# Patient Record
Sex: FEMALE | Race: WHITE | NOT HISPANIC OR LATINO | Employment: OTHER | URBAN - METROPOLITAN AREA
[De-identification: names, ages, dates, MRNs, and addresses within clinical notes are randomized per-mention and may not be internally consistent; named-entity substitution may affect disease eponyms.]

---

## 2017-05-09 ENCOUNTER — ALLIED HEALTH/NURSE VISIT (OUTPATIENT)
Dept: NURSING | Facility: CLINIC | Age: 72
End: 2017-05-09
Payer: MEDICARE

## 2017-05-09 DIAGNOSIS — H92.09 EAR PAIN: Primary | ICD-10-CM

## 2017-05-09 PROCEDURE — 99207 ZZC NO CHARGE NURSE ONLY: CPT

## 2017-07-26 DIAGNOSIS — Z79.890 NEED FOR PROPHYLACTIC HORMONE REPLACEMENT THERAPY (POSTMENOPAUSAL): ICD-10-CM

## 2017-07-26 RX ORDER — ESTRADIOL 0.5 MG/1
0.5 TABLET ORAL DAILY
Qty: 90 TABLET | Refills: 0 | Status: SHIPPED | OUTPATIENT
Start: 2017-07-26 | End: 2017-09-26

## 2017-07-26 NOTE — TELEPHONE ENCOUNTER
estradiol (ESTRACE) 0.5 MG tablet      Last Written Prescription Date: 10/10/2016  Last Fill Quantity: 90,  # refills: 2   Last Office Visit with [facility], [facility] or [facility] prescribing provider: 0727/2016                                         Next 5 appointments (look out 90 days)     Aug 29, 2017  9:00 AM CDT   Office Visit with Monika Andrews DO   [facility] ([facility])    [address]  [facility] [id number] [phone]

## 2017-09-26 ENCOUNTER — OFFICE VISIT (OUTPATIENT)
Dept: OBGYN | Facility: CLINIC | Age: 72
End: 2017-09-26
Payer: MEDICARE

## 2017-09-26 VITALS
TEMPERATURE: 98 F | DIASTOLIC BLOOD PRESSURE: 78 MMHG | WEIGHT: 149.3 LBS | HEIGHT: 66 IN | BODY MASS INDEX: 23.99 KG/M2 | SYSTOLIC BLOOD PRESSURE: 118 MMHG

## 2017-09-26 DIAGNOSIS — Z79.890 NEED FOR PROPHYLACTIC HORMONE REPLACEMENT THERAPY (POSTMENOPAUSAL): ICD-10-CM

## 2017-09-26 DIAGNOSIS — Z00.00 ROUTINE GENERAL MEDICAL EXAMINATION AT A HEALTH CARE FACILITY: Primary | ICD-10-CM

## 2017-09-26 DIAGNOSIS — Z12.31 ENCOUNTER FOR SCREENING MAMMOGRAM FOR MALIGNANT NEOPLASM OF BREAST: ICD-10-CM

## 2017-09-26 PROCEDURE — 99397 PER PM REEVAL EST PAT 65+ YR: CPT | Performed by: FAMILY MEDICINE

## 2017-09-26 RX ORDER — ESTRADIOL 0.5 MG/1
0.5 TABLET ORAL DAILY
Qty: 90 TABLET | Refills: 3 | Status: SHIPPED | OUTPATIENT
Start: 2017-09-26 | End: 2018-11-04

## 2017-09-26 RX ORDER — ESTRADIOL 0.1 MG/G
2 CREAM VAGINAL PRN
Qty: 70 G | Refills: 11 | Status: SHIPPED | OUTPATIENT
Start: 2017-09-26 | End: 2019-05-28

## 2017-09-26 NOTE — NURSING NOTE
"Chief Complaint   Patient presents with     Gyn Exam       Initial /78  Temp 98  F (36.7  C) (Oral)  Ht 5' 5.5\" (1.664 m)  Wt 149 lb 4.8 oz (67.7 kg)  BMI 24.47 kg/m2 Estimated body mass index is 24.47 kg/(m^2) as calculated from the following:    Height as of this encounter: 5' 5.5\" (1.664 m).    Weight as of this encounter: 149 lb 4.8 oz (67.7 kg).  Medication Reconciliation: complete   Arelis Darling CMA      "

## 2017-09-26 NOTE — PROGRESS NOTES
SUBJECTIVE:  Alix Escobar is an 72 year old  postmenopausal woman   who presents for annual gyn exam. No bleeding, spotting, or discharge noted. Concerns:      Patient reports she is doing well. Denies any vaginal bleeding. Patient would like a refill of estrace; she notes she doesn't use it often, but sometimes has discomfort. Alix also states that a massage therapist told her to ask her doctor about a lump that the therapist felt somewhere on her abdomen. Denies any abdominal pain.     Estrogen replacement therapy: continuous daily oral regimen  MARSHA exposure: no  History of abnormal Pap smear: No  Family history of uterine or ovarian cancer: No  Regular self breast exam: Yes  History of abnormal mammogram: Yes, benign breast biopsy   Family history of breast cancer: No  History of abnormal lipids: Yes: hyperlipidemia     Past Medical History:   Diagnosis Date     Advanced directives, counseling/discussion 2011     Cervicalgia 2011     Dysphagia      Hyperlipidemia LDL goal <130 4/15/2011     Hyperlipidemia LDL goal <160 4/15/2011    High HDL     Osteoarthritides      Osteopenia 2011     Routine general medical examination at a health care facility 2011     Routine gynecological examination 2011     Umbilical hernia           Family History   Problem Relation Age of Onset     CEREBROVASCULAR DISEASE Father      parkinsons     C.A.D. Mother      Unknown/Adopted Maternal Grandmother      Hypertension Maternal Grandfather      Hypertension Paternal Grandfather      Blood Disease Sister      Neurologic Disorder Sister      epilepsy     Arthritis Other      neck and thumb joints       Past Surgical History:   Procedure Laterality Date     BIOPSY      benign breast     COLONOSCOPY  10/1/2013    Procedure: COLONOSCOPY;  Colonoscopy ;  Surgeon: Adair Stoddard MD;  Location: [facility] GI     EXCISE MASS UPPER EXTREMITY  2013    Procedure: EXCISE MASS UPPER EXTREMITY;  Remove Mass  "Right Upper Arm;  Surgeon: Elvira Wilson MD;  Location: [facility] OR     EYE SURGERY      lasik       Current Outpatient Prescriptions   Medication     estradiol (ESTRACE) 0.1 MG/GM cream     estradiol (ESTRACE) 0.5 MG tablet     calcium citrate-vitamin D (CITRACAL) 315-200 MG-UNIT TABS     multivitamin, therapeutic with minerals (MULTI-VITAMIN) TABS     PROGESTERONE     [DISCONTINUED] estradiol (ESTRACE) 0.5 MG tablet     doxycycline (VIBRAMYCIN) 100 MG capsule     No current facility-administered medications for this visit.      Allergies   Allergen Reactions     Seasonal Allergies        Social History   Substance Use Topics     Smoking status: Never Smoker     Smokeless tobacco: Never Used     Alcohol use No       Review Of Systems  Ears/Nose/Throat: negative  Respiratory: No shortness of breath, dyspnea on exertion, cough, or hemoptysis  Cardiovascular: negative  Gastrointestinal: negative  Genitourinary: negative    This document serves as a record of the services and decisions personally performed and made by Monika Andrews DO. It was created on his/her behalf by Ashleigh Alston, a trained medical scribe. The creation of this document is based the provider's statements to the medical scribe.  Scribling Alston 1:48 PM, September 26, 2017    OBJECTIVE:  /78  Temp 98  F (36.7  C) (Oral)  Ht 1.664 m (5' 5.5\")  Wt 67.7 kg (149 lb 4.8 oz)  BMI 24.47 kg/m2  General appearance: healthy, alert and no distress  Skin: Skin color, texture, turgor normal. No rashes or lesions.  Ears: negative  Nose/Sinuses: Nares normal. Septum midline. Mucosa normal. No drainage or sinus tenderness.  Oropharynx: Lips, mucosa, and tongue normal. Teeth and gums normal.  Neck: Neck supple. No adenopathy. Thyroid symmetric, normal size,, Carotids without bruits.  Lungs: negative, Percussion normal. Good diaphragmatic excursion. Lungs clear  Heart: negative, PMI normal. No lifts, heaves, or thrills. RRR. No murmurs, clicks " gallops or rub  Breasts: Inspection negative. No nipple discharge or bleeding. No masses.  Abdomen: Abdomen soft, non-tender. BS normal. No masses, organomegaly  Pelvic: Pelvic:  Pelvic examination without pap/ Gonorrhea and Chlamydia   including  External genitalia normal   and vagina normal rugatted not atrophic  Examination of urethra  normal no masses, tenderness, scarring  bladder, no masses or tenderness   Rectovaginal - deferred    ASSESSMENT:  Alix Escobar is an 72 year old  postmenopausal woman   who presents for annual gyn exam.   Concerns:    PLAN:  Dx:  1)  Patient not due for pap smear, mammogram due  2)  Lipids at appropriate intervals; Colonoscopy ordered  3)  Rx refills of estradiol 0.5 mg tab and 0.1 mg/gm cream   4)  Return to clinic yearly or prn     Rx:  Estradiol 0.5 mg tab po  Estradiol 0.1 mg/gm cream 2 g pv as needed    PE:  Reviewed health maintenance including diet, regular exercise,   estrogen replacement and periodic exams.    The information in this document, created by the medical scribe for me, accurately reflects the services I personally performed and the decisions made by me. I have reviewed and approved this document for accuracy prior to leaving the patient care area.  Monika Andrews DO  2:04 PM, 17    Dr. Monika Andrews, DO    Obstetrics and Gynecology  [facility] [place]

## 2017-09-26 NOTE — MR AVS SNAPSHOT
After Visit Summary   9/26/2017    Alix Escobar    MRN: [id number]           Patient Information     Date Of Birth          1945        Visit Information        Provider Department      9/26/2017 1:30 PM Monika Andrews, DO [facility]        Today's Diagnoses     Routine general medical examination at a health care facility    -  1    Encounter for screening mammogram for malignant neoplasm of breast         Need for prophylactic hormone replacement therapy (postmenopausal)          Care Instructions    Return yearly     Call Lab [phone] to schedule future labs. You [facility]   [facility]  If you are getting cholesterol checked please fast 8 hours     Mammogram schedule     Dr. Monika Andrews, DO    Obstetrics and Gynecology  [facility] [place]               Dr. Monika Andrews, DO    Obstetrics and Gynecology  [facility] [place]                 Follow-ups after your visit        Future tests that were ordered for you today     Open Future Orders        Priority Expected Expires Ordered    *MA Screening Digital Bilateral Routine  9/26/2018 9/26/2017    **CBC with platelets FUTURE anytime Routine 9/26/2017 9/26/2018 9/26/2017    **Comprehensive metabolic panel FUTURE anytime Routine 9/26/2017 9/26/2018 9/26/2017    **Lipid panel reflex to direct LDL FUTURE anytime Routine 9/26/2017 9/26/2018 9/26/2017    **TSH with free T4 reflex FUTURE anytime Routine 9/26/2017 9/26/2018 9/26/2017            Who to contact     If you have questions or need follow up information about today's clinic visit or your schedule please contact [facility] directly at [phone].  Normal or non-critical lab and imaging results will be communicated to you by MyChart, letter or phone within 4 business days after the clinic has received the results. If you do not hear from us within 7 days, please  "contact the clinic through [employer] or phone. If you have a critical or abnormal lab result, we will notify you by phone as soon as possible.  Submit refill requests through [employer] or call your pharmacy and they will forward the refill request to us. Please allow 3 business days for your refill to be completed.          Additional Information About Your Visit        [employer] Information     [employer] gives you secure access to your electronic health record. If you see a primary care provider, you can also send messages to your care team and make appointments. If you have questions, please call your primary care clinic.  If you do not have a primary care provider, please call [phone] and they will assist you.        Care EveryWhere ID     This is your Care EveryWhere ID. This could be used by other organizations to access your [place] medical records  [id number]        Your Vitals Were     Temperature Height BMI (Body Mass Index)             98  F (36.7  C) (Oral) 5' 5.5\" (1.664 m) 24.47 kg/m2          Blood Pressure from Last 3 Encounters:   09/26/17 118/78   07/27/16 128/74   07/20/16 110/70    Weight from Last 3 Encounters:   09/26/17 149 lb 4.8 oz (67.7 kg)   07/27/16 146 lb (66.2 kg)   07/20/16 144 lb 1.6 oz (65.4 kg)                 Where to get your medicines      These medications were sent to [id number] - [place], [place] - [address] [place] [address] [address] [facility] [address], [facility] [address]     Phone:  [phone]     estradiol 0.1 MG/GM cream    estradiol 0.5 MG tablet          Primary Care Provider Office Phone # Fax #    Brent Casas [phone] [address] [facility] [address]        Equal Access to Services     RAY CABAN : Mandy Bautista, snow farfan, qaybta kaaljessica echevarria. So [facility] [phone].    ATENCIÓN: Si habla español, tiene a jordan disposición servicios gratuitos de asistencia " lingüística. Rona al [phone].    We comply with applicable federal civil rights laws and [place] laws. We do not discriminate on the basis of race, color, national origin, age, disability sex, sexual orientation or gender identity.            Thank you!     Thank you for choosing [facility]  for your care. Our goal is always to provide you with excellent care. Hearing back from our patients is one way we can continue to improve our services. Please take a few minutes to complete the written survey that you may receive in the mail after your visit with us. Thank you!             Your Updated Medication List - Protect others around you: Learn how to safely use, store and throw away your medicines at www.disposemymeds.org.          This list is accurate as of: 9/26/17  1:58 PM.  Always use your most recent med list.                   Brand Name Dispense Instructions for use Diagnosis    calcium citrate-vitamin D 315-200 MG-UNIT Tabs per tablet    CITRACAL     Take 1 tablet by mouth daily        doxycycline 100 MG capsule    VIBRAMYCIN    20 capsule    Take 1 capsule (100 mg) by mouth 2 times daily    Tick bite of left popliteal region, initial encounter       estradiol 0.1 MG/GM cream    ESTRACE    70 g    Place 2 g vaginally as needed    Need for prophylactic hormone replacement therapy (postmenopausal)       estradiol 0.5 MG tablet    ESTRACE    90 tablet    Take 1 tablet (0.5 mg) by mouth daily    Need for prophylactic hormone replacement therapy (postmenopausal)       Multi-vitamin Tabs tablet      Take 1 tablet by mouth daily        PROGESTERONE      cream    Routine general medical examination at a health care facility

## 2017-09-26 NOTE — PATIENT INSTRUCTIONS
Return yearly     Call Lab [phone] to schedule future labs. You may schedule   The labs at any [place] facitily.  If you are getting cholesterol checked please fast 8 hours     Mammogram schedule     Dr. Monika Andrews, DO    Obstetrics and Gynecology  [facility]               Dr. Monika Andrews, DO    Obstetrics and Gynecology  [facility]

## 2017-10-09 ENCOUNTER — ALLIED HEALTH/NURSE VISIT (OUTPATIENT)
Dept: NURSING | Facility: CLINIC | Age: 72
End: 2017-10-09
Payer: MEDICARE

## 2017-10-09 DIAGNOSIS — Z23 ENCOUNTER FOR IMMUNIZATION: Primary | ICD-10-CM

## 2017-10-09 DIAGNOSIS — Z23 NEED FOR PROPHYLACTIC VACCINATION AND INOCULATION AGAINST INFLUENZA: ICD-10-CM

## 2017-10-09 DIAGNOSIS — Z00.00 ROUTINE GENERAL MEDICAL EXAMINATION AT A HEALTH CARE FACILITY: ICD-10-CM

## 2017-10-09 LAB
ALBUMIN SERPL-MCNC: 3.7 G/DL (ref 3.4–5)
ALP SERPL-CCNC: 72 U/L (ref 40–150)
ALT SERPL W P-5'-P-CCNC: 28 U/L (ref 0–50)
ANION GAP SERPL CALCULATED.3IONS-SCNC: 5 MMOL/L (ref 3–14)
AST SERPL W P-5'-P-CCNC: 23 U/L (ref 0–45)
BILIRUB SERPL-MCNC: 0.7 MG/DL (ref 0.2–1.3)
BUN SERPL-MCNC: 18 MG/DL (ref 7–30)
CALCIUM SERPL-MCNC: 9.6 MG/DL (ref 8.5–10.1)
CHLORIDE SERPL-SCNC: 105 MMOL/L (ref 94–109)
CHOLEST SERPL-MCNC: 211 MG/DL
CO2 SERPL-SCNC: 30 MMOL/L (ref 20–32)
CREAT SERPL-MCNC: 0.82 MG/DL (ref 0.52–1.04)
ERYTHROCYTE [DISTWIDTH] IN BLOOD BY AUTOMATED COUNT: 13.7 % (ref 10–15)
GFR SERPL CREATININE-BSD FRML MDRD: 68 ML/MIN/1.7M2
GLUCOSE SERPL-MCNC: 89 MG/DL (ref 70–99)
HCT VFR BLD AUTO: 42.3 % (ref 35–47)
HDLC SERPL-MCNC: 90 MG/DL
HGB BLD-MCNC: 14.3 G/DL (ref 11.7–15.7)
LDLC SERPL CALC-MCNC: 108 MG/DL
MCH RBC QN AUTO: 29.8 PG (ref 26.5–33)
MCHC RBC AUTO-ENTMCNC: 33.8 G/DL (ref 31.5–36.5)
MCV RBC AUTO: 88 FL (ref 78–100)
NONHDLC SERPL-MCNC: 121 MG/DL
PLATELET # BLD AUTO: 280 10E9/L (ref 150–450)
POTASSIUM SERPL-SCNC: 4 MMOL/L (ref 3.4–5.3)
PROT SERPL-MCNC: 7.3 G/DL (ref 6.8–8.8)
RBC # BLD AUTO: 4.8 10E12/L (ref 3.8–5.2)
SODIUM SERPL-SCNC: 140 MMOL/L (ref 133–144)
TRIGL SERPL-MCNC: 67 MG/DL
TSH SERPL DL<=0.005 MIU/L-ACNC: 2.89 MU/L (ref 0.4–4)
WBC # BLD AUTO: 6.3 10E9/L (ref 4–11)

## 2017-10-09 PROCEDURE — 85027 COMPLETE CBC AUTOMATED: CPT | Performed by: FAMILY MEDICINE

## 2017-10-09 PROCEDURE — 84443 ASSAY THYROID STIM HORMONE: CPT

## 2017-10-09 PROCEDURE — 90670 PCV13 VACCINE IM: CPT

## 2017-10-09 PROCEDURE — 80061 LIPID PANEL: CPT

## 2017-10-09 PROCEDURE — 80053 COMPREHEN METABOLIC PANEL: CPT

## 2017-10-09 PROCEDURE — G0009 ADMIN PNEUMOCOCCAL VACCINE: HCPCS

## 2017-10-09 PROCEDURE — G0008 ADMIN INFLUENZA VIRUS VAC: HCPCS

## 2017-10-09 PROCEDURE — 90662 IIV NO PRSV INCREASED AG IM: CPT

## 2017-10-09 PROCEDURE — 36415 COLL VENOUS BLD VENIPUNCTURE: CPT

## 2017-10-09 NOTE — PROGRESS NOTES
Injectable Influenza Immunization Documentation    1.  Is the person to be vaccinated sick today?   No    2. Does the person to be vaccinated have an allergy to a component   of the vaccine?   No    3. Has the person to be vaccinated ever had a serious reaction   to influenza vaccine in the past?   No    4. Has the person to be vaccinated ever had Guillain-Barré syndrome?   No    Form completed by LENA Redd

## 2017-10-09 NOTE — MR AVS SNAPSHOT
"              After Visit Summary   10/9/2017    Alix Escobar    MRN: [id number]           Patient Information     Date Of Birth          1945        Visit Information        Provider Department      10/9/2017 8:30 AM MORGAN BARBOUR/LPN [facility]        Today's Diagnoses     Encounter for immunization    -  1    Need for prophylactic vaccination and inoculation against influenza           Follow-ups after your visit        Your next 10 appointments already scheduled     Oct 12, 2017  1:00 PM CDT   MA SCREENING DIGITAL BILATERAL with CRMA1   [facility]    [address] [place] [id number] [phone]           Do not use any powder, lotion or deodorant under your arms or on your breast. If you do, we will ask you to remove it before your exam.  Wear comfortable, two-piece clothing.  If you have any allergies, tell your care team.  Bring any previous mammograms from other facilities or have them mailed to the breast center. Three-dimensional (3D) mammograms are available at [place] locations in [facility], [facility], [facility], and [place]. [facility] locations include [place] [facility] in [place]. Benefits of 3D mammograms include: - Improved rate of cancer detection - Decreases your chance of having to go back for more tests, which means fewer: - \"False-positive\" results (This means that there is an abnormal area but it isn't cancer.) - Invasive testing procedures, such as a biopsy or surgery - Can provide clearer images of the breast if you have dense breast tissue. 3D mammography is an optional exam that anyone can have with a 2D mammogram. It doesn't replace or take the place of a 2D mammogram. 2D mammograms remain an effective screening test for all women.  Not all insurance companies cover the cost of a 3D mammogram. Check with your insurance.            "   Who to contact     If you have questions or need follow up information about today's clinic visit or your schedule please contact [facility] directly at [phone].  Normal or non-critical lab and imaging results will be communicated to you by MyChart, letter or phone within 4 business days after the clinic has received the results. If you do not hear from us within 7 days, please contact the clinic through MyChart or phone. If you have a critical or abnormal lab result, we will notify you by phone as soon as possible.  Submit refill requests through [employer] or call your pharmacy and they will forward the refill request to us. Please allow 3 business days for your refill to be completed.          Additional Information About Your Visit        [employer] Information     [employer] gives you secure access to your electronic health record. If you see a primary care provider, you can also send messages to your care team and make appointments. If you have questions, please call your primary care clinic.  If you do not have a primary care provider, please call [phone] and they will assist you.        Care EveryWhere ID     This is your Care EveryWhere ID. This could be used by other organizations to access your [place] medical records  [id number]         Blood Pressure from Last 3 Encounters:   09/26/17 118/78   07/27/16 128/74   07/20/16 110/70    Weight from Last 3 Encounters:   09/26/17 149 lb 4.8 oz (67.7 kg)   07/27/16 146 lb (66.2 kg)   07/20/16 144 lb 1.6 oz (65.4 kg)              We Performed the Following     ADMIN INFLUENZA (For MEDICARE Patients ONLY) []     ADMIN MEDICARE: Pneumococcal Vaccine ()     FLU VACCINE, INCREASED ANTIGEN, PRESV FREE, AGE 65+ [43485]     PNEUMOCOCCAL CONJ VACCINE 13 VALENT IM        Primary Care Provider Office Phone # Fax #    Brent Casas [phone] [address] [facility] [address]        Equal Access to Services      RAY CABAN : Hadii aad ku luis miguel Bautista, wafroilanda luqadaha, qaybta kaalmada keriaurabrooklynn, jessica siddiquikarensarika olguin. So [facility] [phone].    ATENCIÓN: Si habla español, tiene a jordan disposición servicios gratuitos de asistencia lingüística. Llame al [phone].    We comply with applicable federal civil rights laws and [place] laws. We do not discriminate on the basis of race, color, national origin, age, disability, sex, sexual orientation, or gender identity.            Thank you!     Thank you for choosing [facility]  for your care. Our goal is always to provide you with excellent care. Hearing back from our patients is one way we can continue to improve our services. Please take a few minutes to complete the written survey that you may receive in the mail after your visit with us. Thank you!             Your Updated Medication List - Protect others around you: Learn how to safely use, store and throw away your medicines at www.disposemymeds.org.          This list is accurate as of: 10/9/17  8:51 AM.  Always use your most recent med list.                   Brand Name Dispense Instructions for use Diagnosis    calcium citrate-vitamin D 315-200 MG-UNIT Tabs per tablet    CITRACAL     Take 1 tablet by mouth daily        estradiol 0.1 MG/GM cream    ESTRACE    70 g    Place 2 g vaginally as needed    Need for prophylactic hormone replacement therapy (postmenopausal)       estradiol 0.5 MG tablet    ESTRACE    90 tablet    Take 1 tablet (0.5 mg) by mouth daily    Need for prophylactic hormone replacement therapy (postmenopausal)       Multi-vitamin Tabs tablet      Take 1 tablet by mouth daily        PROGESTERONE      cream    Routine general medical examination at a health care facility

## 2017-10-25 ENCOUNTER — RADIANT APPOINTMENT (OUTPATIENT)
Dept: MAMMOGRAPHY | Facility: CLINIC | Age: 72
End: 2017-10-25
Payer: MEDICARE

## 2017-10-25 DIAGNOSIS — Z12.31 ENCOUNTER FOR SCREENING MAMMOGRAM FOR MALIGNANT NEOPLASM OF BREAST: ICD-10-CM

## 2017-10-25 PROCEDURE — G0202 SCR MAMMO BI INCL CAD: HCPCS | Mod: TC

## 2017-11-06 ENCOUNTER — HOSPITAL ENCOUNTER (OUTPATIENT)
Dept: ULTRASOUND IMAGING | Facility: CLINIC | Age: 72
End: 2017-11-06
Attending: FAMILY MEDICINE
Payer: MEDICARE

## 2017-11-06 ENCOUNTER — HOSPITAL ENCOUNTER (OUTPATIENT)
Dept: MAMMOGRAPHY | Facility: CLINIC | Age: 72
Discharge: HOME | End: 2017-11-06
Attending: FAMILY MEDICINE | Admitting: FAMILY MEDICINE
Payer: MEDICARE

## 2017-11-06 DIAGNOSIS — R92.8 ABNORMAL MAMMOGRAM: ICD-10-CM

## 2017-11-06 PROCEDURE — G0279 TOMOSYNTHESIS, MAMMO: HCPCS

## 2017-11-06 PROCEDURE — 76642 ULTRASOUND BREAST LIMITED: CPT | Mod: RT

## 2017-12-13 ENCOUNTER — OFFICE VISIT (OUTPATIENT)
Dept: FAMILY MEDICINE | Facility: CLINIC | Age: 72
End: 2017-12-13
Payer: MEDICARE

## 2017-12-13 VITALS
SYSTOLIC BLOOD PRESSURE: 128 MMHG | WEIGHT: 147 LBS | DIASTOLIC BLOOD PRESSURE: 82 MMHG | RESPIRATION RATE: 16 BRPM | OXYGEN SATURATION: 100 % | BODY MASS INDEX: 24.09 KG/M2 | HEART RATE: 90 BPM | TEMPERATURE: 97.9 F

## 2017-12-13 DIAGNOSIS — J20.9 ACUTE BRONCHITIS WITH SYMPTOMS > 10 DAYS: Primary | ICD-10-CM

## 2017-12-13 PROCEDURE — 99213 OFFICE O/P EST LOW 20 MIN: CPT | Performed by: FAMILY MEDICINE

## 2017-12-13 RX ORDER — AZITHROMYCIN 250 MG/1
TABLET, FILM COATED ORAL
Qty: 6 TABLET | Refills: 0 | Status: SHIPPED | OUTPATIENT
Start: 2017-12-13 | End: 2018-02-07

## 2017-12-13 NOTE — PROGRESS NOTES
"  SUBJECTIVE:   Alix Escobar is a 72 year old female who presents to clinic today for the following health issues:    She started with cold but it just won't go away.    She has tried OTC cough drops and benadryl and tylenol and ibuprofen.   The worst sx is the cough and congestion.   She is having mucous in the chest.   She is having some shortness of breath.   Her  had same thing a month ago and it finally went away on its own.        Acute Illness   Acute illness concerns: Cough  Onset: X 3 weeks     Fever: no     Chills/Sweats: no     Headache (location?): YES    Sinus Pressure:YES    Conjunctivitis:  no    Ear Pain: no    Rhinorrhea: no     Congestion: YES    Sore Throat: YES-but intermittent     Cough: YES-productive of unknown color of sputum    Wheeze: YES    Decreased Appetite: no     Nausea: no     Vomiting: no     Diarrhea:  \"Little bit\"    Dysuria/Freq.: no     Fatigue/Achiness: YES    Sick/Strep Exposure: YES-  had same symptoms recently     Therapies Tried and outcome: OTC cough drops, Aleve, Tylenol, benadryl, temporarily relieved symptoms         Past Medical History:   Diagnosis Date     Advanced directives, counseling/discussion 6/29/2011     Cervicalgia 8/8/2011     Dysphagia      Hyperlipidemia LDL goal <130 4/15/2011     Hyperlipidemia LDL goal <160 4/15/2011    High HDL     Osteoarthritides      Osteopenia 9/20/2011     Routine general medical examination at a health care facility 9/20/2011     Routine gynecological examination 9/20/2011     Umbilical hernia        Past Surgical History:   Procedure Laterality Date     BIOPSY      benign breast     COLONOSCOPY  10/1/2013    Procedure: COLONOSCOPY;  Colonoscopy ;  Surgeon: Adair Stoddard MD;  Location:  GI     EXCISE MASS UPPER EXTREMITY  5/20/2013    Procedure: EXCISE MASS UPPER EXTREMITY;  Remove Mass Right Upper Arm;  Surgeon: Elvira Wilson MD;  Location:  OR     EYE SURGERY      lasik "       MEDICATIONS:  Current Outpatient Prescriptions   Medication     estradiol (ESTRACE) 0.1 MG/GM cream     estradiol (ESTRACE) 0.5 MG tablet     calcium citrate-vitamin D (CITRACAL) 315-200 MG-UNIT TABS     multivitamin, therapeutic with minerals (MULTI-VITAMIN) TABS     PROGESTERONE     No current facility-administered medications for this visit.        SOCIAL HISTORY:  Social History   Substance Use Topics     Smoking status: Never Smoker     Smokeless tobacco: Never Used     Alcohol use No       Family History   Problem Relation Age of Onset     CEREBROVASCULAR DISEASE Father      parkinsons     C.A.D. Mother      Unknown/Adopted Maternal Grandmother      Hypertension Maternal Grandfather      Hypertension Paternal Grandfather      Blood Disease Sister      Neurologic Disorder Sister      epilepsy     Arthritis Other      neck and thumb joints       Objective:  Blood pressure 128/82, pulse 90, temperature 97.9  F (36.6  C), temperature source Oral, resp. rate 16, weight 147 lb (66.7 kg), SpO2 100 %.  HEENT:  TM's are clear bilaterally.  Oropharynx is clear without tonsillar hypertrophy or exudate.  Conjuctiva are clear.  Neck:  There is no lymphadenopathy or thyroid tenderness or enlargement  Chest: Clear to auscultation bilaterally.  No wheezes, rales or retractions.  CV: Regular rate and rhythm without murmurs, rubs or gallops.  ABDOMEN:  Positive bowel sounds, soft, nondistended and nontender.  No masses are palpated and there is no organomegaly or inguinal lymphadenopathy.    Assessment:  1. Bronchitis with sx for 3 weeks      Plan:  1. azithromcyin  2. The potential side effects of the prescription medication were discussed with the patient.  3. The patient is to follow up as needed for nonresolution of symptoms  4. Due for tdap in spring

## 2017-12-13 NOTE — NURSING NOTE
"Chief Complaint   Patient presents with     Cough     Cough X 3 weeks, cough worsened 2 days ago, productive       Initial /82 (BP Location: Right arm, Patient Position: Chair, Cuff Size: Adult Regular)  Pulse 90  Temp 97.9  F (36.6  C) (Oral)  Resp 16  Wt 147 lb (66.7 kg)  SpO2 100%  BMI 24.09 kg/m2 Estimated body mass index is 24.09 kg/(m^2) as calculated from the following:    Height as of 9/26/17: 5' 5.5\" (1.664 m).    Weight as of this encounter: 147 lb (66.7 kg).  Medication Reconciliation: complete   Alejandro Oseguera MA      "

## 2017-12-13 NOTE — MR AVS SNAPSHOT
After Visit Summary   12/13/2017    Alix Escobar    MRN: [id number]           Patient Information     Date Of Birth          1945        Visit Information        Provider Department      12/13/2017 2:45 PM Natasha Mcintosh MD [facility]        Today's Diagnoses     Acute bronchitis with symptoms > 10 days    -  1       Follow-ups after your visit        Who to contact     If you have questions or need follow up information about today's clinic visit or your schedule please contact [facility] directly at [phone].  Normal or non-critical lab and imaging results will be communicated to you by Bloxyhart, letter or phone within 4 business days after the clinic has received the results. If you do not hear from us within 7 days, please contact the clinic through GeoPayt or phone. If you have a critical or abnormal lab result, we will notify you by phone as soon as possible.  Submit refill requests through [employer] or call your pharmacy and they will forward the refill request to us. Please allow 3 business days for your refill to be completed.          Additional Information About Your Visit        MyChart Information     [employer] gives you secure access to your electronic health record. If you see a primary care provider, you can also send messages to your care team and make appointments. If you have questions, please call your primary care clinic.  If you do not have a primary care provider, please call [phone] and they will assist you.        Care EveryWhere ID     This is your Care EveryWhere ID. This could be used by other organizations to access your [facility] medical records  [id number]        Your Vitals Were     Pulse Temperature Respirations Pulse Oximetry BMI (Body Mass Index)       90 97.9  F (36.6  C) (Oral) 16 100% 24.09 kg/m2        Blood Pressure from Last 3 Encounters:   12/13/17 128/82   09/26/17 118/78   07/27/16 128/74    Weight from  Last 3 Encounters:   12/13/17 147 lb (66.7 kg)   09/26/17 149 lb 4.8 oz (67.7 kg)   07/27/16 146 lb (66.2 kg)              Today, you had the following     No orders found for display         Today's Medication Changes          These changes are accurate as of: 12/13/17  3:25 PM.  If you have any questions, ask your nurse or doctor.               Start taking these medicines.        Dose/Directions    azithromycin 250 MG tablet   Commonly known as:  ZITHROMAX   Used for:  Acute bronchitis with symptoms > 10 days   Started by:  Natasha Mcintosh MD        Two tablets first day, then one tablet daily for four days.   Quantity:  6 tablet   Refills:  0            Where to get your medicines      These medications were sent to [facility] [place] [id number]  [id number] [place] [address]     Phone:  [phone]     azithromycin 250 MG tablet                Primary Care Provider Office Phone # Fax #    Brent Casas -[phone] [address] [facility] [address]        Equal Access to Services     [facility]: Hadii irlanda ku hadasho Soomaali, waaxda luqadaha, qaybta kaalmada adeegyabrooklynn, jessica sauer . So [facility] [phone].    ATENCIÓN: Si habla español, tiene a jordan disposición servicios gratuitos de asistencia lingüística. [facility] [phone].    We comply with applicable federal civil rights laws and [place] laws. We do not discriminate on the basis of race, color, national origin, age, disability, sex, sexual orientation, or gender identity.            Thank you!     Thank you for choosing [facility]  for your care. Our goal is always to provide you with excellent care. Hearing back from our patients is one way we can continue to improve our services. Please take a few minutes to complete the written survey that you may receive in the mail after your visit with us. Thank you!             Your Updated  Medication List - Protect others around you: Learn how to safely use, store and throw away your medicines at www.disposemymeds.org.          This list is accurate as of: 12/13/17  3:25 PM.  Always use your most recent med list.                   Brand Name Dispense Instructions for use Diagnosis    azithromycin 250 MG tablet    ZITHROMAX    6 tablet    Two tablets first day, then one tablet daily for four days.    Acute bronchitis with symptoms > 10 days       calcium citrate-vitamin D 315-200 MG-UNIT Tabs per tablet    CITRACAL     Take 1 tablet by mouth daily        estradiol 0.1 MG/GM cream    ESTRACE    70 g    Place 2 g vaginally as needed    Need for prophylactic hormone replacement therapy (postmenopausal)       estradiol 0.5 MG tablet    ESTRACE    90 tablet    Take 1 tablet (0.5 mg) by mouth daily    Need for prophylactic hormone replacement therapy (postmenopausal)       Multi-vitamin Tabs tablet      Take 1 tablet by mouth daily        PROGESTERONE      cream    Routine general medical examination at a health care facility

## 2017-12-18 ENCOUNTER — TELEPHONE (OUTPATIENT)
Dept: FAMILY MEDICINE | Facility: CLINIC | Age: 72
End: 2017-12-18

## 2018-02-07 ENCOUNTER — OFFICE VISIT (OUTPATIENT)
Dept: FAMILY MEDICINE | Facility: CLINIC | Age: 73
End: 2018-02-07
Payer: MEDICARE

## 2018-02-07 VITALS
OXYGEN SATURATION: 98 % | BODY MASS INDEX: 24.94 KG/M2 | RESPIRATION RATE: 12 BRPM | WEIGHT: 152.2 LBS | SYSTOLIC BLOOD PRESSURE: 122 MMHG | HEART RATE: 69 BPM | DIASTOLIC BLOOD PRESSURE: 88 MMHG | TEMPERATURE: 98 F

## 2018-02-07 DIAGNOSIS — H61.20 IMPACTED CERUMEN, UNSPECIFIED LATERALITY: Primary | ICD-10-CM

## 2018-02-07 PROCEDURE — 99213 OFFICE O/P EST LOW 20 MIN: CPT | Performed by: FAMILY MEDICINE

## 2018-02-07 NOTE — MR AVS SNAPSHOT
After Visit Summary   2/7/2018    Alix Escobar    MRN: [id number]           Patient Information     Date Of Birth          1945        Visit Information        Provider Department      2/7/2018 3:00 PM Beltran Chen MD [facility]        Today's Diagnoses     Impacted cerumen, unspecified laterality    -  1       Follow-ups after your visit        Who to contact     If you have questions or need follow up information about today's clinic visit or your schedule please contact [facility] directly at [phone].  Normal or non-critical lab and imaging results will be communicated to you by Oligomerixhart, letter or phone within 4 business days after the clinic has received the results. If you do not hear from us within 7 days, please contact the clinic through [employer] or phone. If you have a critical or abnormal lab result, we will notify you by phone as soon as possible.  Submit refill requests through [employer] or call your pharmacy and they will forward the refill request to us. Please allow 3 business days for your refill to be completed.          Additional Information About Your Visit        MyChart Information     [employer] gives you secure access to your electronic health record. If you see a primary care provider, you can also send messages to your care team and make appointments. If you have questions, please call your primary care clinic.  If you do not have a primary care provider, please call [phone] and they will assist you.        Care EveryWhere ID     This is your Care EveryWhere ID. This could be used by other organizations to access your [facility] medical records  [id number]        Your Vitals Were     Pulse Temperature Respirations Pulse Oximetry BMI (Body Mass Index)       69 98  F (36.7  C) (Oral) 12 98% 24.94 kg/m2        Blood Pressure from Last 3 Encounters:   02/07/18 122/88   12/13/17 128/82   09/26/17 118/78    Weight from  Last 3 Encounters:   02/07/18 152 lb 3.2 oz (69 kg)   12/13/17 147 lb (66.7 kg)   09/26/17 149 lb 4.8 oz (67.7 kg)              Today, you had the following     No orders found for display       Primary Care Provider Office Phone # Fax #    Brent Casas [phone] [address] [facility] [address]        Equal Access to Services     [facility] : Hadii aad ku hadasho Soomaali, waaxda luqadaha, qaybta kaalmada adeegyada, waxay idiin hayaan adeeg arturkarensarika laviolet . So [facility] [phone].    ATENCIÓN: Si habla español, tiene a jordan disposición servicios gratuitos de asistencia lingüística. [facility] [phone].    We comply with applicable federal civil rights laws and [place] laws. We do not discriminate on the basis of race, color, national origin, age, disability, sex, sexual orientation, or gender identity.            Thank you!     Thank you for choosing [facility]  for your care. Our goal is always to provide you with excellent care. Hearing back from our patients is one way we can continue to improve our services. Please take a few minutes to complete the written survey that you may receive in the mail after your visit with us. Thank you!             Your Updated Medication List - Protect others around you: Learn how to safely use, store and throw away your medicines at www.disposemymeds.org.          This list is accurate as of 2/7/18 11:59 PM.  Always use your most recent med list.                   Brand Name Dispense Instructions for use Diagnosis    calcium citrate-vitamin D 315-200 MG-UNIT Tabs per tablet    CITRACAL     Take 1 tablet by mouth daily        estradiol 0.1 MG/GM cream    ESTRACE    70 g    Place 2 g vaginally as needed    Need for prophylactic hormone replacement therapy (postmenopausal)       estradiol 0.5 MG tablet    ESTRACE    90 tablet    Take 1 tablet (0.5 mg) by mouth daily    Need for prophylactic hormone replacement therapy  (postmenopausal)       Multi-vitamin Tabs tablet      Take 1 tablet by mouth daily        PROGESTERONE      cream    Routine general medical examination at a health care facility

## 2018-02-07 NOTE — PROGRESS NOTES
SUBJECTIVE:   Alix Escobar is a 72 year old female who presents to clinic today for the following health issues:      Patient is here for an ear wash only.  Pain is mild and hearing is  muffled.  Review of systems shows no problems with vision,hearing or learning  No heart murmer, asthma, bowel or bladder problems, rashes, back or muscle problems,dental problems, headaches,balance or frequent infections  URI sympoms are none  Ear exam shows impacted wax with bilateral  redness of canal and normal  drum.      PROBLEM LIST:                   Patient Active Problem List   Diagnosis     Hyperlipidemia LDL goal <160     Advance Care Planning     Cervicalgia     Routine general medical examination at a health care facility     Encounter for routine gynecological examination     Osteopenia     Need for prophylactic hormone replacement therapy (postmenopausal)       PAST MEDICAL HISTORY:                  Past Medical History:   Diagnosis Date     Advanced directives, counseling/discussion 6/29/2011     Cervicalgia 8/8/2011     Dysphagia      Hyperlipidemia LDL goal <130 4/15/2011     Hyperlipidemia LDL goal <160 4/15/2011    High HDL     Osteoarthritides      Osteopenia 9/20/2011     Routine general medical examination at a health care facility 9/20/2011     Routine gynecological examination 9/20/2011     Umbilical hernia        PAST SURGICAL HISTORY:                  Past Surgical History:   Procedure Laterality Date     BIOPSY      benign breast     COLONOSCOPY  10/1/2013    Procedure: COLONOSCOPY;  Colonoscopy ;  Surgeon: Adair Stoddard MD;  Location: [facility]     EXCISE MASS UPPER EXTREMITY  5/20/2013    Procedure: EXCISE MASS UPPER EXTREMITY;  Remove Mass Right Upper Arm;  Surgeon: Elvira Wilson MD;  Location: [facility] OR     EYE SURGERY      lasik       CURRENT MEDICATIONS:                  Current Outpatient Prescriptions   Medication Sig Dispense Refill     estradiol (ESTRACE) 0.1 MG/GM cream Place 2 g  vaginally as needed 70 g 11     estradiol (ESTRACE) 0.5 MG tablet Take 1 tablet (0.5 mg) by mouth daily 90 tablet 3     calcium citrate-vitamin D (CITRACAL) 315-200 MG-UNIT TABS Take 1 tablet by mouth daily       multivitamin, therapeutic with minerals (MULTI-VITAMIN) TABS Take 1 tablet by mouth daily       PROGESTERONE cream               FAMILY HISTORY:                   Wax irrigated clear, prn follow up     (H61.20) Impacted cerumen, unspecified laterality  (primary encounter diagnosis)  Comment:   Plan:

## 2018-04-25 ENCOUNTER — OFFICE VISIT (OUTPATIENT)
Dept: FAMILY MEDICINE | Facility: CLINIC | Age: 73
End: 2018-04-25
Payer: MEDICARE

## 2018-04-25 VITALS
HEIGHT: 66 IN | HEART RATE: 72 BPM | BODY MASS INDEX: 24.11 KG/M2 | TEMPERATURE: 97.8 F | WEIGHT: 150 LBS | SYSTOLIC BLOOD PRESSURE: 120 MMHG | DIASTOLIC BLOOD PRESSURE: 72 MMHG | RESPIRATION RATE: 14 BRPM

## 2018-04-25 DIAGNOSIS — B35.1 ONYCHOMYCOSIS: ICD-10-CM

## 2018-04-25 DIAGNOSIS — K21.9 GASTROESOPHAGEAL REFLUX DISEASE, ESOPHAGITIS PRESENCE NOT SPECIFIED: Primary | ICD-10-CM

## 2018-04-25 PROCEDURE — 99214 OFFICE O/P EST MOD 30 MIN: CPT | Performed by: FAMILY MEDICINE

## 2018-04-25 NOTE — PROGRESS NOTES
SUBJECTIVE:   Alix Escobar is a 72 year old female who presents to clinic today for the following health issues:      Gastrointestinal symptoms      Duration: few weeks    Description:           REFLUX SYMPTOMS - cough and feels as if something is in throat, when clearing throat feels better      Intensity:  mild    Accompanying signs and symptoms:  none    History  Previous {similar problem: YES- 2011  Previous evaluation: Upper GI Endoscopy    Aggravating factors: caffeine and spicy foods    Alleviating factors: avoidance of caffeine    Other Therapies tried: None    Ingrown Toenail      Duration: several weeks    Description (location/character/radiation): Bilateral great toes toe nails are not growing anymore    Intensity:  mild    Accompanying signs and symptoms: swelling,  Unsure what is going on, unsure why the big toe nails are not growing    History (similar episodes/previous evaluation): None    Precipitating or alleviating factors: None    Therapies tried and outcome: None         Problem list and histories reviewed & adjusted, as indicated.  Additional history: as documented    Patient Active Problem List   Diagnosis     Hyperlipidemia LDL goal <160     Advance Care Planning     Cervicalgia     Routine general medical examination at a health care facility     Encounter for routine gynecological examination     Osteopenia     Need for prophylactic hormone replacement therapy (postmenopausal)     Gastroesophageal reflux disease, esophagitis presence not specified     Onychomycosis     Past Surgical History:   Procedure Laterality Date     BIOPSY      benign breast     COLONOSCOPY  10/1/2013    Procedure: COLONOSCOPY;  Colonoscopy ;  Surgeon: Adair Stoddard MD;  Location: [facility]     EXCISE MASS UPPER EXTREMITY  5/20/2013    Procedure: EXCISE MASS UPPER EXTREMITY;  Remove Mass Right Upper Arm;  Surgeon: Elvira Wilson MD;  Location: [facility]     EYE SURGERY      lasik       Social History  "  Substance Use Topics     Smoking status: Never Smoker     Smokeless tobacco: Never Used     Alcohol use No     Family History   Problem Relation Age of Onset     CEREBROVASCULAR DISEASE Father      parkinsons     C.A.D. Mother      Unknown/Adopted Maternal Grandmother      Hypertension Maternal Grandfather      Hypertension Paternal Grandfather      Blood Disease Sister      Neurologic Disorder Sister      epilepsy     Arthritis Other      neck and thumb joints           Reviewed and updated as needed this visit by clinical staff  Tobacco  Allergies  Meds  Med Hx  Surg Hx  Fam Hx  Soc Hx      Reviewed and updated as needed this visit by Provider         ROS:  Constitutional, HEENT, cardiovascular, pulmonary, gi and gu systems are negative, except as otherwise noted.    OBJECTIVE:     /72 (BP Location: Right arm, Patient Position: Chair, Cuff Size: Adult Regular)  Pulse 72  Temp 97.8  F (36.6  C) (Oral)  Resp 14  Ht 5' 5.5\" (1.664 m)  Wt 150 lb (68 kg)  BMI 24.58 kg/m2  Body mass index is 24.58 kg/(m^2).  GENERAL: healthy, alert and no distress  ABDOMEN: soft, nontender, no hepatosplenomegaly, no masses and bowel sounds normal  SKIN: Great toenails are not ingrown.  No erythema or swelling.  Big toes do look a bit yellow and thickened    ASSESSMENT/PLAN:     1. Gastroesophageal reflux disease, esophagitis presence not specified  - Re-start PPI  - omeprazole (PRILOSEC) 20 MG CR capsule; Take 1 capsule (20 mg) by mouth daily  Dispense: 90 capsule; Refill: 1    2. Onychomycosis  - Advised OTC topical antifungal treatment     Follow up if symptoms are worsening or not improving    Viky Gray, DO  [facility]"

## 2018-04-25 NOTE — MR AVS SNAPSHOT
After Visit Summary   4/25/2018    Alix Escobar    MRN: [id number]           Patient Information     Date Of Birth          1945        Visit Information        Provider Department      4/25/2018 10:00 AM Viky Gray DO [facility]        Today's Diagnoses     Gastroesophageal reflux disease, esophagitis presence not specified    -  1    Onychomycosis          Care Instructions      Tips to Control Acid Reflux    To control acid reflux, you ll need to make some basic diet and lifestyle changes. The simple steps outlined below may be all you ll need to ease discomfort.  Watch what you eat    Avoid fatty foods and spicy foods.    Eat fewer acidic foods, such as citrus and tomato-based foods. These can increase symptoms.    Limit drinking alcohol, caffeine, and fizzy beverages. All increase acid reflux.    Try limiting chocolate, peppermint, and spearmint. These can worsen acid reflux in some people.  Watch when you eat    Avoid lying down for 3 hours after eating.    Do not snack before going to bed.  Raise your head  Raising your head and upper body by 4 to 6 inches helps limit reflux when you re lying down. Put blocks under the head of your bed frame to raise it.  Other changes    Lose weight, if you need to    Don t exercise near bedtime    Avoid tight-fitting clothes    Limit aspirin and ibuprofen    Stop smoking   Date Last Reviewed: 7/1/2016 2000-2017 [employer]. [address] [place] [address], [place] [address]. All rights reserved. This information is not intended as a substitute for professional medical care. Always follow your healthcare professional's instructions.        Nail Fungal Infection  A nail fungal infection changes the way fingernails and toenails look. They may thicken, discolor, change shape, or split. This condition is hard to treat because nails grow slowly and have limited blood supply. The infection often comes back after  treatment.  There are 2 types of medicines used to treat this condition:    Topical anti-fungal medicines (Lamisil). These are applied to the surface of the skin and nail area. These medicines are not very effective because they can t get deep into the nail.    Oral antifungal medicines. These medicines work better because they go into the nail from the inside out. But the infection may still come back. It may take 9 to 12 months for your nail to look normal again. This means you are cured. You can repeat treatment if needed. Most people take these medicines without any problems. It is rare to stop therapy because of side effects. But your healthcare provider may give you some monitoring tests. Talk about possible side effects with your provider before starting treatment.  If medicines fail, the nail can be removed surgically or chemically. These methods physically remove the fungus from the body. This helps medical treatment be more effective.  Home care    Use medicines exactly as directed for as long as directed. Treating a fungal infection can take longer than other kinds of infections.    Smoking is a risk factor for fungal infection. This is one more reason to quit.    Wear absorbent socks, and shoes that let your feet breathe. Sweaty feet increase your risk of fungal infection. They also make an existing infection harder to treat.    Use footwear when in damp public places like swimming pools, gyms, and shower rooms. This will help you avoid the fungus that grows there.    Don't share nail clippers or scissors with others.  Follow-up care  Follow up with your healthcare provider, or as advised.  When to seek medical advice  Call your healthcare provider right away if any of these occur:    Skin by the nail becomes red, swollen, painful, or drains pus (a creamy yellow or white liquid)    Side effects from oral anti-fungal medicines  Date Last Reviewed: 8/1/2016 2000-2017 [employer] [address]  "[place] [address]. All rights reserved. This information is not intended as a substitute for professional medical care. Always follow your healthcare professional's instructions.                Follow-ups after your visit        Who to contact     If you have questions or need follow up information about today's clinic visit or your schedule please contact [facility] directly at [phone].  Normal or non-critical lab and imaging results will be communicated to you by [employer], letter or phone within 4 business days after the clinic has received the results. If you do not hear from us within 7 days, please contact the clinic through [employer] or phone. If you have a critical or abnormal lab result, we will notify you by phone as soon as possible.  Submit refill requests through [employer] or call your pharmacy and they will forward the refill request to us. Please allow 3 business days for your refill to be completed.          Additional Information About Your Visit        [employer] Information     [employer] gives you secure access to your electronic health record. If you see a primary care provider, you can also send messages to your care team and make appointments. If you have questions, please call your primary care clinic.  If you do not have a primary care provider, please call [phone] and they will assist you.        Care EveryWhere ID     This is your Care EveryWhere ID. This could be used by other organizations to access your [facility] medical records  [id number]        Your Vitals Were     Pulse Temperature Respirations Height BMI (Body Mass Index)       72 97.8  F (36.6  C) (Oral) 14 5' 5.5\" (1.664 m) 24.58 kg/m2        Blood Pressure from Last 3 Encounters:   04/25/18 120/72   02/07/18 122/88   12/13/17 128/82    Weight from Last 3 Encounters:   04/25/18 150 lb (68 kg)   02/07/18 152 lb 3.2 oz (69 kg)   12/13/17 147 lb (66.7 kg)              Today, you had the " following     No orders found for display         Today's Medication Changes          These changes are accurate as of 4/25/18 10:22 AM.  If you have any questions, ask your nurse or doctor.               Start taking these medicines.        Dose/Directions    omeprazole 20 MG CR capsule   Commonly known as:  priLOSEC   Used for:  Gastroesophageal reflux disease, esophagitis presence not specified   Started by:  Viky Gray,         Dose:  20 mg   Take 1 capsule (20 mg) by mouth daily   Quantity:  90 capsule   Refills:  1            Where to get your medicines      These medications were sent to [facility] [address] [place] [address] - [address] AT [place] [address] [address]  [address], [facility] [place] [address]     Phone:  [phone]     omeprazole 20 MG CR capsule                Primary Care Provider Office Phone # Fax #    Brent Casas [phone] [address] [facility] [address]        Equal Access to Services     ARABELLA CABAN AH: Hadii irlanda ku hadasho Soomaali, waaxda luqadaha, qaybta kaalmada adeegyada, waxay idiin hayaan citlaly sauer . So [facility] [phone].    ATENCIÓN: Si habla español, tiene a jordan disposición servicios gratuitos de asistencia lingüística. Rona al [phone].    We comply with applicable federal civil rights laws and [place] laws. We do not discriminate on the basis of race, color, national origin, age, disability, sex, sexual orientation, or gender identity.            Thank you!     Thank you for choosing [facility]  for your care. Our goal is always to provide you with excellent care. Hearing back from our patients is one way we can continue to improve our services. Please take a few minutes to complete the written survey that you may receive in the mail after your visit with us. Thank you!             Your Updated Medication List - Protect others around you: Learn how to safely use, store  and throw away your medicines at www.disposemymeds.org.          This list is accurate as of 4/25/18 10:22 AM.  Always use your most recent med list.                   Brand Name Dispense Instructions for use Diagnosis    calcium citrate-vitamin D 315-200 MG-UNIT Tabs per tablet    CITRACAL     Take 1 tablet by mouth daily        estradiol 0.1 MG/GM cream    ESTRACE    70 g    Place 2 g vaginally as needed    Need for prophylactic hormone replacement therapy (postmenopausal)       estradiol 0.5 MG tablet    ESTRACE    90 tablet    Take 1 tablet (0.5 mg) by mouth daily    Need for prophylactic hormone replacement therapy (postmenopausal)       Multi-vitamin Tabs tablet      Take 1 tablet by mouth daily        omeprazole 20 MG CR capsule    priLOSEC    90 capsule    Take 1 capsule (20 mg) by mouth daily    Gastroesophageal reflux disease, esophagitis presence not specified       PROGESTERONE      cream    Routine general medical examination at a health care facility

## 2018-04-25 NOTE — PATIENT INSTRUCTIONS
Tips to Control Acid Reflux    To control acid reflux, you ll need to make some basic diet and lifestyle changes. The simple steps outlined below may be all you ll need to ease discomfort.  Watch what you eat    Avoid fatty foods and spicy foods.    Eat fewer acidic foods, such as citrus and tomato-based foods. These can increase symptoms.    Limit drinking alcohol, caffeine, and fizzy beverages. All increase acid reflux.    Try limiting chocolate, peppermint, and spearmint. These can worsen acid reflux in some people.  Watch when you eat    Avoid lying down for 3 hours after eating.    Do not snack before going to bed.  Raise your head  Raising your head and upper body by 4 to 6 inches helps limit reflux when you re lying down. Put blocks under the head of your bed frame to raise it.  Other changes    Lose weight, if you need to    Don t exercise near bedtime    Avoid tight-fitting clothes    Limit aspirin and ibuprofen    Stop smoking   Date Last Reviewed: 7/1/2016 2000-2017 [employer]. [address] [place] [address], [place] [address]. All rights reserved. This information is not intended as a substitute for professional medical care. Always follow your healthcare professional's instructions.        Nail Fungal Infection  A nail fungal infection changes the way fingernails and toenails look. They may thicken, discolor, change shape, or split. This condition is hard to treat because nails grow slowly and have limited blood supply. The infection often comes back after treatment.  There are 2 types of medicines used to treat this condition:    Topical anti-fungal medicines (Lamisil). These are applied to the surface of the skin and nail area. These medicines are not very effective because they can t get deep into the nail.    Oral antifungal medicines. These medicines work better because they go into the nail from the inside out. But the infection may still come back. It may take 9 to 12 months for your  nail to look normal again. This means you are cured. You can repeat treatment if needed. Most people take these medicines without any problems. It is rare to stop therapy because of side effects. But your healthcare provider may give you some monitoring tests. Talk about possible side effects with your provider before starting treatment.  If medicines fail, the nail can be removed surgically or chemically. These methods physically remove the fungus from the body. This helps medical treatment be more effective.  Home care    Use medicines exactly as directed for as long as directed. Treating a fungal infection can take longer than other kinds of infections.    Smoking is a risk factor for fungal infection. This is one more reason to quit.    Wear absorbent socks, and shoes that let your feet breathe. Sweaty feet increase your risk of fungal infection. They also make an existing infection harder to treat.    Use footwear when in damp public places like swimming pools, gyms, and shower rooms. This will help you avoid the fungus that grows there.    Don't share nail clippers or scissors with others.  Follow-up care  Follow up with your healthcare provider, or as advised.  When to seek medical advice  Call your healthcare provider right away if any of these occur:    Skin by the nail becomes red, swollen, painful, or drains pus (a creamy yellow or white liquid)    Side effects from oral anti-fungal medicines  Date Last Reviewed: 8/1/2016 2000-2017 [employer]. [address] [place] [address], [place] [address]. All rights reserved. This information is not intended as a substitute for professional medical care. Always follow your healthcare professional's instructions.

## 2018-07-03 ENCOUNTER — OFFICE VISIT (OUTPATIENT)
Dept: FAMILY MEDICINE | Facility: CLINIC | Age: 73
End: 2018-07-03
Payer: MEDICARE

## 2018-07-03 VITALS
DIASTOLIC BLOOD PRESSURE: 74 MMHG | TEMPERATURE: 97.9 F | WEIGHT: 149.9 LBS | BODY MASS INDEX: 24.57 KG/M2 | OXYGEN SATURATION: 97 % | HEART RATE: 69 BPM | SYSTOLIC BLOOD PRESSURE: 116 MMHG

## 2018-07-03 DIAGNOSIS — Z23 NEED FOR VACCINATION: ICD-10-CM

## 2018-07-03 DIAGNOSIS — R25.1 TREMOR: Primary | ICD-10-CM

## 2018-07-03 PROCEDURE — 90715 TDAP VACCINE 7 YRS/> IM: CPT | Performed by: FAMILY MEDICINE

## 2018-07-03 PROCEDURE — 99213 OFFICE O/P EST LOW 20 MIN: CPT | Mod: 25 | Performed by: FAMILY MEDICINE

## 2018-07-03 PROCEDURE — 90471 IMMUNIZATION ADMIN: CPT | Performed by: FAMILY MEDICINE

## 2018-07-03 NOTE — PROGRESS NOTES
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  For the past 1-2 years she has noticed that her head shakes when she's singing.  It tends to shake right to left as if she is saying \"no\".  She notes that she only gets tremors when she is relaxed.  She hasn't noticed any tremors anywhere else, although her friend has noted some shaking in her hands.  She has noticed the shaking more often recently.  Gets it about twice a week.  She can stop the shaking if she stiffens her muscles and concentrates.  Her father had Parkinson's and her sister has essential tremor.  She denies issues with vision, speech, hearing.  No numbness or tingling.  She has noticed some balance issues lately and is working on that at her gym.      Problem list and histories reviewed & adjusted, as indicated.  Additional history: as documented    Patient Active Problem List   Diagnosis     Hyperlipidemia LDL goal <160     Advance Care Planning     Cervicalgia     Routine general medical examination at a health care facility     Encounter for routine gynecological examination     Osteopenia     Need for prophylactic hormone replacement therapy (postmenopausal)     Gastroesophageal reflux disease, esophagitis presence not specified     Onychomycosis     Past Surgical History:   Procedure Laterality Date     BIOPSY      benign breast     COLONOSCOPY  10/1/2013    Procedure: COLONOSCOPY;  Colonoscopy ;  Surgeon: Adair Stoddard MD;  Location:  GI     EXCISE MASS UPPER EXTREMITY  5/20/2013    Procedure: EXCISE MASS UPPER EXTREMITY;  Remove Mass Right Upper Arm;  Surgeon: Elvira Wilson MD;  Location: [facility] OR     EYE SURGERY      lasik       Social History   Substance Use Topics     Smoking status: Never Smoker     Smokeless tobacco: Never Used     Alcohol use No     Family History   Problem Relation Age " of Onset     Cerebrovascular Disease Father      parkinsons     C.A.D. Mother      Unknown/Adopted Maternal Grandmother      Hypertension Maternal Grandfather      Hypertension Paternal Grandfather      Blood Disease Sister      Neurologic Disorder Sister      epilepsy     Arthritis Other      neck and thumb joints           Reviewed and updated as needed this visit by clinical staff  Tobacco  Allergies  Meds  Med Hx  Surg Hx  Fam Hx  Soc Hx      Reviewed and updated as needed this visit by Provider         ROS:  Constitutional, HEENT, cardiovascular, pulmonary, gi and gu systems are negative, except as otherwise noted.    OBJECTIVE:     /74 (BP Location: Left arm, Patient Position: Chair, Cuff Size: Adult Regular)  Pulse 69  Temp 97.9  F (36.6  C) (Oral)  Wt 149 lb 14.4 oz (68 kg)  SpO2 97%  Breastfeeding? No  BMI 24.57 kg/m2  Body mass index is 24.57 kg/(m^2).  GENERAL: healthy, alert and no distress  EYES: Eyes grossly normal to inspection, PERRL and EOMI, no nystagmus  HENT: ear canals and TM's normal, nose and mouth without ulcers or lesions  NECK: no adenopathy, no asymmetry, masses, or scars and thyroid normal to palpation  RESP: lungs clear to auscultation - no rales, rhonchi or wheezes  CV: regular rates and rhythm, normal S1 S2, no S3 or S4 and no murmur, click or rub  NEURO: Normal strength and tone.  Tremors noted in bilateral biceps when going through elbow ROM, but no rigidity.  I did witness a short episode of head tremor in the office that pt was able to stop after 2-3 seconds.  Cranial nerves 2-12 intact.  DTRs 2/4 bilat UE and LE.      ASSESSMENT/PLAN:     1. Tremor  - Concern for possible Parkinsons given + family history   - Offered to start workup, but pt prefers to see neuro first   - Will refer to [facility]  - NEUROLOGY ADULT REFERRAL    2. Need for vaccination  - TDAP VACCINE (ADACEL)    Follow up after neuro eval     Viky Gray DO  [facility]  [place]

## 2018-07-03 NOTE — MR AVS SNAPSHOT
After Visit Summary   7/3/2018    Alix Escobar    MRN: [id number]           Patient Information     Date Of Birth          1945        Visit Information        Provider Department      7/3/2018 11:20 AM Viky Gray,  [facility]        Today's Diagnoses     Tremor    -  1    Need for vaccination           Follow-ups after your visit        Additional Services     NEUROLOGY ADULT REFERRAL       Your provider has referred you for the following:   Consult at [facility]: [facility] [phone]   [email or web address]    Please be aware that coverage of these services is subject to the terms and limitations of your health insurance plan.  Call member services at your health plan with any benefit or coverage questions.      Please bring the following with you to your appointment:    (1) Any X-Rays, CTs or MRIs which have been performed.  Contact the facility where they were done to arrange for  prior to your scheduled appointment.    (2) List of current medications  (3) This referral request   (4) Any documents/labs given to you for this referral                  Who to contact     If you have questions or need follow up information about today's clinic visit or your schedule please contact [facility] directly at [phone].  Normal or non-critical lab and imaging results will be communicated to you by MyChart, letter or phone within 4 business days after the clinic has received the results. If you do not hear from us within 7 days, please contact the clinic through MyChart or phone. If you have a critical or abnormal lab result, we will notify you by phone as soon as possible.  Submit refill requests through [employer] or call your pharmacy and they will forward the refill request to us. Please allow 3 business days for your refill to be completed.          Additional Information About  Your Visit        [employer] Information     [employer] gives you secure access to your electronic health record. If you see a primary care provider, you can also send messages to your care team and make appointments. If you have questions, please call your primary care clinic.  If you do not have a primary care provider, please call [phone] and they will assist you.        Care EveryWhere ID     This is your Care EveryWhere ID. This could be used by other organizations to access your [place] medical records  [id number]        Your Vitals Were     Pulse Temperature Pulse Oximetry Breastfeeding? BMI (Body Mass Index)       69 97.9  F (36.6  C) (Oral) 97% No 24.57 kg/m2        Blood Pressure from Last 3 Encounters:   07/03/18 116/74   04/25/18 120/72   02/07/18 122/88    Weight from Last 3 Encounters:   07/03/18 149 lb 14.4 oz (68 kg)   04/25/18 150 lb (68 kg)   02/07/18 152 lb 3.2 oz (69 kg)              We Performed the Following     NEUROLOGY ADULT REFERRAL     TDAP VACCINE (ADACEL)        Primary Care Provider Office Phone # Fax #    Brent Casas [phone] [address] [facility] [address]        Equal Access to Services     RAY CABAN : Hadii aad ku hadasho Soomaali, waaxda luqadaha, qaybta kaalmada adeegyada, waxay aldain haytann citlaly anaya laviolet . So [facility] [phone].    ATENCIÓN: Si habla español, tiene a jordan disposición servicios gratuitos de asistencia lingüística. Llame al [phone].    We comply with applicable federal civil rights laws and [place] laws. We do not discriminate on the basis of race, color, national origin, age, disability, sex, sexual orientation, or gender identity.            Thank you!     Thank you for choosing [facility]  for your care. Our goal is always to provide you with excellent care. Hearing back from our patients is one way we can continue to improve our services. Please take a few minutes to complete the written  survey that you may receive in the mail after your visit with us. Thank you!             Your Updated Medication List - Protect others around you: Learn how to safely use, store and throw away your medicines at www.disposemymeds.org.          This list is accurate as of 7/3/18 11:48 AM.  Always use your most recent med list.                   Brand Name Dispense Instructions for use Diagnosis    calcium citrate-vitamin D 315-200 MG-UNIT Tabs per tablet    CITRACAL     Take 1 tablet by mouth daily        estradiol 0.1 MG/GM cream    ESTRACE    70 g    Place 2 g vaginally as needed    Need for prophylactic hormone replacement therapy (postmenopausal)       estradiol 0.5 MG tablet    ESTRACE    90 tablet    Take 1 tablet (0.5 mg) by mouth daily    Need for prophylactic hormone replacement therapy (postmenopausal)       Multi-vitamin Tabs tablet      Take 1 tablet by mouth daily        omeprazole 20 MG CR capsule    priLOSEC    90 capsule    Take 1 capsule (20 mg) by mouth daily    Gastroesophageal reflux disease, esophagitis presence not specified       PROGESTERONE      cream    Routine general medical examination at a health care facility

## 2018-08-02 ENCOUNTER — TRANSFERRED RECORDS (OUTPATIENT)
Dept: HEALTH INFORMATION MANAGEMENT | Facility: CLINIC | Age: 73
End: 2018-08-02

## 2018-10-12 ENCOUNTER — OFFICE VISIT (OUTPATIENT)
Dept: FAMILY MEDICINE | Facility: CLINIC | Age: 73
End: 2018-10-12
Payer: MEDICARE

## 2018-10-12 VITALS
HEART RATE: 80 BPM | TEMPERATURE: 97.9 F | DIASTOLIC BLOOD PRESSURE: 82 MMHG | WEIGHT: 150.7 LBS | HEIGHT: 65 IN | RESPIRATION RATE: 12 BRPM | BODY MASS INDEX: 25.11 KG/M2 | SYSTOLIC BLOOD PRESSURE: 120 MMHG

## 2018-10-12 DIAGNOSIS — H61.23 BILATERAL IMPACTED CERUMEN: Primary | ICD-10-CM

## 2018-10-12 PROCEDURE — 99213 OFFICE O/P EST LOW 20 MIN: CPT | Performed by: NURSE PRACTITIONER

## 2018-10-12 NOTE — PROGRESS NOTES
"HPI   SUBJECTIVE:   Alix Escobar is a 73 year old female who presents to clinic today for the following health issues:    Concern - PLUGGED EARS   Onset: 1 month or so     Description:   Plugged ears     Intensity: mild    Progression of Symptoms:  same    Accompanying Signs & Symptoms:  None.     Previous history of similar problem:   YES     Precipitating factors:   Worsened by: NOTHING     Alleviating factors:  Improved by: OTC drops-which seem to help for awhile.     Therapies Tried and outcome: OTC drops-seems to help for awhile     Ears flushed out about 6 months ago.  No pain in the ears.     Problem list and histories reviewed & adjusted, as indicated.  Additional history: as documented    Current Outpatient Prescriptions   Medication Sig Dispense Refill     calcium citrate-vitamin D (CITRACAL) 315-200 MG-UNIT TABS Take 1 tablet by mouth daily       estradiol (ESTRACE) 0.1 MG/GM cream Place 2 g vaginally as needed 70 g 11     estradiol (ESTRACE) 0.5 MG tablet Take 1 tablet (0.5 mg) by mouth daily 90 tablet 3     multivitamin, therapeutic with minerals (MULTI-VITAMIN) TABS Take 1 tablet by mouth daily       PROGESTERONE cream       Allergies   Allergen Reactions     Seasonal Allergies        Reviewed and updated as needed this visit by clinical staff  Tobacco  Allergies  Meds  Problems  Med Hx  Surg Hx  Fam Hx  Soc Hx        Reviewed and updated as needed this visit by Provider         ROS:  Constitutional, HEENT, cardiovascular, pulmonary, gi and gu systems are negative, except as otherwise noted.    OBJECTIVE:     /82 (BP Location: Right arm, Patient Position: Chair, Cuff Size: Adult Regular)  Pulse 80  Temp 97.9  F (36.6  C) (Oral)  Resp 12  Ht 5' 5.25\" (1.657 m)  Wt 150 lb 11.2 oz (68.4 kg)  BMI 24.89 kg/m2  Body mass index is 24.89 kg/(m^2).  GENERAL: healthy, alert and no distress  HENT: ear canals occluded with cerumen, unable to visualize TM's bilat, nose and mouth without " ulcers or lesions  NECK: no adenopathy, no asymmetry, masses, or scars and thyroid normal to palpation    Diagnostic Test Results:  none     ASSESSMENT/PLAN:   1. Bilateral impacted cerumen  Ears flushed in clinic today.  Upon reassessment canals clear and TM's intact bilat.  She reports symptoms are much improved.        MAMADOU Newby [facility]      Physical Exam

## 2018-10-12 NOTE — MR AVS SNAPSHOT
"              After Visit Summary   10/12/2018    Alix Escobar    MRN: [id number]           Patient Information     Date Of Birth          1945        Visit Information        Provider Department      10/12/2018 2:40 PM Yoselin Ritter APRN CNP [facility]        Today's Diagnoses     Bilateral impacted cerumen    -  1       Follow-ups after your visit        Who to contact     If you have questions or need follow up information about today's clinic visit or your schedule please contact [facility] directly at [phone].  Normal or non-critical lab and imaging results will be communicated to you by [employer], letter or phone within 4 business days after the clinic has received the results. If you do not hear from us within 7 days, please contact the clinic through [employer] or phone. If you have a critical or abnormal lab result, we will notify you by phone as soon as possible.  Submit refill requests through [employer] or call your pharmacy and they will forward the refill request to us. Please allow 3 business days for your refill to be completed.          Additional Information About Your Visit        MyChart Information     [employer] gives you secure access to your electronic health record. If you see a primary care provider, you can also send messages to your care team and make appointments. If you have questions, please call your primary care clinic.  If you do not have a primary care provider, please call [phone] and they will assist you.        Care EveryWhere ID     This is your Care EveryWhere ID. This could be used by other organizations to access your [place] medical records  [id number]        Your Vitals Were     Pulse Temperature Respirations Height BMI (Body Mass Index)       80 97.9  F (36.6  C) (Oral) 12 5' 5.25\" (1.657 m) 24.89 kg/m2        Blood Pressure from Last 3 Encounters:   10/12/18 120/82   07/03/18 116/74   04/25/18 120/72    Weight from " Last 3 Encounters:   10/12/18 150 lb 11.2 oz (68.4 kg)   07/03/18 149 lb 14.4 oz (68 kg)   04/25/18 150 lb (68 kg)              Today, you had the following     No orders found for display       Primary Care Provider Office Phone # Fax #    Brent Casas [phone] [address] [facility] [address]        Equal Access to Services     ARABELLA CABAN : Hadii aad ku hadasho Soomaali, waaxda luqadaha, qaybta kaalmada adeegyada, waxay idiin hayaan adeeg arturkarensarika laviolet . So [facility] [place] [facility] [phone].    ATENCIÓN: Si jenifer acuña, tiene a jordan disposición servicios gratuitos de asistencia lingüística. [facility] [phone].    We comply with applicable federal civil rights laws and [place] laws. We do not discriminate on the basis of race, color, national origin, age, disability, sex, sexual orientation, or gender identity.            Thank you!     Thank you for choosing [facility]  for your care. Our goal is always to provide you with excellent care. Hearing back from our patients is one way we can continue to improve our services. Please take a few minutes to complete the written survey that you may receive in the mail after your visit with us. Thank you!             Your Updated Medication List - Protect others around you: Learn how to safely use, store and throw away your medicines at www.disposemymeds.org.          This list is accurate as of 10/12/18  3:13 PM.  Always use your most recent med list.                   Brand Name Dispense Instructions for use Diagnosis    calcium citrate-vitamin D 315-200 MG-UNIT Tabs per tablet    CITRACAL     Take 1 tablet by mouth daily        estradiol 0.1 MG/GM cream    ESTRACE    70 g    Place 2 g vaginally as needed    Need for prophylactic hormone replacement therapy (postmenopausal)       estradiol 0.5 MG tablet    ESTRACE    90 tablet    Take 1 tablet (0.5 mg) by mouth daily    Need for prophylactic hormone replacement therapy  (postmenopausal)       Multi-vitamin Tabs tablet      Take 1 tablet by mouth daily        PROGESTERONE      cream    Routine general medical examination at a health care facility

## 2018-10-12 NOTE — NURSING NOTE
"Chief Complaint   Patient presents with     Ear Problem     plugged ears      Initial /82 (BP Location: Right arm, Patient Position: Chair, Cuff Size: Adult Regular)  Pulse 80  Temp 97.9  F (36.6  C) (Oral)  Resp 12  Ht 5' 5.25\" (1.657 m)  Wt 150 lb 11.2 oz (68.4 kg)  BMI 24.89 kg/m2 Estimated body mass index is 24.89 kg/(m^2) as calculated from the following:    Height as of this encounter: 5' 5.25\" (1.657 m).    Weight as of this encounter: 150 lb 11.2 oz (68.4 kg).  BP completed using cuff size regular right arm    Lisa Magill, CMA    "

## 2018-11-04 DIAGNOSIS — Z79.890 NEED FOR PROPHYLACTIC HORMONE REPLACEMENT THERAPY (POSTMENOPAUSAL): ICD-10-CM

## 2018-11-05 RX ORDER — ESTRADIOL 0.5 MG/1
TABLET ORAL
Qty: 90 TABLET | Refills: 0 | Status: SHIPPED | OUTPATIENT
Start: 2018-11-05 | End: 2019-01-31

## 2018-11-05 NOTE — TELEPHONE ENCOUNTER
Medication is being filled for 1 time refill only due to:  Patient needs to be seen because it has been more than one year since last visit.   Kathia MONTGOMERY RN

## 2019-01-08 ENCOUNTER — OFFICE VISIT (OUTPATIENT)
Dept: URGENT CARE | Facility: URGENT CARE | Age: 74
End: 2019-01-08
Payer: MEDICARE

## 2019-01-08 VITALS
HEART RATE: 88 BPM | DIASTOLIC BLOOD PRESSURE: 92 MMHG | TEMPERATURE: 97.7 F | RESPIRATION RATE: 16 BRPM | SYSTOLIC BLOOD PRESSURE: 134 MMHG

## 2019-01-08 DIAGNOSIS — M54.50 ACUTE LEFT-SIDED LOW BACK PAIN WITHOUT SCIATICA: Primary | ICD-10-CM

## 2019-01-08 DIAGNOSIS — R03.0 ELEVATED BP WITHOUT DIAGNOSIS OF HYPERTENSION: ICD-10-CM

## 2019-01-08 LAB

## 2019-01-08 PROCEDURE — 81001 URINALYSIS AUTO W/SCOPE: CPT | Performed by: FAMILY MEDICINE

## 2019-01-08 PROCEDURE — 99214 OFFICE O/P EST MOD 30 MIN: CPT | Performed by: FAMILY MEDICINE

## 2019-01-08 NOTE — PROGRESS NOTES
SUBJECTIVE  HPI: Alix Escobar is a 73 year old female who presents for evaluation of back pain  Symptoms began a few days ago, have been onset acute and frequency intermittant and are worse at night.  Pain is located in the left low back region, without radiation, and are moderately severe.  Recent injury:none recalled by the patient  Personal hx of back pain is recurrent self limited episodes of low back pain in the past.  There isn't a certain position that seems to make the pain better or worse.  Pain is relieved by: OTC NSAIDs to some extent  [unfilled] sx include: none.  Red flag symptoms: negative.  No changes in bowel or bladder habits.      Hasn't had any dysuria or hematuria.    Past Medical History:   Diagnosis Date     Advanced directives, counseling/discussion 6/29/2011     Cervicalgia 8/8/2011     Dysphagia      Hyperlipidemia LDL goal <130 4/15/2011     Hyperlipidemia LDL goal <160 4/15/2011    High HDL     Osteoarthritides      Osteopenia 9/20/2011     Routine general medical examination at a health care facility 9/20/2011     Routine gynecological examination 9/20/2011     Umbilical hernia      Current Outpatient Medications   Medication Sig Dispense Refill     calcium citrate-vitamin D (CITRACAL) 315-200 MG-UNIT TABS Take 1 tablet by mouth daily       estradiol (ESTRACE) 0.1 MG/GM cream Place 2 g vaginally as needed 70 g 11     multivitamin, therapeutic with minerals (MULTI-VITAMIN) TABS Take 1 tablet by mouth daily       PROGESTERONE cream       estradiol (ESTRACE) 0.5 MG tablet TAKE ONE TABLET BY MOUTH ONCE DAILY (Patient not taking: Reported on 1/8/2019) 90 tablet 0     Social History     Tobacco Use     Smoking status: Never Smoker     Smokeless tobacco: Never Used   Substance Use Topics     Alcohol use: No       ROS:  No fevers.  No rashes.    OBJECTIVE:  BP (!) 134/92 (BP Location: Right arm, Patient Position: Sitting, Cuff Size: Adult Regular)   Pulse 88   Temp 97.7  F (36.5  C)  (Oral)   Resp 16   Back examination: Back symmetric, no grossly abnormal curvature. ROM normal. No CVA tenderness.  No bony midline tenderness.  [unfilled] leg raise test: negative  GENERAL APPEARANCE: healthy, alert and no distress  RESP: lungs clear to auscultation - no rales, rhonchi or wheezes  CV: regular rate and rhythm, normal S1 S2, no murmur noted  NEURO: Normal strength and tone with no weakness or sensory deficit noted, reflexes normal   EXT: no ankle edema  SKIN: no suspicious lesions or rashes    Results for orders placed or performed in visit on 01/08/19   *UA reflex to Microscopic and Culture ([facility] (except [place] and [place])   Result Value Ref Range    Color Urine Yellow     Appearance Urine Clear     Glucose Urine Negative NEG^Negative mg/dL    Bilirubin Urine Negative NEG^Negative    Ketones Urine Negative NEG^Negative mg/dL    Specific Gravity Urine 1.010 1.003 - 1.035    Blood Urine Trace (A) NEG^Negative    pH Urine 6.0 5.0 - 7.0 pH    Protein Albumin Urine Negative NEG^Negative mg/dL    Urobilinogen Urine 0.2 0.2 - 1.0 EU/dL    Nitrite Urine Negative NEG^Negative    Leukocyte Esterase Urine Negative NEG^Negative    Source Midstream Urine    Urine Microscopic   Result Value Ref Range    WBC Urine 0 - 5 OTO5^0 - 5 /HPF    RBC Urine O - 2 OTO2^O - 2 /HPF    Squamous Epithelial /LPF Urine Few FEW^Few /LPF    Bacteria Urine Few (A) NEG^Negative /HPF         ASSESSMENT/IMPRESSION:  Low back pain, L side, without sciatica  Elevated BP    PLAN:  .  Patient Instructions   No evidence of kidney stone on urine test or exam today.    Continue Aleve (naproxen) two pills twice daily for 3 days.  Take this with food.    If still having discomfort over the weekend, consider follow up with chiropractor or primary care MD early next week.  Follow up sooner if worsening.    Blood pressure recheck 134/92.  Please follow up within 2 weeks for another BP check.  You can do this with a  nurse only visit at your primary care office or through a [facility] pharmacy.    Patient to follow up with Primary Care provider regarding elevated blood pressure.

## 2019-01-08 NOTE — PATIENT INSTRUCTIONS
No evidence of kidney stone on urine test or exam today.    Continue Aleve (naproxen) two pills twice daily for 3 days.  Take this with food.    If still having discomfort over the weekend, consider follow up with chiropractor or primary care MD early next week.  Follow up sooner if worsening.    Blood pressure recheck 134/92.  Please follow up within 2 weeks for another BP check.  You can do this with a nurse only visit at your primary care office or through a [facility] pharmacy.    Patient to follow up with Primary Care provider regarding elevated blood pressure.

## 2019-01-15 ENCOUNTER — OFFICE VISIT (OUTPATIENT)
Dept: FAMILY MEDICINE | Facility: CLINIC | Age: 74
End: 2019-01-15
Payer: MEDICARE

## 2019-01-15 VITALS
WEIGHT: 152 LBS | RESPIRATION RATE: 14 BRPM | DIASTOLIC BLOOD PRESSURE: 83 MMHG | HEART RATE: 77 BPM | TEMPERATURE: 97.5 F | SYSTOLIC BLOOD PRESSURE: 127 MMHG | HEIGHT: 65 IN | BODY MASS INDEX: 25.33 KG/M2

## 2019-01-15 DIAGNOSIS — M54.50 ACUTE LEFT-SIDED LOW BACK PAIN WITHOUT SCIATICA: Primary | ICD-10-CM

## 2019-01-15 PROCEDURE — 99213 OFFICE O/P EST LOW 20 MIN: CPT | Performed by: PHYSICIAN ASSISTANT

## 2019-01-15 ASSESSMENT — MIFFLIN-ST. JEOR: SCORE: 1199.31

## 2019-01-15 NOTE — PROGRESS NOTES
"  SUBJECTIVE:   Alix Escobar is a 73 year old female who presents to clinic today for the following health issues:      ED/UC Followup: Left sided lower back pain without sciatica     Facility:  [facility]   Date of visit: 1/8/19  Reason for visit: extreme lower back pain  Current Status:  \"I feel back to normal.\"             Problem list and histories reviewed & adjusted, as indicated.  Additional history: as documented    Patient Active Problem List   Diagnosis     Hyperlipidemia LDL goal <160     Advance Care Planning     Cervicalgia     Routine general medical examination at a health care facility     Encounter for routine gynecological examination     Osteopenia     Need for prophylactic hormone replacement therapy (postmenopausal)     Gastroesophageal reflux disease, esophagitis presence not specified     Onychomycosis     Past Surgical History:   Procedure Laterality Date     BIOPSY      benign breast     COLONOSCOPY  10/1/2013    Procedure: COLONOSCOPY;  Colonoscopy ;  Surgeon: Adair Stoddard MD;  Location: [facility]     EXCISE MASS UPPER EXTREMITY  5/20/2013    Procedure: EXCISE MASS UPPER EXTREMITY;  Remove Mass Right Upper Arm;  Surgeon: Elvira Wilson MD;  Location: [facility] OR     EYE SURGERY      lasik       Social History     Tobacco Use     Smoking status: Never Smoker     Smokeless tobacco: Never Used   Substance Use Topics     Alcohol use: No     Family History   Problem Relation Age of Onset     Cerebrovascular Disease Father         parkinsons     C.A.D. Mother      Unknown/Adopted Maternal Grandmother      Hypertension Maternal Grandfather      Hypertension Paternal Grandfather      Blood Disease Sister      Neurologic Disorder Sister         epilepsy     Arthritis Other         neck and thumb joints           Reviewed and updated as needed this visit by clinical staff  Tobacco  Allergies  Meds  Med Hx  Surg Hx  Fam Hx  Soc Hx      Reviewed and updated as needed this visit by " "Provider         ROS:  Constitutional, HEENT, cardiovascular, pulmonary, gi and gu systems are negative, except as otherwise noted.    OBJECTIVE:     /83 (BP Location: Right arm, Patient Position: Chair, Cuff Size: Adult Large)   Pulse 77   Temp 97.5  F (36.4  C) (Oral)   Resp 14   Ht 1.657 m (5' 5.25\")   Wt 68.9 kg (152 lb)   BMI 25.10 kg/m    Body mass index is 25.1 kg/m .  GENERAL APPEARANCE: healthy, alert and no distress  ORTHO: Lumber/Thoracic Spine Exam: Tender:  left para lumbar muscles  Non-tender:  lumbar spinous processes, lumbar facet joints, left SI joint, left sciatic notch  Range of Motion:  FROM          ASSESSMENT/PLAN:             1. Acute left-sided low back pain without sciatica  Resolving.  Continue heat  Rest and may use NSAIDS as needed           Danielle Borrero PA-C  [facility]    "

## 2019-01-30 DIAGNOSIS — Z79.890 NEED FOR PROPHYLACTIC HORMONE REPLACEMENT THERAPY (POSTMENOPAUSAL): ICD-10-CM

## 2019-01-31 RX ORDER — ESTRADIOL 0.5 MG/1
0.5 TABLET ORAL DAILY
Qty: 30 TABLET | Refills: 0 | Status: SHIPPED | OUTPATIENT
Start: 2019-01-31 | End: 2019-02-21

## 2019-01-31 NOTE — TELEPHONE ENCOUNTER
[employer] sent requesting appt with Dr Andrews as pt has not seen her since 9/2017  30 days sent    Manjit Alicea RN, BSN

## 2019-01-31 NOTE — TELEPHONE ENCOUNTER
"Requested Prescriptions   Pending Prescriptions Disp Refills     estradiol (ESTRACE) 0.5 MG tablet  PATIENT NEEDS AN OFFICE VISIT FOR FURTHER REFILLS.  Last Written Prescription Date:  11/5/18  Last Fill Quantity: 90 TABLET,  # refills: 0   Last office visit: 9/26/17 with prescribing provider:  [facility]  Future Office Visit:     90 tablet 0     Sig: Take 1 tablet (0.5 mg) by mouth daily    Hormone Replacement Therapy Failed - 1/30/2019  7:33 PM       Failed - Recent (12 mo) or future (30 days) visit within the authorizing provider's specialty    Patient had office visit in the last 12 months or has a visit in the next 30 days with authorizing provider or within the authorizing provider's specialty.  See \"Patient Info\" tab in inbasket, or \"Choose Columns\" in Meds & Orders section of the refill encounter.             Passed - Blood pressure under 140/90 in past 12 months    BP Readings from Last 3 Encounters:   01/15/19 127/83   01/08/19 (!) 134/92   10/12/18 120/82                Passed - Patient has mammogram in past 2 years on file if age 50-75  LAST MAMMOGRAM: 11/06/2017.       Passed - Medication is active on med list       Passed - Patient is 18 years of age or older       Passed - No active pregnancy on record       Passed - No positive pregnancy test on record in past 12 months          "

## 2019-02-20 NOTE — PROGRESS NOTES
SUBJECTIVE:  Alix Escobar is an 73 year old  postmenopausal woman   who presents for annual gyn exam. No bleeding, spotting, or discharge noted.     Estrogen replacement therapy: continuous daily oral & vaginal regimen, previously was taking   OTC progesterone cream  MARSHA exposure: no  History of abnormal Pap smear: No  Family history of uterine or ovarian cancer: No  Regular self breast exam: No  History of abnormal mammogram: No  Family history of breast cancer: No  History of abnormal lipids: Yes - Hyperlipidemia        - Pt is using the daily estrogen [oral & vaginal cream], has been using topical progesterone cream but states she misses applying this on occasion. She would like to instead be placed on an oral progesterone, as she finds this easier to remember taking.         Past Medical History:   Diagnosis Date     Advanced directives, counseling/discussion 2011     Cervicalgia 2011     Dysphagia      Hyperlipidemia LDL goal <130 4/15/2011     Hyperlipidemia LDL goal <160 4/15/2011    High HDL     Osteoarthritides      Osteopenia 2011     Routine general medical examination at a health care facility 2011     Routine gynecological examination 2011     Umbilical hernia           Family History   Problem Relation Age of Onset     Cerebrovascular Disease Father         parkinsons     C.A.D. Mother      Unknown/Adopted Maternal Grandmother      Hypertension Maternal Grandfather      Hypertension Paternal Grandfather      Blood Disease Sister      Neurologic Disorder Sister         epilepsy     Arthritis Other         neck and thumb joints       Past Surgical History:   Procedure Laterality Date     BIOPSY      benign breast     COLONOSCOPY  10/1/2013    Procedure: COLONOSCOPY;  Colonoscopy ;  Surgeon: Adair Stoddard MD;  Location:  GI     EXCISE MASS UPPER EXTREMITY  2013    Procedure: EXCISE MASS UPPER EXTREMITY;  Remove Mass Right Upper Arm;  Surgeon: Steve  "Elvira Yanez MD;  Location: [facility] OR     EYE SURGERY      lasik       Current Outpatient Medications   Medication     calcium citrate-vitamin D (CITRACAL) 315-200 MG-UNIT TABS     estradiol (ESTRACE) 0.1 MG/GM cream     estradiol (ESTRACE) 0.5 MG tablet     multivitamin, therapeutic with minerals (MULTI-VITAMIN) TABS     progesterone (PROMETRIUM) 100 MG capsule     PROGESTERONE     No current facility-administered medications for this visit.      Allergies   Allergen Reactions     Seasonal Allergies        Social History     Tobacco Use     Smoking status: Never Smoker     Smokeless tobacco: Never Used   Substance Use Topics     Alcohol use: No       Review Of Systems  Ears/Nose/Throat: negative  Respiratory: No shortness of breath, dyspnea on exertion, cough, or hemoptysis  Cardiovascular: negative  Gastrointestinal: negative  Genitourinary: negative  Constitutional, HEENT, cardiovascular, pulmonary, GI, , musculoskeletal, neuro, skin, endocrine and psych systems are negative, except as otherwise noted.        This document serves as a record of the services and decisions personally performed and made by Monika Andrews DO. It was created on her behalf by Azeb Loyd, a trained medical scribe. The creation of this document is based the provider's statements to the medical scribe.  Scribe Azeb Loyd 3:05 PM, February 21, 2019    OBJECTIVE:  /80 (BP Location: Left arm, Patient Position: Sitting, Cuff Size: Adult Regular)   Ht 1.657 m (5' 5.25\")   Wt 69.6 kg (153 lb 6.4 oz)   BMI 25.33 kg/m    General appearance: healthy, alert and no distress  Skin: Skin color, texture, turgor normal. No rashes or lesions.  Ears: negative  Nose/Sinuses: Nares normal. Septum midline. Mucosa normal. No drainage or sinus tenderness.  Oropharynx: Lips, mucosa, and tongue normal. Teeth and gums normal.  Neck: Neck supple. No adenopathy. Thyroid symmetric, normal size,, Carotids without bruits.  Lungs: negative, Percussion " normal. Good diaphragmatic excursion. Lungs clear  Heart: negative, PMI normal. No lifts, heaves, or thrills. RRR. No murmurs, clicks gallops or rub  Breasts: Inspection negative. No nipple discharge or bleeding. No masses.  Abdomen: Abdomen soft, non-tender. BS normal. No masses, organomegaly  Pelvic: Pelvic examination without pap/ Gonorrhea and Chlamydia   including  External genitalia normal   and vagina normal rugatted moderately atrophic  Examination of urethra  normal no masses, tenderness, scarring  bladder, no masses or tenderness  Cervix no lesions or discharge  Bimanual exam with   Uterus 6 weeks size, mid position, mobile, no tenderness, no descent   Adnexa/parametria normal            ASSESSMENT:  Alix Escobar is an 73 year old  postmenopausal woman   who presents for annual gyn exam.     PLAN:  Dx: Annual gyn physical; Estrogen replacement therapy  1)  Will return for fasting labs; Mammogram ordered; Pap smear not indicated at this age  2)  Hormone replacement therapy: Estradiol refilled   - Oral progesterone added/prescribed & will begin taking, informed this will help  prevent uterine cancer from developing, but with more than 5 years of use, can put her at risk for breast cancer.  She understands the risks  3)  Return to clinic in 1 year for annual gyn physical; otherwise as needed.       Rx:  - estradiol 0.5 mg 1 tab po daily  - progesterone 100 mg 1 capsule po daily        PE:  Reviewed health maintenance including diet, regular exercise,   estrogen replacement and periodic exams.        The information in this document, created by the medical scribe for me, accurately reflects the services I personally performed and the decisions made by me. I have reviewed and approved this document for accuracy prior to leaving the patient care area.  3:05 PM, 19    Dr. Monika Andrews, DO    Obstetrics and Gynecology  [facility]

## 2019-02-21 ENCOUNTER — OFFICE VISIT (OUTPATIENT)
Dept: OBGYN | Facility: CLINIC | Age: 74
End: 2019-02-21
Payer: MEDICARE

## 2019-02-21 VITALS
BODY MASS INDEX: 25.56 KG/M2 | HEIGHT: 65 IN | WEIGHT: 153.4 LBS | SYSTOLIC BLOOD PRESSURE: 132 MMHG | DIASTOLIC BLOOD PRESSURE: 80 MMHG

## 2019-02-21 DIAGNOSIS — Z00.00 ENCOUNTER FOR PREVENTATIVE ADULT HEALTH CARE EXAMINATION: Primary | ICD-10-CM

## 2019-02-21 DIAGNOSIS — N95.1 HOT FLUSHES, PERIMENOPAUSAL: ICD-10-CM

## 2019-02-21 DIAGNOSIS — Z79.890 NEED FOR PROPHYLACTIC HORMONE REPLACEMENT THERAPY (POSTMENOPAUSAL): ICD-10-CM

## 2019-02-21 DIAGNOSIS — Z12.31 ENCOUNTER FOR SCREENING MAMMOGRAM FOR MALIGNANT NEOPLASM OF BREAST: ICD-10-CM

## 2019-02-21 PROCEDURE — G0101 CA SCREEN;PELVIC/BREAST EXAM: HCPCS | Performed by: FAMILY MEDICINE

## 2019-02-21 PROCEDURE — 99397 PER PM REEVAL EST PAT 65+ YR: CPT | Performed by: FAMILY MEDICINE

## 2019-02-21 RX ORDER — ESTRADIOL 0.5 MG/1
0.5 TABLET ORAL DAILY
Qty: 90 TABLET | Refills: 3 | Status: SHIPPED | OUTPATIENT
Start: 2019-02-21 | End: 2020-03-24

## 2019-02-21 ASSESSMENT — MIFFLIN-ST. JEOR: SCORE: 1205.66

## 2019-02-21 NOTE — PATIENT INSTRUCTIONS
Call Lab [phone] [place] or [phone] [place] to schedule future labs. You may schedule   The labs at any [place] facitily.  If you are getting cholesterol checked please fast 8 hours     Return yearly     Dr. Monika Andrews, DO    Obstetrics and Gynecology  [facility] [place] [facility] [place]

## 2019-02-21 NOTE — NURSING NOTE
"Chief Complaint   Patient presents with     Gyn Exam     needs refill on estradiol       Initial /80 (BP Location: Left arm, Patient Position: Sitting, Cuff Size: Adult Regular)   Ht 1.657 m (5' 5.25\")   Wt 69.6 kg (153 lb 6.4 oz)   BMI 25.33 kg/m   Estimated body mass index is 25.33 kg/m  as calculated from the following:    Height as of this encounter: 1.657 m (5' 5.25\").    Weight as of this encounter: 69.6 kg (153 lb 6.4 oz).  BP completed using cuff size: regular    Questioned patient about current smoking habits.  Pt. has never smoked.          The following HM Due: NONE             "

## 2019-03-11 DIAGNOSIS — Z00.00 ENCOUNTER FOR PREVENTATIVE ADULT HEALTH CARE EXAMINATION: ICD-10-CM

## 2019-03-11 LAB
ALBUMIN SERPL-MCNC: 3.9 G/DL (ref 3.4–5)
ALP SERPL-CCNC: 66 U/L (ref 40–150)
ALT SERPL W P-5'-P-CCNC: 23 U/L (ref 0–50)
ANION GAP SERPL CALCULATED.3IONS-SCNC: 7 MMOL/L (ref 3–14)
AST SERPL W P-5'-P-CCNC: 26 U/L (ref 0–45)
BILIRUB SERPL-MCNC: 0.6 MG/DL (ref 0.2–1.3)
BUN SERPL-MCNC: 20 MG/DL (ref 7–30)
CALCIUM SERPL-MCNC: 9 MG/DL (ref 8.5–10.1)
CHLORIDE SERPL-SCNC: 105 MMOL/L (ref 94–109)
CHOLEST SERPL-MCNC: 224 MG/DL
CO2 SERPL-SCNC: 27 MMOL/L (ref 20–32)
CREAT SERPL-MCNC: 0.8 MG/DL (ref 0.52–1.04)
ERYTHROCYTE [DISTWIDTH] IN BLOOD BY AUTOMATED COUNT: 13.6 % (ref 10–15)
GFR SERPL CREATININE-BSD FRML MDRD: 73 ML/MIN/{1.73_M2}
GLUCOSE SERPL-MCNC: 87 MG/DL (ref 70–99)
HCT VFR BLD AUTO: 41.9 % (ref 35–47)
HDLC SERPL-MCNC: 74 MG/DL
HGB BLD-MCNC: 13.6 G/DL (ref 11.7–15.7)
LDLC SERPL CALC-MCNC: 136 MG/DL
MCH RBC QN AUTO: 29.7 PG (ref 26.5–33)
MCHC RBC AUTO-ENTMCNC: 32.5 G/DL (ref 31.5–36.5)
MCV RBC AUTO: 92 FL (ref 78–100)
NONHDLC SERPL-MCNC: 150 MG/DL
PLATELET # BLD AUTO: 286 10E9/L (ref 150–450)
POTASSIUM SERPL-SCNC: 3.8 MMOL/L (ref 3.4–5.3)
PROT SERPL-MCNC: 7.1 G/DL (ref 6.8–8.8)
RBC # BLD AUTO: 4.58 10E12/L (ref 3.8–5.2)
SODIUM SERPL-SCNC: 139 MMOL/L (ref 133–144)
TRIGL SERPL-MCNC: 72 MG/DL
TSH SERPL DL<=0.005 MIU/L-ACNC: 3.52 MU/L (ref 0.4–4)
WBC # BLD AUTO: 5.6 10E9/L (ref 4–11)

## 2019-03-11 PROCEDURE — 80061 LIPID PANEL: CPT | Performed by: FAMILY MEDICINE

## 2019-03-11 PROCEDURE — 85027 COMPLETE CBC AUTOMATED: CPT | Performed by: FAMILY MEDICINE

## 2019-03-11 PROCEDURE — 84443 ASSAY THYROID STIM HORMONE: CPT | Performed by: FAMILY MEDICINE

## 2019-03-11 PROCEDURE — 80053 COMPREHEN METABOLIC PANEL: CPT | Performed by: FAMILY MEDICINE

## 2019-03-11 PROCEDURE — 36415 COLL VENOUS BLD VENIPUNCTURE: CPT | Performed by: FAMILY MEDICINE

## 2019-03-14 ENCOUNTER — ANCILLARY PROCEDURE (OUTPATIENT)
Dept: MAMMOGRAPHY | Facility: CLINIC | Age: 74
End: 2019-03-14
Attending: FAMILY MEDICINE
Payer: MEDICARE

## 2019-03-14 DIAGNOSIS — Z00.00 ENCOUNTER FOR PREVENTATIVE ADULT HEALTH CARE EXAMINATION: ICD-10-CM

## 2019-03-14 DIAGNOSIS — Z12.31 ENCOUNTER FOR SCREENING MAMMOGRAM FOR MALIGNANT NEOPLASM OF BREAST: ICD-10-CM

## 2019-03-14 PROCEDURE — 77067 SCR MAMMO BI INCL CAD: CPT | Mod: TC

## 2019-03-29 ENCOUNTER — TELEPHONE (OUTPATIENT)
Dept: FAMILY MEDICINE | Facility: CLINIC | Age: 74
End: 2019-03-29

## 2019-03-29 ENCOUNTER — OFFICE VISIT (OUTPATIENT)
Dept: URGENT CARE | Facility: URGENT CARE | Age: 74
End: 2019-03-29
Payer: MEDICARE

## 2019-03-29 VITALS
OXYGEN SATURATION: 97 % | DIASTOLIC BLOOD PRESSURE: 78 MMHG | BODY MASS INDEX: 24.59 KG/M2 | TEMPERATURE: 98.4 F | WEIGHT: 148.9 LBS | SYSTOLIC BLOOD PRESSURE: 134 MMHG | HEART RATE: 64 BPM

## 2019-03-29 DIAGNOSIS — R31.0 GROSS HEMATURIA: ICD-10-CM

## 2019-03-29 DIAGNOSIS — N30.01 ACUTE CYSTITIS WITH HEMATURIA: Primary | ICD-10-CM

## 2019-03-29 LAB
ALBUMIN UR-MCNC: 100 MG/DL
APPEARANCE UR: CLEAR
BACTERIA #/AREA URNS HPF: ABNORMAL /HPF
BILIRUB UR QL STRIP: ABNORMAL
COLOR UR AUTO: ABNORMAL
GLUCOSE UR STRIP-MCNC: NEGATIVE MG/DL
HGB UR QL STRIP: ABNORMAL
KETONES UR STRIP-MCNC: ABNORMAL MG/DL
LEUKOCYTE ESTERASE UR QL STRIP: ABNORMAL
NITRATE UR QL: NEGATIVE
NON-SQ EPI CELLS #/AREA URNS LPF: ABNORMAL /LPF
PH UR STRIP: 7 PH (ref 5–7)
RBC #/AREA URNS AUTO: >100 /HPF
SOURCE: ABNORMAL
SP GR UR STRIP: 1.01 (ref 1–1.03)
UROBILINOGEN UR STRIP-ACNC: 0.2 EU/DL (ref 0.2–1)
WBC #/AREA URNS AUTO: ABNORMAL /HPF

## 2019-03-29 PROCEDURE — 81001 URINALYSIS AUTO W/SCOPE: CPT | Performed by: FAMILY MEDICINE

## 2019-03-29 PROCEDURE — 99213 OFFICE O/P EST LOW 20 MIN: CPT | Performed by: FAMILY MEDICINE

## 2019-03-29 RX ORDER — SULFAMETHOXAZOLE/TRIMETHOPRIM 800-160 MG
1 TABLET ORAL 2 TIMES DAILY
Qty: 6 TABLET | Refills: 0 | Status: SHIPPED | OUTPATIENT
Start: 2019-03-29 | End: 2019-04-11

## 2019-03-29 NOTE — TELEPHONE ENCOUNTER
Discussed with SANDY, recommend UC evaluation, called pt, agrees, informed [facility] or [facility] have urgent care available today  Alicia Phillip RN, BSN  Message handled by Nurse Triage with [facility] - provider name: SANDY.

## 2019-03-29 NOTE — TELEPHONE ENCOUNTER
Pt calls, c/o hematuria today x 3, also pinkness on toilet paper, on hormones from OB, denies UTI symptoms, no other complaints, denies hx of kidney stones but did have episode of back pain in January and told no issues, also wants CJ as PCP-ok to change?, discussed UC eval, will huddle with CJ how to proceed since pt stable, route for call back    Next 5 appointments (look out 90 days)    Apr 11, 2019  1:15 PM CDT  MyChart Long with Danielle Borrero PA-C  [facility] ([facility] [id number] [place] [id number] [phone]        Telephone Information:   Mobile [phone]     Alicia Phillip RN, BSN  Message handled by Nurse Triage.

## 2019-03-29 NOTE — PATIENT INSTRUCTIONS
Drink plenty of water    follow up with your primary care provider if not better in 4-5 days.      Go to there emergency room if you experiencing kidney pain, fevers, vomiting.

## 2019-03-29 NOTE — PROGRESS NOTES
SUBJECTIVE:   Alix Escobar is a 73 year old female who  presents today with two complaints.    Complaint #1:  for hematuria.   Symptoms of blood in the urine have been going on since this morning.. No urgency/urinary frequency Hematuria yes.  .  There is no history of fever, chills, nausea or vomiting.  No history of vaginal discharge. This patient does not have a history of urinary tract infections.      Complaint #2:  Dry, cracked corners of the lips and mouth and red tongue for the past three weeks.  No supplements or foods with red food dye.  No bleeding.  She used to be a nutritional supplement containing Vitamin D3 (bone and joint essentials by Dr. Pierre) from three weeks ago.  She had been increasing the dose from once a day for a couple weeks to about 2-3 pills/day afterwards.  She stopped the supplements four days ago once she started noticing the mouth changes.  .  No other new foods.    She started a new Cream on the skin recently.  Estradiol 0.5 mg tablets and Progesterone 100 mg capsules were also recently started on February 21, 2019.       Past Medical History:   Diagnosis Date     Advanced directives, counseling/discussion 6/29/2011     Cervicalgia 8/8/2011     Dysphagia      Hyperlipidemia LDL goal <130 4/15/2011     Hyperlipidemia LDL goal <160 4/15/2011    High HDL     Osteoarthritides      Osteopenia 9/20/2011     Routine general medical examination at a health care facility 9/20/2011     Routine gynecological examination 9/20/2011     Umbilical hernia      Current Outpatient Medications   Medication Sig Dispense Refill     calcium citrate-vitamin D (CITRACAL) 315-200 MG-UNIT TABS Take 1 tablet by mouth daily       estradiol (ESTRACE) 0.1 MG/GM cream Place 2 g vaginally as needed 70 g 11     estradiol (ESTRACE) 0.5 MG tablet Take 1 tablet (0.5 mg) by mouth daily 90 tablet 3     multivitamin, therapeutic with minerals (MULTI-VITAMIN) TABS Take 1 tablet by mouth daily       progesterone  (PROMETRIUM) 100 MG capsule Take 1 capsule (100 mg) by mouth daily 90 capsule 0     PROGESTERONE cream       Social History     Tobacco Use     Smoking status: Never Smoker     Smokeless tobacco: Never Used   Substance Use Topics     Alcohol use: No       ROS:   CONSTITUTIONAL:NEGATIVE for fever, chills, change in weight  ENT/MOUTH: POSITIVE for red tongue  : positive for blood in the urine.     OBJECTIVE:  /78   Pulse 64   Temp 98.4  F (36.9  C) (Tympanic)   Wt 67.5 kg (148 lb 14.4 oz)   SpO2 97%   BMI 24.59 kg/m    GENERAL APPEARANCE: healthy, alert and no distress  HENT: Lips:  No cracked lips.  Mouth is moist.  Tongue is mildly erythematous without white papules/macules. No edema at the tongue.  The buccal mucosa and the palate are within normal limits.     LAB:    Results for orders placed or performed in visit on 03/29/19   UA reflex to Microscopic and Culture   Result Value Ref Range    Color Urine Red     Appearance Urine Clear     Glucose Urine Negative NEG^Negative mg/dL    Bilirubin Urine Small (A) NEG^Negative    Ketones Urine Trace (A) NEG^Negative mg/dL    Specific Gravity Urine 1.015 1.003 - 1.035    Blood Urine Large (A) NEG^Negative    pH Urine 7.0 5.0 - 7.0 pH    Protein Albumin Urine 100 (A) NEG^Negative mg/dL    Urobilinogen Urine 0.2 0.2 - 1.0 EU/dL    Nitrite Urine Negative NEG^Negative    Leukocyte Esterase Urine Trace (A) NEG^Negative    Source Midstream Urine    Urine Microscopic   Result Value Ref Range    WBC Urine 5-10 (A) OTO5^0 - 5 /HPF    RBC Urine >100 (A) OTO2^O - 2 /HPF    Squamous Epithelial /LPF Urine Few FEW^Few /LPF    Bacteria Urine Moderate (A) NEG^Negative /HPF         ASSESSMENT:   Gross Hematuria  Acute Cystitis with hematuria    PLAN:  Drink plenty of fluids.  Signs and symptoms of pyelonephritis mentioned.  Follow up with primary care physician if not improving in 4-5 days.   Go to the emergency room if any kidney pain/fevers/vomiting appears.            MD Heriberto

## 2019-04-11 ENCOUNTER — OFFICE VISIT (OUTPATIENT)
Dept: FAMILY MEDICINE | Facility: CLINIC | Age: 74
End: 2019-04-11
Payer: MEDICARE

## 2019-04-11 VITALS
SYSTOLIC BLOOD PRESSURE: 119 MMHG | DIASTOLIC BLOOD PRESSURE: 77 MMHG | WEIGHT: 148 LBS | OXYGEN SATURATION: 97 % | TEMPERATURE: 97.7 F | BODY MASS INDEX: 24.44 KG/M2 | HEART RATE: 77 BPM | RESPIRATION RATE: 12 BRPM

## 2019-04-11 DIAGNOSIS — E78.5 HYPERLIPIDEMIA LDL GOAL <160: ICD-10-CM

## 2019-04-11 DIAGNOSIS — R68.2 DRY MOUTH: Primary | ICD-10-CM

## 2019-04-11 PROCEDURE — 99214 OFFICE O/P EST MOD 30 MIN: CPT | Performed by: PHYSICIAN ASSISTANT

## 2019-04-11 NOTE — PROGRESS NOTES
SUBJECTIVE:   Alix Escobar is a 73 year old female who presents to clinic today for the following   health issues:      Concern - Drop mouth/lips  Onset: since March 5th    Description:   Dry mouth and lips    Intensity: moderate    Progression of Symptoms:  Same-tried stopping supplements and meds not working    Accompanying Signs & Symptoms:  Red tongue and corner of mouth dryness,    Previous history of similar problem:   none    Precipitating factors:   Worsened by: nothing    Alleviating factors:  Improved by: nothing    Therapies Tried and outcome: nothing    2) go over results-labs  Had cholesterol checked at OBGYN. Told risk was 13% and needs to be on statin.   Does not want this.     Additional history: as documented    Reviewed  and updated as needed this visit by clinical staff  Tobacco  Allergies  Meds         Reviewed and updated as needed this visit by Provider         Patient Active Problem List   Diagnosis     Hyperlipidemia LDL goal <160     Advance Care Planning     Cervicalgia     Routine general medical examination at a health care facility     Encounter for routine gynecological examination     Osteopenia     Need for prophylactic hormone replacement therapy (postmenopausal)     Gastroesophageal reflux disease, esophagitis presence not specified     Onychomycosis     Past Surgical History:   Procedure Laterality Date     BIOPSY      benign breast     COLONOSCOPY  10/1/2013    Procedure: COLONOSCOPY;  Colonoscopy ;  Surgeon: Adair Stoddard MD;  Location:  GI     EXCISE MASS UPPER EXTREMITY  5/20/2013    Procedure: EXCISE MASS UPPER EXTREMITY;  Remove Mass Right Upper Arm;  Surgeon: Elvira Wilson MD;  Location: [facility] OR     EYE SURGERY      lasik       Social History     Tobacco Use     Smoking status: Never Smoker     Smokeless tobacco: Never Used   Substance Use Topics     Alcohol use: No     Family History   Problem Relation Age of Onset     Cerebrovascular Disease  Father         parkinsons     C.A.D. Mother      Unknown/Adopted Maternal Grandmother      Hypertension Maternal Grandfather      Hypertension Paternal Grandfather      Blood Disease Sister      Neurologic Disorder Sister         epilepsy     Arthritis Other         neck and thumb joints           ROS:  Constitutional, HEENT, cardiovascular, pulmonary, gi and gu systems are negative, except as otherwise noted.    OBJECTIVE:     /77 (BP Location: Right arm, Patient Position: Chair, Cuff Size: Adult Regular)   Pulse 77   Temp 97.7  F (36.5  C) (Oral)   Resp 12   Wt 67.1 kg (148 lb)   SpO2 97%   BMI 24.44 kg/m    Body mass index is 24.44 kg/m .  GENERAL APPEARANCE: healthy, alert and no distress  HENT: ear canals and TM's normal and slightly dry muscosa  CV: regular rates and rhythm, normal S1 S2, no S3 or S4 and no murmur, click or rub        ASSESSMENT/PLAN:             1. Dry mouth  Biotene products, mouth moisture spray  Monitor symptoms     2. Hyperlipidemia LDL goal <160  Discussed options.  Does not want to take statin or stain-like over the counter product  Will look into diet and exercise, weight loss  Agreed to try cholestolff  Recheck 3 months.         Danielle Borrero PA-C  [facility]

## 2019-05-18 DIAGNOSIS — N95.1 HOT FLUSHES, PERIMENOPAUSAL: ICD-10-CM

## 2019-05-20 NOTE — TELEPHONE ENCOUNTER
"Prescription approved per [facility] Refill Protocol.  Note 2/21/19  \" Hormone replacement therapy: Estradiol refilled              - Oral progesterone added/prescribed & will begin taking, informed this will help    prevent uterine cancer from developing, but with more than 5 years of use, can put her at risk for breast cancer.  She understands the risks  3)  Return to clinic in 1 year for annual gyn physical; otherwise as needed.\"    Kelli Mckeon RN    "

## 2019-05-28 ENCOUNTER — TELEPHONE (OUTPATIENT)
Dept: OBGYN | Facility: CLINIC | Age: 74
End: 2019-05-28

## 2019-05-28 DIAGNOSIS — Z79.890 NEED FOR PROPHYLACTIC HORMONE REPLACEMENT THERAPY (POSTMENOPAUSAL): ICD-10-CM

## 2019-05-28 RX ORDER — ESTRADIOL 0.1 MG/G
2 CREAM VAGINAL
Qty: 70 G | Refills: 8 | Status: SHIPPED | OUTPATIENT
Start: 2019-05-30 | End: 2020-07-21

## 2019-05-28 NOTE — TELEPHONE ENCOUNTER
Pt calls for refill of estrace cream.    Okay to fill?  Last visit 2/21/19.  Taking oral progesterone 100mg and oral estradiol 0.5mg daily.      Mary Lou SANDRA R.N.  [facility]

## 2019-05-28 NOTE — TELEPHONE ENCOUNTER
Refilled   Please advise   Dr. Monika Andrews, DO    Obstetrics and Gynecology  [facility] [place]

## 2019-06-14 ENCOUNTER — OFFICE VISIT (OUTPATIENT)
Dept: FAMILY MEDICINE | Facility: CLINIC | Age: 74
End: 2019-06-14
Payer: MEDICARE

## 2019-06-14 VITALS
BODY MASS INDEX: 22.95 KG/M2 | WEIGHT: 139 LBS | OXYGEN SATURATION: 98 % | HEART RATE: 83 BPM | DIASTOLIC BLOOD PRESSURE: 66 MMHG | SYSTOLIC BLOOD PRESSURE: 114 MMHG | TEMPERATURE: 97.6 F

## 2019-06-14 DIAGNOSIS — H61.23 BILATERAL IMPACTED CERUMEN: Primary | ICD-10-CM

## 2019-06-14 PROCEDURE — 69209 REMOVE IMPACTED EAR WAX UNI: CPT | Performed by: PHYSICIAN ASSISTANT

## 2019-06-14 NOTE — PATIENT INSTRUCTIONS
Consider using over the counter ear softening drops  Return to clinic if symptoms recur      Patient Education       Earwax, Home Treatment    Everyone produces earwax from the lining of the ear canal. It serves to lubricate and protect the ear. The wax that forms in the canal naturally moves toward the outside of the ear and falls out. Sometimes the ear canal may contain too much wax. This can cause a blockage and loss of hearing. Directions are given below for home treatment.  Home care  If your doctor has advised you to remove a wax blockage yourself, follow these directions:    Unless a medicine was prescribed, you may use an over-the-counter product made for clearing earwax. These contain carbamide peroxide. Lie down with the blocked ear facing upward. Apply one dropper full of medicine and wait a few minutes. Grasp the outer ear and wiggle it to help the solution enter the canal.    Lean over a sink or basin with the blocked ear facing downward. Use a bulb syringe filled with warm (not hot or cold) water to rinse the ear several times. Use gentle pressure only.    If you are having trouble draining the water out of your ear canal, put a few drops of rubbing alcohol (isopropyl alcohol) into the ear canal. This will help remove the remaining water.    Repeat this procedure once a day for up to three days, or until your hearing is back to normal. Do not use this treatment for more than three days in a row.  Don ts    Don t use cold water to rinse the ear. This will make you dizzy.    Don t perform this procedure if you have an ear infection.    Don t perform this procedure if you have a ruptured eardrum.    Don t use cotton swabs, matches, hairpins, keys, or other objects to  clean  the ear canal. This can cause infection of the ear canal or rupture the eardrum. Because of their size and shape, cotton swabs can push earwax deeper into the ear canal instead of removing it.  Follow-up care  Follow up with your  health care provider if you are not improving after three cleaning attempts, or as advised.  When to seek medical advice  Call your health care provider right away if any of these occur:    Worsening ear pain    Fever of 101 F (38.3 C) or higher, or as directed by your health care provider    Hearing does not return to normal after three days of treatment    Fluid drainage or bleeding from the ear canal    Swelling, redness, or tenderness of the outer ear    Headache, neck pain, or stiff neck    8274-4917 [employer]. [address] [place], [place] [id number]. All rights reserved. This information is not intended as a substitute for professional medical care. Always follow your healthcare professional's instructions.

## 2019-06-14 NOTE — PROGRESS NOTES
Subjective     Alix Escobar is a 73 year old female who presents to clinic today for the following health issues:    HPI   73 year old female presenting for evaluation of bilateral cerumen impaction. Reports history of impactions and feels like current symptoms are the same. Denies pain. Only symptom is slightly muffled hearing. No other complaints at this time.         Patient Active Problem List   Diagnosis     Hyperlipidemia LDL goal <160     Advance Care Planning     Cervicalgia     Routine general medical examination at a health care facility     Encounter for routine gynecological examination     Osteopenia     Need for prophylactic hormone replacement therapy (postmenopausal)     Gastroesophageal reflux disease, esophagitis presence not specified     Onychomycosis     Past Surgical History:   Procedure Laterality Date     BIOPSY      benign breast     COLONOSCOPY  10/1/2013    Procedure: COLONOSCOPY;  Colonoscopy ;  Surgeon: Adair Stoddard MD;  Location: [facility] GI     EXCISE MASS UPPER EXTREMITY  5/20/2013    Procedure: EXCISE MASS UPPER EXTREMITY;  Remove Mass Right Upper Arm;  Surgeon: Elvira Wilson MD;  Location: [facility] OR     EYE SURGERY      lasik       Social History     Tobacco Use     Smoking status: Never Smoker     Smokeless tobacco: Never Used   Substance Use Topics     Alcohol use: No     Family History   Problem Relation Age of Onset     Cerebrovascular Disease Father         parkinsons     C.A.D. Mother      Unknown/Adopted Maternal Grandmother      Hypertension Maternal Grandfather      Hypertension Paternal Grandfather      Blood Disease Sister      Neurologic Disorder Sister         epilepsy     Arthritis Other         neck and thumb joints         Current Outpatient Medications   Medication Sig Dispense Refill     calcium citrate-vitamin D (CITRACAL) 315-200 MG-UNIT TABS Take 1 tablet by mouth daily       estradiol (ESTRACE) 0.1 MG/GM vaginal cream Place 2 g vaginally twice a  week 70 g 8     estradiol (ESTRACE) 0.5 MG tablet Take 1 tablet (0.5 mg) by mouth daily 90 tablet 3     multivitamin, therapeutic with minerals (MULTI-VITAMIN) TABS Take 1 tablet by mouth daily       progesterone (PROMETRIUM) 100 MG capsule TAKE 1 CAPSULE(100 MG) BY MOUTH DAILY 90 capsule 2     PROGESTERONE cream       Allergies   Allergen Reactions     Seasonal Allergies        Reviewed and updated as needed this visit by Provider         Review of Systems    ROS: 10 point ROS neg other than the symptoms noted above in the HPI.        Objective    /66 (BP Location: Right arm, Patient Position: Sitting, Cuff Size: Adult Regular)   Pulse 83   Temp 97.6  F (36.4  C) (Oral)   Wt 63 kg (139 lb)   SpO2 98%   BMI 22.95 kg/m    Body mass index is 22.95 kg/m .  Physical Exam   Constitutional: She is oriented to person, place, and time. She appears well-developed and well-nourished.   HENT:   Head: Normocephalic and atraumatic.   Right Ear: External ear normal.   Left Ear: External ear normal.   Nose: Nose normal.   Mouth/Throat: Oropharynx is clear and moist. No oropharyngeal exudate.   Bilateral cerumen impaction with L > R prior to ear wash. After removal of cerumen impaction with ear washes, both canals and ear drums appear normal.    Eyes: Conjunctivae and EOM are normal.   Neck: Normal range of motion. Neck supple.   Cardiovascular: Normal rate, regular rhythm and normal heart sounds.   Pulmonary/Chest: Effort normal and breath sounds normal.   Musculoskeletal: Normal range of motion.   Neurological: She is alert and oriented to person, place, and time.   Skin: Skin is warm and dry.   Psychiatric: She has a normal mood and affect. Her behavior is normal.          Diagnostic Test Results:  none         Assessment & Plan   Problem List Items Addressed This Visit     None      Visit Diagnoses     Bilateral impacted cerumen    -  Primary    Relevant Orders    HC REMOVAL IMPACTED CERUMEN IRRIGATION/LVG UNILAT  (Completed)      73 year old female with a history of cerumen impactions presenting for cerumen disimpaction bilaterally. After bilateral washout patient feeling much improved and repeat exam showing normal canals and TMs.       Patient Instructions   Consider using over the counter ear softening drops  Return to clinic if symptoms recur      Patient Education       Earwax, Home Treatment    Everyone produces earwax from the lining of the ear canal. It serves to lubricate and protect the ear. The wax that forms in the canal naturally moves toward the outside of the ear and falls out. Sometimes the ear canal may contain too much wax. This can cause a blockage and loss of hearing. Directions are given below for home treatment.  Home care  If your doctor has advised you to remove a wax blockage yourself, follow these directions:    Unless a medicine was prescribed, you may use an over-the-counter product made for clearing earwax. These contain carbamide peroxide. Lie down with the blocked ear facing upward. Apply one dropper full of medicine and wait a few minutes. Grasp the outer ear and wiggle it to help the solution enter the canal.    Lean over a sink or basin with the blocked ear facing downward. Use a bulb syringe filled with warm (not hot or cold) water to rinse the ear several times. Use gentle pressure only.    If you are having trouble draining the water out of your ear canal, put a few drops of rubbing alcohol (isopropyl alcohol) into the ear canal. This will help remove the remaining water.    Repeat this procedure once a day for up to three days, or until your hearing is back to normal. Do not use this treatment for more than three days in a row.  Don ts    Don t use cold water to rinse the ear. This will make you dizzy.    Don t perform this procedure if you have an ear infection.    Don t perform this procedure if you have a ruptured eardrum.    Don t use cotton swabs, matches, hairpins, keys, or other  objects to  clean  the ear canal. This can cause infection of the ear canal or rupture the eardrum. Because of their size and shape, cotton swabs can push earwax deeper into the ear canal instead of removing it.  Follow-up care  Follow up with your health care provider if you are not improving after three cleaning attempts, or as advised.  When to seek medical advice  Call your health care provider right away if any of these occur:    Worsening ear pain    Fever of 101 F (38.3 C) or higher, or as directed by your health care provider    Hearing does not return to normal after three days of treatment    Fluid drainage or bleeding from the ear canal    Swelling, redness, or tenderness of the outer ear    Headache, neck pain, or stiff neck    3340-9790 [employer]. [address] [place] [address], [place] [address]. All rights reserved. This information is not intended as a substitute for professional medical care. Always follow your healthcare professional's instructions.             Return if symptoms worsen or fail to improve.    Mitul Blair PA-C  [facility]

## 2019-06-27 ENCOUNTER — TELEPHONE (OUTPATIENT)
Dept: FAMILY MEDICINE | Facility: CLINIC | Age: 74
End: 2019-06-27

## 2019-06-27 DIAGNOSIS — E78.5 HYPERLIPIDEMIA LDL GOAL <160: Primary | ICD-10-CM

## 2019-06-27 NOTE — TELEPHONE ENCOUNTER
Called patient, left a message to call clinic, need to help Alix schedule a lab appointment. Ruth Behrens.

## 2019-06-27 NOTE — TELEPHONE ENCOUNTER
Patient calling to schedule a lab prior to seeing huy for 3 month f/u.  No orders at this time.  Please enter orders and call patient to schedule labs prior to meeting with huy again.

## 2019-06-27 NOTE — TELEPHONE ENCOUNTER
- please call patient back to schedule lab appointment     Patient was informed that lipid panel was placed -   Would like return call from scheduling to prevent long hold time     Thank you,   Saritha Guzman, Registered Nurse   [facility]

## 2019-07-01 DIAGNOSIS — E78.5 HYPERLIPIDEMIA LDL GOAL <160: ICD-10-CM

## 2019-07-01 LAB
CHOLEST SERPL-MCNC: 210 MG/DL
HDLC SERPL-MCNC: 75 MG/DL
LDLC SERPL CALC-MCNC: 119 MG/DL
NONHDLC SERPL-MCNC: 135 MG/DL
TRIGL SERPL-MCNC: 80 MG/DL

## 2019-07-01 PROCEDURE — 36415 COLL VENOUS BLD VENIPUNCTURE: CPT | Performed by: PHYSICIAN ASSISTANT

## 2019-07-01 PROCEDURE — 80061 LIPID PANEL: CPT | Performed by: PHYSICIAN ASSISTANT

## 2019-09-09 ENCOUNTER — OFFICE VISIT (OUTPATIENT)
Dept: FAMILY MEDICINE | Facility: CLINIC | Age: 74
End: 2019-09-09
Payer: MEDICARE

## 2019-09-09 VITALS
TEMPERATURE: 97.6 F | OXYGEN SATURATION: 97 % | WEIGHT: 136.6 LBS | DIASTOLIC BLOOD PRESSURE: 79 MMHG | BODY MASS INDEX: 22.56 KG/M2 | SYSTOLIC BLOOD PRESSURE: 121 MMHG | HEART RATE: 70 BPM

## 2019-09-09 DIAGNOSIS — H61.23 BILATERAL IMPACTED CERUMEN: ICD-10-CM

## 2019-09-09 DIAGNOSIS — K21.9 GASTROESOPHAGEAL REFLUX DISEASE WITHOUT ESOPHAGITIS: ICD-10-CM

## 2019-09-09 DIAGNOSIS — L71.9 ROSACEA: Primary | ICD-10-CM

## 2019-09-09 PROCEDURE — 99213 OFFICE O/P EST LOW 20 MIN: CPT | Mod: 25 | Performed by: PHYSICIAN ASSISTANT

## 2019-09-09 PROCEDURE — 69209 REMOVE IMPACTED EAR WAX UNI: CPT | Performed by: PHYSICIAN ASSISTANT

## 2019-09-09 NOTE — PROGRESS NOTES
Subjective     Alix Escobar is a 74 year old female who presents to clinic today for the following health issues:    HPI   Rash  Onset: One month     Description:   Location: Under right eye   Character: red  Itching (Pruritis): no     Progression of Symptoms:  same    Accompanying Signs & Symptoms:  Fever: no   Body aches or joint pain: no   Sore throat symptoms: no   Recent cold symptoms: no     History:   Previous similar rash: no     Precipitating factors:   Exposure to similar rash: no   New exposures: None   Recent travel: [place], got back one week ago     Alleviating factors:  none    Therapies Tried and outcome: none     GERD/Heartburn  Onset: Two weeks ago     Description:     Burning in chest: no     Intensity: moderate    Progression of Symptoms: improving    Accompanying Signs & Symptoms:  Does it feel like food gets stuck: no   Nausea: no   Vomiting (bloody?): no   Abdominal Pain: no   Black-Tarry stools: no:  Bloody stools: no    History:   Previous ulcers: no    Precipitating factors:   Caffeine use: YES  Alcohol use: no   NSAID/Aspirin use: YES  Tobacco use: no   Worse with spicy foods.    Alleviating factors:  Prilosec- omeprazole    Therapies Tried and outcome: Prilosec with some improvement.    Patient notes she has had plugging of the ears for 4 days. She has had ear wax cleaned out before. She has been using the softening drops.    Patient Active Problem List   Diagnosis     Hyperlipidemia LDL goal <160     Advance Care Planning     Cervicalgia     Routine general medical examination at a health care facility     Encounter for routine gynecological examination     Osteopenia     Need for prophylactic hormone replacement therapy (postmenopausal)     Gastroesophageal reflux disease, esophagitis presence not specified     Onychomycosis       Current Outpatient Medications   Medication     calcium citrate-vitamin D (CITRACAL) 315-200 MG-UNIT TABS     estradiol (ESTRACE) 0.1 MG/GM vaginal  cream     estradiol (ESTRACE) 0.5 MG tablet     multivitamin, therapeutic with minerals (MULTI-VITAMIN) TABS     progesterone (PROMETRIUM) 100 MG capsule     PROGESTERONE     No current facility-administered medications for this visit.        Allergies   Allergen Reactions     Seasonal Allergies          Reviewed and updated as needed this visit by Provider  Tobacco  Allergies  Meds  Problems         Review of Systems   GENERAL:  No fevers  GI:  As noted in HPI      Objective    /79 (BP Location: Right arm, Patient Position: Chair, Cuff Size: Adult Regular)   Pulse 70   Temp 97.6  F (36.4  C) (Oral)   Wt 62 kg (136 lb 9.6 oz)   SpO2 97%   BMI 22.56 kg/m    Body mass index is 22.56 kg/m .  Physical Exam   GENERAL: No acute distress  HEENT: Normocephalic, PERRL, Canals with moderate amount of cerumen bilaterally. Visible TM's non-erythematous and non bulging bilaterally.  SKIN: Erythematous patch with pustules and papules over the right cheek beneath the right eye.  NEURO: Alert and non-focal        Assessment & Plan     1. Rosacea  Exam is consistent with rosacea.  I discussed lifestyle changes as well as at home.  Metronidazole cream was also prescribed for today to use as needed if she would like.  - metroNIDAZOLE (METROCREAM) 0.75 % external cream; Apply topically 2 times daily  Dispense: 45 g; Refill: 1    2. Gastroesophageal reflux disease without esophagitis  I recommended patient continue the Prilosec for the full 14-day treatment.  Then she can change Zantac as needed.    3. Bilateral impacted cerumen  Cerumen removal performed today by MA.  Patient had improvement of symptoms following.  - HC REMOVAL IMPACTED CERUMEN IRRIGATION/LVG UNILAT     Return in about 1 week (around 9/16/2019) for if not improving or if worsening.    Carol Yang PA-C  [facility]

## 2019-09-09 NOTE — PATIENT INSTRUCTIONS
Patient Education     GERD (Adult)  The esophagus is a tube that carries food from the mouth to the stomach. A valve (the LES, lower esophageal sphincter) at the lower end of the esophagus prevents stomach acid from flowing upward. When this valve doesn't work properly, stomach contents may repeatedly flow back up (reflux) into the esophagus. This is called gastroesophageal reflux disease (GERD). GERD can irritate the esophagus. It can cause problems with pain, swallowing or breathing. In severe cases, GERD can cause recurrent pneumonia (from aspiration or breathing in particles) or other serious problems.  Symptoms of reflux include burning, pressure or sharp pain in the upper abdomen or mid to lower chest. The pain can spread to the neck, back, or shoulder. There may be belching, an acid taste in the back of the throat, chronic cough, or sore throat, or hoarseness. GERD symptoms often occur during the day after a big meal. They can also occur at night when lying down.   Home care  Lifestyle changes can help reduce symptoms. If needed, your healthcare provider may prescribe medicines. Symptoms often improve with treatment, but if treatment is stopped, the symptoms often return after a few months. So most persons with GERD will need to continue treatment or get treatment on and off.  Lifestyle changes    Limit or avoid fatty, fried, and spicy foods, as well as coffee, chocolate, mint, and foods with high acid content such as tomatoes and citrus fruit and juices (orange, grapefruit, lemon).    Don t eat large meals, especially at night. Frequent, smaller meals are best. Don't lie down right after eating. And don t eat anything 3 hours before going to bed.    Don't drink alcohol or smoke. As much as possible, stay away from second hand smoke.    If you are overweight, losing weight will reduce symptoms.     Don't wear tight clothing around your stomach area.    If your symptoms occur during sleep, use a foam wedge  "to elevate your upper body (not just your head.) Or, place 4\" blocks under the head of your bed. Or use 2 bed risers under your bedframe.  Medicines  If needed, medicines can help relieve the symptoms of GERD and prevent damage to the esophagus. Discuss a medicine plan with your healthcare provider. This may include one or more of the following medicines:    Antacids to help neutralize the normal acids in your stomach.    Acid blockers (Histamine or H2 blockers)- Ranitidine/Zantac to decrease acid production.    Acid inhibitors (proton pump inhibitors PPIs) to decrease acid production in a different way than the blockers. They may work better, but can take a little longer to take effect.  Take an antacid 30 to 60 minutes after eating and at bedtime, but not at the same time as an acid blocker.Try not to take medicines such as ibuprofen and aspirin. If you are taking aspirin for your heart or other medical reasons, talk to your healthcare provider about stopping it.  Follow-up care  Follow up with your healthcare provider or as advised by our staff.  When to seek medical advice  Call your healthcare provider if any of the following occur:    Stomach pain gets worse or moves to the lower right abdomen (appendix area)    Chest pain appears or gets worse, or spreads to the back, neck, shoulder, or arm    An over-the-counter trial of medicine doesn't relieve your symptoms    Weight loss that can't be explained    Trouble or pain swallowing    Frequent vomiting (can t keep down liquids)    Blood in the stool or vomit (red or black in color)    Feeling weak or dizzy    Fever of 100.4 F (38 C) or higher, or as directed by your healthcare provider  Date Last Reviewed: 3/1/2018    4780-1781 [employer]. [address] [place] [address]. All rights reserved. This information is not intended as a substitute for professional medical care. Always follow your healthcare professional's instructions.       "     Patient Education     Rosacea  Rosacea is a long-lasting (chronic) skin condition affecting the face. In the early stages it causes easy flushing or blushing. Redness may become long-term (permanent) as the small blood vessels of the face widen (dilate). There may be small, red, pus-filled bumps (pustules). It looks like a bad case of acne, and has been called adult acne. But it is not caused by the same things that cause acne.  Rosacea is a chronic illness. You will have flare-ups that come and go. This may happen every few weeks or every few months. Experts don't know what causes rosacea. If not treated, it tends to get worse over time.  Some men with a more severe form of rosacea develop a condition called rhinophyma. The oil glands on the skin of the nose become blocked and the nose gets bigger. The cheeks also become puffy. Alcohol may increase the flushing. But this condition is not caused by alcohol use.  Rosacea can be treated with topical gels and creams. Oral antibiotics are used for more severe cases. You should see improvement in the first 4 weeks. Dilated blood vessels can be treated with a small electric needle or laser surgery. Rhinophyma can be treated with surgery. Or it may be treated by surgically scraping the skin (dermabrasion).  Home care    Avoid things that make your face red or flushed. These include hot drinks, spicy foods, caffeine, and alcohol.    Exercise indoors or in a cool area to avoid getting overheated.    Avoid excess sun exposure. Use a sunscreen with at least SPF 15 or higher.    Avoid extreme hot or extreme cold weather.    Don't scrub your face. That will irritate the skin and increase redness.    Avoid harsh soaps or moisturizers with irritating ingredients. You may need to use hypoallergenic cosmetics.    Avoid over-the-counter treatments unless instructed by your healthcare provider. Some of these treatments may make rosacea worse.    Try to figure out what triggers  your flares (such as stress, sun exposure, or certain foods).  Follow-up care  Follow up with your healthcare provider, or as advised. Contact your provider if your condition is not responding to the medicines you were given. Getting treatment early can stop it from getting worse.  When to seek medical advice  Call your healthcare provider right away if you have redness, burning, or a gritty feeling in your eyes.  Date Last Reviewed: 8/1/2016 2000-2018 [employer]. [address] [place] [address]. All rights reserved. This information is not intended as a substitute for professional medical care. Always follow your healthcare professional's instructions.           Patient Education     Treating Rosacea    There is no cure for rosacea. But rosacea can be controlled in most cases, particularly with medical treatment to manage your symptoms.  Your healthcare provider may prescribe 1 or more treatments to put on your skin each day. You may also be given medicines to take by mouth if you have more severe rosacea symptoms. To relieve rosacea eye symptoms, you may need prescription eye drops. Avoid things that can easily cause your rosacea to flare up. These include spicy foods, alcohol, getting embarrassed, and going from a cold environment to a warm environment. You may have surgery to fix the more severe forms of scarring rosacea of the nose. This is called rhinophyma and means the redness and swelling that cause the nose to enlarge.  Your role in medical treatment  How well your treatment works depends partly on you. Follow your healthcare provider s treatment plan. Rosacea symptoms often get better with medicines. But they tend to get worse again if you stop taking the medicines. If your symptoms continue or get worse, ask about other treatment options, including combinations of treatments.  Rosacea self-care  Besides sticking with your treatment plan, follow these tips to care for your  skin:    Wash your face twice a day with a gentle facial cleanser. Rinse your skin well with warm (not hot) water. Pat your skin dry with a cotton towel.    Don t scrub your skin or use sponges, brushes, or other abrasive tools. Doing so can irritate your skin.    Avoid harsh scrubs or astringents. These products can irritate your skin.    If you shave your face, use an electric razor.    Apply sunscreen with at least SPF 15 or more every day. Sun exposure can make rosacea symptoms worse.    Choose skin care products and cosmetics that do not irritate the skin, and are oil-free and fragrance-free.    Avoid putting steroids on the skin sores. Steroids may make rosacea worse.   Getting good results  Learning about rosacea and treating it early is the first step toward controlling this disease. With proper treatment and self-care, you can manage your symptoms and feel better about your skin. The National Rosacea Society is a great resource for information.   What causes rosacea flare-ups?  It s often hard to pinpoint the factors that cause rosacea flare-ups. Different people can be affected by different triggers. Common triggers include weather extremes, sun exposure, alcoholic or hot beverages, spicy food, physical exertion, stress, illness, some skin products, and medicines. To prevent flare-ups, keep a list of things that seem to make your rosacea worse. Then try to avoid them.  Date Last Reviewed: 2/1/2017 2000-2018 [employer]. [address] [place] [address], [place] [address]. All rights reserved. This information is not intended as a substitute for professional medical care. Always follow your healthcare professional's instructions.

## 2020-02-17 NOTE — PROGRESS NOTES
"Pre-Visit Planning     Future Appointments   Date Time Provider Department Center   2/18/2020  7:55 AM Danielle Borrero PA-C [facility]     Arrival Time for this Appointment:  7:35 AM   Appointment Notes for this encounter:   head tremor confirmed    Questionnaires Reviewed/Assigned  No additional questionnaires are needed      Patient preferred phone number: [phone]    Spoke to patient via phone. Patient does not have additional questions or concerns.        Visit is preventive. Reviewed purpose of preventive visit with patient.    Health Maintenance Due   Topic Date Due     HEPATITIS C SCREENING  1945     ANNUAL REVIEW OF HM ORDERS  1945     ZOSTER IMMUNIZATION (2 of 3) 06/09/2011     ADVANCE CARE PLANNING  02/03/2020     MEDICARE ANNUAL WELLNESS VISIT  02/21/2020     Patient is due for:        Spar[facility]  Patient is active on [employer].    Questionnaire Review   Advised patient to arrive early in order to complete questionnaires.    Call Summary  \"Thank you for your time today.  If anything comes up before your appointment, please feel free to contact us at [phone].\"    Answers for HPI/ROS submitted by the patient on 2/8/2020   Chronic problems general questions HPI Form  How many servings of fruits and vegetables do you eat daily?: 4 or more  On average, how many sweetened beverages do you drink each day (Examples: soda, juice, sweet tea, etc.  Do NOT count diet or artificially sweetened beverages)?: 0  How many minutes a day do you exercise enough to make your heart beat faster?: 20 to 29  How many days a week do you exercise enough to make your heart beat faster?: 5  How many days per week do you miss taking your medication?: 0      Subjective     Alix Escobar is a 74 year old female who presents to clinic today for the following health issues:    HPI   Anxiety Follow-Up    How are you doing with your anxiety since your last visit? No change    Are you having other symptoms that might be " Pt states pharmacy never received rx. We did get confirmation that it was received. Refaxed now. associated with anxiety? Head tremor    Have you had a significant life event? No     Are you feeling depressed? No    Do you have any concerns with your use of alcohol or other drugs? No     Doing choral concert and gets nervous. Wondering about as needed medication. Has used propranolol in the past with good results.     Social History     Tobacco Use     Smoking status: Never Smoker     Smokeless tobacco: Never Used   Substance Use Topics     Alcohol use: No     Drug use: No     No flowsheet data found.  No flowsheet data found.    Dizziness  Onset: years    Description:   Do you feel faint:  no   Does it feel like the surroundings (bed, room) are moving: no   Unsteady/off balance: YES  Have you passed out or fallen: no     Intensity: moderate    Progression of Symptoms:  intermittent    Accompanying Signs & Symptoms:  Heart palpitations: no   Nausea, vomiting: no   Weakness in arms or legs: no   Fatigue: no   Vision or speech changes: no   Ringing in ears (Tinnitus): no   Hearing Loss: no     History:   Head trauma/concussion hx: no   Previous similar symptoms: YES  Recent bleeding history: no     Precipitating factors:   Worse with activity or head movement: no   Any new medications (BP?): no   Alcohol/drug abuse/withdrawal: no     Alleviating factors:   Does staying in a fixed position give relief:  no     Therapies Tried and outcome: has had full work up 2 years ago by [facility] [place] [facility] to r/o Parkinsons.  States she feels she is having more disequilibrium and head tremor is occurring more frequently.  Patient has strong family h/o Parkinson's and is worried about this.       Reviewed and updated as needed this visit by Provider         Review of Systems   ROS COMP: Constitutional, HEENT, cardiovascular, pulmonary, gi and gu systems are negative, except as otherwise noted.      Objective    /62 (BP Location: Right arm, Patient Position: Sitting, Cuff Size: Adult Regular)   Pulse 78   Temp 97.5  F (36.4  C)  "(Oral)   Wt 61.7 kg (136 lb)   SpO2 99%   BMI 22.46 kg/m    Body mass index is 22.46 kg/m .  Physical Exam   GENERAL APPEARANCE: healthy, alert and no distress  EYES: Eyes grossly normal to inspection, PERRL and conjunctivae and sclerae normal  HENT: ear canals and TM's normal and nose and mouth without ulcers or lesions  RESP: lungs clear to auscultation - no rales, rhonchi or wheezes  CV: regular rates and rhythm, normal S1 S2, no S3 or S4 and no murmur, click or rub  MS: extremities normal- no gross deformities noted  NEURO: Normal strength and tone, mentation intact, speech normal and Romberg abnormal -   PSYCH: mentation appears normal and affect normal/bright            Assessment & Plan     1. Benign head tremor  Risks/benefits of medication use discussed with patient. Patient reports understanding and accepts trial of medication.  Established @ Neuro, will call for appt.   - propranolol (INDERAL) 10 MG tablet; Take 1-2 tablets (10-20 mg) by mouth 2 times daily as needed (anxiety or head tremor)  Dispense: 60 tablet; Refill: 1  - NEUROLOGY ADULT REFERRAL    2. Disequilibrium  Discussed imaging, testing and options, prefers to go back to Neuro  - NEUROLOGY ADULT REFERRAL    3. Situational anxiety    - propranolol (INDERAL) 10 MG tablet; Take 1-2 tablets (10-20 mg) by mouth 2 times daily as needed (anxiety or head tremor)  Dispense: 60 tablet; Refill: 1     BMI:   Estimated body mass index is 22.46 kg/m  as calculated from the following:    Height as of 2/21/19: 1.657 m (5' 5.25\").    Weight as of this encounter: 61.7 kg (136 lb).           See Patient Instructions    No follow-ups on file.    Danielle Borrero PA-C  [facility]    "

## 2020-02-18 ENCOUNTER — OFFICE VISIT (OUTPATIENT)
Dept: FAMILY MEDICINE | Facility: CLINIC | Age: 75
End: 2020-02-18
Payer: MEDICARE

## 2020-02-18 VITALS
DIASTOLIC BLOOD PRESSURE: 62 MMHG | HEART RATE: 78 BPM | BODY MASS INDEX: 22.46 KG/M2 | WEIGHT: 136 LBS | SYSTOLIC BLOOD PRESSURE: 124 MMHG | OXYGEN SATURATION: 99 % | TEMPERATURE: 97.5 F

## 2020-02-18 DIAGNOSIS — G25.0 BENIGN HEAD TREMOR: ICD-10-CM

## 2020-02-18 DIAGNOSIS — F41.8 SITUATIONAL ANXIETY: ICD-10-CM

## 2020-02-18 DIAGNOSIS — G25.0 BENIGN HEAD TREMOR: Primary | ICD-10-CM

## 2020-02-18 DIAGNOSIS — R42 DISEQUILIBRIUM: ICD-10-CM

## 2020-02-18 PROCEDURE — 99214 OFFICE O/P EST MOD 30 MIN: CPT | Performed by: PHYSICIAN ASSISTANT

## 2020-02-18 RX ORDER — PROPRANOLOL HYDROCHLORIDE 10 MG/1
10-20 TABLET ORAL 2 TIMES DAILY PRN
Qty: 60 TABLET | Refills: 1 | Status: SHIPPED | OUTPATIENT
Start: 2020-02-18 | End: 2020-11-02

## 2020-02-18 NOTE — PATIENT INSTRUCTIONS
Miralax--use as needed for constipation or you may use magnesium   Biofreze as needed for neck issues.

## 2020-02-19 RX ORDER — PROPRANOLOL HYDROCHLORIDE 10 MG/1
TABLET ORAL
Qty: 360 TABLET | OUTPATIENT
Start: 2020-02-19

## 2020-02-19 NOTE — TELEPHONE ENCOUNTER
"Medication was filled yesterday at   [facility] - Registered Nurse  [facility]  Acute and Diagnostic Services            Requested Prescriptions   Pending Prescriptions Disp Refills     PROPRANOLOL 10 MG PO tablet [Pharmacy Med Name: PROPRANOLOL 10MG TABLETS] 360 tablet      Sig: TAKE 1-2 TABLETS BY MOUTH TWICE DAILY AS NEEDED FOR ANXIETY OR HEAD TREMOR       Beta-Blockers Protocol Passed - 2/18/2020  8:39 AM        Passed - Blood pressure under 140/90 in past 12 months     BP Readings from Last 3 Encounters:   02/18/20 124/62   09/09/19 121/79   06/14/19 114/66                 Passed - Patient is age 6 or older        Passed - Recent (12 mo) or future (30 days) visit within the authorizing provider's specialty     Patient has had an office visit with the authorizing provider or a provider within the authorizing providers department within the previous 12 mos or has a future within next 30 days. See \"Patient Info\" tab in inbasket, or \"Choose Columns\" in Meds & Orders section of the refill encounter.              Passed - Medication is active on med list          "

## 2020-02-28 ENCOUNTER — TRANSFERRED RECORDS (OUTPATIENT)
Dept: HEALTH INFORMATION MANAGEMENT | Facility: CLINIC | Age: 75
End: 2020-02-28

## 2020-03-24 DIAGNOSIS — Z79.890 NEED FOR PROPHYLACTIC HORMONE REPLACEMENT THERAPY (POSTMENOPAUSAL): ICD-10-CM

## 2020-03-24 DIAGNOSIS — N95.1 HOT FLUSHES, PERIMENOPAUSAL: ICD-10-CM

## 2020-03-24 RX ORDER — ESTRADIOL 0.5 MG/1
0.5 TABLET ORAL DAILY
Qty: 90 TABLET | Refills: 0 | Status: SHIPPED | OUTPATIENT
Start: 2020-03-24 | End: 2020-06-17

## 2020-03-24 NOTE — TELEPHONE ENCOUNTER
Progesterone micro   Last Written Prescription Date:  5/20/19  Last Fill Quantity: 90,   # refills: 2  Last Office Visit: 2/21/19  Future Office visit:       Estradiol 0.5 mg tablets      Last Written Prescription Date:  2/21/20  Last Fill Quantity: 90,   # refills: 3  Last Office Visit: 2/21/19  Future Office visit:

## 2020-06-17 DIAGNOSIS — N95.1 HOT FLUSHES, PERIMENOPAUSAL: ICD-10-CM

## 2020-06-17 DIAGNOSIS — Z79.890 NEED FOR PROPHYLACTIC HORMONE REPLACEMENT THERAPY (POSTMENOPAUSAL): ICD-10-CM

## 2020-06-17 RX ORDER — ESTRADIOL 0.5 MG/1
0.5 TABLET ORAL DAILY
Qty: 90 TABLET | Refills: 0 | Status: SHIPPED | OUTPATIENT
Start: 2020-06-17 | End: 2020-07-21

## 2020-06-17 NOTE — TELEPHONE ENCOUNTER
estradiol      Last Written Prescription Date:  3/24/20  Last Fill Quantity: 90,   # refills: 0  Last Office Visit: 2/21/19  Future Office visit:       progesterone      Last Written Prescription Date:  3/24/20  Last Fill Quantity: 90,   # refills: 0  Last Office Visit: 2/21/19  Future Office visit:

## 2020-07-21 ENCOUNTER — OFFICE VISIT (OUTPATIENT)
Dept: OBGYN | Facility: CLINIC | Age: 75
End: 2020-07-21
Payer: MEDICARE

## 2020-07-21 VITALS — WEIGHT: 139 LBS | SYSTOLIC BLOOD PRESSURE: 128 MMHG | BODY MASS INDEX: 22.95 KG/M2 | DIASTOLIC BLOOD PRESSURE: 76 MMHG

## 2020-07-21 DIAGNOSIS — Z01.419 ENCOUNTER FOR GYNECOLOGICAL EXAMINATION WITHOUT ABNORMAL FINDING: ICD-10-CM

## 2020-07-21 DIAGNOSIS — Z13.6 CARDIOVASCULAR SCREENING; LDL GOAL LESS THAN 100: ICD-10-CM

## 2020-07-21 DIAGNOSIS — Z00.00 ENCOUNTER FOR MEDICARE ANNUAL WELLNESS EXAM: ICD-10-CM

## 2020-07-21 DIAGNOSIS — N95.1 HOT FLUSHES, PERIMENOPAUSAL: ICD-10-CM

## 2020-07-21 DIAGNOSIS — Z12.31 ENCOUNTER FOR SCREENING MAMMOGRAM FOR BREAST CANCER: Primary | ICD-10-CM

## 2020-07-21 DIAGNOSIS — Z79.890 NEED FOR PROPHYLACTIC HORMONE REPLACEMENT THERAPY (POSTMENOPAUSAL): ICD-10-CM

## 2020-07-21 PROCEDURE — 99397 PER PM REEVAL EST PAT 65+ YR: CPT | Mod: GZ | Performed by: OBSTETRICS & GYNECOLOGY

## 2020-07-21 PROCEDURE — G0101 CA SCREEN;PELVIC/BREAST EXAM: HCPCS | Performed by: OBSTETRICS & GYNECOLOGY

## 2020-07-21 RX ORDER — ESTRADIOL 0.1 MG/G
2 CREAM VAGINAL
Qty: 70 G | Refills: 8 | Status: SHIPPED | OUTPATIENT
Start: 2020-07-23 | End: 2022-04-29

## 2020-07-21 RX ORDER — ESTRADIOL 0.5 MG/1
0.5 TABLET ORAL DAILY
Qty: 90 TABLET | Refills: 0 | Status: SHIPPED | OUTPATIENT
Start: 2020-07-21 | End: 2020-10-23

## 2020-07-21 NOTE — PATIENT INSTRUCTIONS
You can reach your [facility] Team any time of the day by calling [phone]. This number will put you in touch with the 24 hour nurse line if the clinic is closed.    To contact your OB/GYN Station Coordinator/Surgery Scheduler please call [phone]. This is a direct number for your care team between 8 a.m. and 4 p.m. Monday through Friday.    [facility] is open for your convenience:  Monday through Friday 8 a.m. to 6 p.m.  Closed weekends and all major holidays.    Patient Education   Personalized Prevention Plan  You are due for the preventive services outlined below.  Your care team is available to assist you in scheduling these services.  If you have already completed any of these items, please share that information with your care team to update in your medical record.  Health Maintenance Due   Topic Date Due     Hepatitis C Screening  1945     Zoster (Shingles) Vaccine (2 of 3) 06/09/2011     Discuss Advance Care Planning  02/03/2020     Annual Wellness Visit  02/21/2020     Cholesterol Lab  07/01/2020

## 2020-07-21 NOTE — NURSING NOTE
"Chief Complaint   Patient presents with     Physical       Initial /76   Wt 63 kg (139 lb)   BMI 22.95 kg/m   Estimated body mass index is 22.95 kg/m  as calculated from the following:    Height as of 19: 1.657 m (5' 5.25\").    Weight as of this encounter: 63 kg (139 lb).  BP completed using cuff size: regular    Questioned patient about current smoking habits.  Pt. has never smoked.          The following HM Due: NONE      The following patient reported/Care Every where data was sent to:  P ABSTRACT QUALITY INITIATIVES [[id number]]        Margarita Owen CMA                 "

## 2020-07-22 NOTE — PROGRESS NOTES
HPI:  Alix Escobar is a 75 year old white female  ,menopause and not sexually active for contraception who presents for an annual exam and pap.  She is otherwise doing well.  The patient is on hormone replacement therapy as noted and desires to remain on this.  The patient states that she feels much better on it.  The risk benefits alternatives and recommendations were thoroughly reviewed.  I think the patient is a good understanding.  All of her questions have been answered and informed consent is been obtained she has not had any vaginal bleeding  Self breast exam,  ACS screening mammogram recs, the use of 81 mg ASA to decrease the risk of heart disease, lipid screening, colon cancer screening recs and Dexa scan recs thoroughly reveiwed.      Past Medical History:   Diagnosis Date     Advanced directives, counseling/discussion 2011     Cervicalgia 2011     Dysphagia      Hyperlipidemia LDL goal <130 4/15/2011     Hyperlipidemia LDL goal <160 4/15/2011    High HDL     Osteoarthritides      Osteopenia 2011     Routine general medical examination at a health care facility 2011     Routine gynecological examination 2011     Umbilical hernia      Past Surgical History:   Procedure Laterality Date     BIOPSY      benign breast     COLONOSCOPY  10/1/2013    Procedure: COLONOSCOPY;  Colonoscopy ;  Surgeon: Adair Stoddard MD;  Location: [facility]     EXCISE MASS UPPER EXTREMITY  2013    Procedure: EXCISE MASS UPPER EXTREMITY;  Remove Mass Right Upper Arm;  Surgeon: Elvira Wilson MD;  Location: [facility]     EYE SURGERY      lasik     wisdom teeth       Family History   Problem Relation Age of Onset     Cerebrovascular Disease Father         parkinsons     C.A.D. Mother      Unknown/Adopted Maternal Grandmother      Hypertension Maternal Grandfather      Hypertension Paternal Grandfather      Blood Disease Sister      Neurologic Disorder Sister         epilepsy     Arthritis  Other         neck and thumb joints     Social History     Socioeconomic History     Marital status:      Spouse name: Not on file     Number of children: Not on file     Years of education: Not on file     Highest education level: Not on file   Occupational History     Not on file   Social Needs     Financial resource strain: Not on file     Food insecurity     Worry: Not on file     Inability: Not on file     Transportation needs     Medical: Not on file     Non-medical: Not on file   Tobacco Use     Smoking status: Never Smoker     Smokeless tobacco: Never Used   Substance and Sexual Activity     Alcohol use: No     Drug use: No     Sexual activity: Never     Partners: Male   Lifestyle     Physical activity     Days per week: Not on file     Minutes per session: Not on file     Stress: Not on file   Relationships     Social connections     Talks on phone: Not on file     Gets together: Not on file     Attends Hindu service: Not on file     Active member of club or organization: Not on file     Attends meetings of clubs or organizations: Not on file     Relationship status: Not on file     Intimate partner violence     Fear of current or ex partner: Not on file     Emotionally abused: Not on file     Physically abused: Not on file     Forced sexual activity: Not on file   Other Topics Concern     Parent/sibling w/ CABG, MI or angioplasty before 65F 55M? No   Social History Narrative     Not on file       Allergies:  Seasonal allergies    Current Outpatient Medications   Medication Sig Dispense Refill     calcium citrate-vitamin D (CITRACAL) 315-200 MG-UNIT TABS Take 1 tablet by mouth daily       [START ON 7/23/2020] estradiol (ESTRACE) 0.1 MG/GM vaginal cream Place 2 g vaginally twice a week 70 g 8     estradiol (ESTRACE) 0.5 MG tablet Take 1 tablet (0.5 mg) by mouth daily 90 tablet 0     multivitamin, therapeutic with minerals (MULTI-VITAMIN) TABS Take 1 tablet by mouth daily       progesterone  (PROMETRIUM) 100 MG capsule TAKE 1 CAPSULE(100 MG) BY MOUTH DAILY 90 capsule 0     propranolol (INDERAL) 10 MG tablet Take 1-2 tablets (10-20 mg) by mouth 2 times daily as needed (anxiety or head tremor) (Patient not taking: Reported on 7/21/2020) 60 tablet 1       ROS: ROS: 10 point ROS neg other than the symptoms noted above in the HPI.    EXAM:  Vitals: /76   Wt 63 kg (139 lb)   BMI 22.95 kg/m    BMI= Body mass index is 22.95 kg/m .  Constitutional: healthy, alert and no distress  Head: Normocephalic. No masses, lesions, tenderness or abnormalities  Neck: Neck supple. No adenopathy. Thyroid symmetric, normal size,, Carotids without bruits.  ENT: NEGATIVE for ear, mouth and throat problems  Breast:  breasts symmetric, no dominant or suspicious mass, no skin or nipple changes, no axillary adenopathy, unchanged from previous exam or self exam in taught and encouraged  Cardiovascular: negative, PMI normal. No lifts, heaves, or thrills. RRR. No murmurs, clicks gallops or rub  Respiratory: negative, Percussion normal. Good diaphragmatic excursion. Lungs clear  Gastrointestinal: Abdomen soft, non-tender. BS normal. No masses, organomegaly  Genitourinary: Pelvic Exam:  External Genitalia:     Normal appearance for age, no discharge present, no tenderness present, no inflammatory lesions present, color normal  Vagina:     Normal vaginal vault without central or paravaginal defects, ATROPHIC  Bladder:     Nontender to palpation  Urethra:   Urethral Body:  Urethra palpation normal, urethra structural support normal   Urethral Meatus:  No erythema or lesions present  Cervix:     Appearance healthy, no lesions present, nontender to palpation, no bleeding present  Uterus:     Nontender to palpation, no masses present, position anteflexed, mobility: normal  Adnexa:     No adnexal tenderness present, no adnexal masses present  Perineum:     Perineum within normal limits, no evidence of trauma, no rashes or skin lesions  present  Inguinal Lymph Nodes:     No lymphadenopathy present    Musculoskeletalextremities normal- no gross deformities noted, gait normal and normal muscle tone  Integument: no suspicious lesions or rashes  Neurologic: Gait normal. Reflexes normal and symmetric. Sensation grossly WNL.  Psychiatric: mentation appears normal and affect normal/bright  Hematologic/Lymphatic/Immunologic: Normal cervical lymph nodes     ASSESSMENT:/PLAN:  (Z12.31) Encounter for screening mammogram for breast cancer  (primary encounter diagnosis)  Comment: Recommendations reviewed  Plan: MA Screen Bilateral w/Uche        Order.  Previous values reviewed    (Z01.419) Encounter for gynecological examination without abnormal finding  Comment: Other than above concerns otherwise normal exam  Plan: Return 1 year or as needed    (Z79.890) Need for prophylactic hormone replacement therapy (postmenopausal)  Comment: Refills given at patient request  Plan: estradiol (ESTRACE) 0.1 MG/GM vaginal cream,         estradiol (ESTRACE) 0.5 MG tablet        Ordered    (N95.1) Hot flushes, perimenopausal  Comment: Patient is symptomatic with desires to remain on HRT  Plan: estradiol (ESTRACE) 0.1 MG/GM vaginal cream,         estradiol (ESTRACE) 0.5 MG tablet, progesterone        (PROMETRIUM) 100 MG capsule        Refill done    (Z13.6) CARDIOVASCULAR SCREENING; LDL GOAL LESS THAN 100  Comment: Recommendations and previous values reviewed  Plan: Lipid panel reflex to direct LDL Fasting        Future order placed    (Z00.00) Encounter for Medicare annual wellness exam  Comment: Otherwise unremarkable exam pertinent findings for Medicare annual wellness exam covered through  Plan:  1 year or as needed concerns      Juan Jose Levy M.D.

## 2020-07-28 DIAGNOSIS — Z13.6 CARDIOVASCULAR SCREENING; LDL GOAL LESS THAN 100: ICD-10-CM

## 2020-07-28 LAB
CHOLEST SERPL-MCNC: 219 MG/DL
HDLC SERPL-MCNC: 78 MG/DL
LDLC SERPL CALC-MCNC: 125 MG/DL
NONHDLC SERPL-MCNC: 141 MG/DL
TRIGL SERPL-MCNC: 81 MG/DL

## 2020-07-28 PROCEDURE — 36415 COLL VENOUS BLD VENIPUNCTURE: CPT | Performed by: OBSTETRICS & GYNECOLOGY

## 2020-07-28 PROCEDURE — 80061 LIPID PANEL: CPT | Performed by: OBSTETRICS & GYNECOLOGY

## 2020-07-29 ENCOUNTER — TELEPHONE (OUTPATIENT)
Dept: OBGYN | Facility: CLINIC | Age: 75
End: 2020-07-29

## 2020-07-29 NOTE — TELEPHONE ENCOUNTER
Please advise the patient of the mildly elevated lipid panel.  Her total cholesterol is 219 in a perfect world we like it less than 200.  Her triglyceride level is normal at 81, anything less than 150 is normal.  Her HDL is nice and high at 78 and her LDL is 125.  She is otherwise a thin active healthy woman who does not smoke and is not diabetic.  At this point I recommend following a low-fat low-cholesterol diet with exercise and repeating a fasting lipid panel in a year.  Please asked her to call if she has any additional questions

## 2020-08-06 ENCOUNTER — AMBULATORY - HEALTHEAST (OUTPATIENT)
Dept: FAMILY MEDICINE | Facility: CLINIC | Age: 75
End: 2020-08-06

## 2020-08-06 ENCOUNTER — VIRTUAL VISIT (OUTPATIENT)
Dept: FAMILY MEDICINE | Facility: OTHER | Age: 75
End: 2020-08-06

## 2020-08-06 DIAGNOSIS — Z20.822 SUSPECTED COVID-19 VIRUS INFECTION: ICD-10-CM

## 2020-08-06 NOTE — PROGRESS NOTES
"Date: 2020 12:04:50  Clinician: Arleen Maloney  Clinician NPI: [id number]  Patient: Alix Escobar  Patient : 1945  Patient Address: [address], [place] [address]  Patient Phone: [phone]  Visit Protocol: URI  Patient Summary:  Alix is a 75 year old ( : 1945 ) female who initiated a Visit for COVID-19 (Coronavirus) evaluation and screening. When asked the question \"Please sign me up to receive news, health information and promotions. \", Alix responded \"No\".    Alix states her symptoms started gradually 7-9 days ago.   Her symptoms consist of a headache, nasal congestion, facial pain or pressure, and malaise. She is experiencing mild difficulty breathing with activities but can speak normally in full sentences.   Symptom details     Nasal secretions: The color of her mucus is clear.    Facial pain or pressure: The facial pain or pressure does not feel worse when bending or leaning forward.     Headache: She states the headache is mild (1-3 on a 10 point pain scale).      Ailx denies having ear pain, chills, enlarged lymph nodes, nausea, sore throat, teeth pain, ageusia, diarrhea, cough, vomiting, rhinitis, myalgias, anosmia, fever, and wheezing. She also denies taking antibiotic medication in the past month, double sickening (worsening symptoms after initial improvement), and having recent facial or sinus surgery in the past 60 days.    Pertinent COVID-19 (Coronavirus) information  In the past 14 days, Alix has not worked in a congregate living setting.   She does not work or volunteer as healthcare worker or a  and does not work or volunteer in a healthcare facility.   Alix also has not lived in a congregate living setting in the past 14 days. She does not live with a healthcare worker.   Alix has not had a close contact with a laboratory-confirmed COVID-19 patient within 14 days of symptom onset.   Since 2019, Alix and has not had upper " respiratory infection or influenza-like illness. Has not been diagnosed with lab-confirmed COVID-19 test   Pertinent medical history  Alix does not get yeast infections when she takes antibiotics.   Alix does not need a return to work/school note.   Weight: 136 lbs   Alix does not smoke or use smokeless tobacco.   Additional information as reported by the patient (free text): Being short of breath really got my attention.  I am not a smoker, and I exercise regularly, walking and lifting weights.   Weight: 136 lbs    MEDICATIONS: estradiol oral, progesterone micronized oral, estradiol transdermal, ALLERGIES: NKDA  Clinician Response:  Dear Alix,  Based on the information provided, you have viral sinusitis, also known as a sinus infection. Sinus infections are caused by bacteria or a virus and symptoms are almost always identical. The difference between the 2 types of infections is timing.  Sinus infections start as viral infections and symptoms improve on their own in about 7 days. If symptoms have not improved after 7 days or have even worsened, a bacterial infection may have developed.  Medication information  Because you have a viral infection, antibiotics will not help you get better. Treating a viral infection with antibiotics could actually make you feel worse.  I am prescribing:     Fluticasone 50 mcg/actuation nasal spray. Inhale 2 sprays in each nostril 1 time per day; after 1 week, may adjust to 1 - 2 sprays in each nostril 1 time per day. This medication takes several days to start working, so keep taking it even if it doesn't help right away. There are no refills with this prescription.   Unless you are allergic to the over-the-counter medication(s) below, I recommend using:     Acetaminophen (Tylenol or store brand) oral tablet. Take 1-2 tablets by mouth every 4-6 hours to help with the discomfort.   Over-the-counter medications do not require a prescription. Ask the pharmacist if you have any  questions.  Self care  Steps you can take to be as comfortable as possible:     Rest.    Drink plenty of fluids.    Take a warm shower to loosen congestion    Use a cool-mist humidifier.     When to seek care  Please be seen in a clinic or urgent care if any of the following occur:   New symptoms develop, or symptoms become worse   Call ahead before going to the clinic or urgent care.  Additional treatment plan   Your symptoms show that you may have coronavirus (COVID-19). This illness can cause fever, cough and trouble breathing. Many people get a mild case and get better on their own. Some people can get very sick.  Based on the symptoms you have shared, I would like you to be re-checked in 2 to 3 days. Please call your family clinic to set up a video or phone visit.  Will I be tested for COVID-19?  We would like to test you for this virus.   Please call [phone] to schedule your visit. Explain that you were referred by [facility] to have a COVID-19 test. [facility].   The following will serve as your written order for this COVID Test, ordered by me, for the indication of suspected COVID [Z20.828]: The test will be ordered in [facility], our electronic health record, after you are scheduled. It will show as ordered and authorized by Lasha Mcgee MD.  Order: COVID-19 (Coronavirus) PCR for SYMPTOMATIC testing from [facility].  1.When it's time for your COVID test:   Stay at least 6 feet away from others. (If someone will drive you to your test, stay in the backseat, as far away from the  as you can.)   Cover your mouth and nose with a mask, tissue or washcloth.  Go straight to the testing site. Don't make any stops on the way there or back.      2.Starting now: Stay home and away from others (self-isolate) until:   You've had no fever---and no medicine that reduces fever---for one full day (24 hours). And...   Your other symptoms have gotten better. For example, your cough or breathing  "has improved. And...   At least 10 days have passed since your symptoms started.       During this time, don't leave the house except for testing or medical care.   Stay in your own room, even for meals. Use your own bathroom if you can.   Stay away from others in your home. No hugging, kissing or shaking hands. No visitors.  Don't go to work, school or anywhere else.    Clean \"high touch\" surfaces often (doorknobs, counters, handles, etc.). Use a household cleaning spray or wipes. You'll find a full list of  on the EPA website: www.epa.gov/pesticide-registration/list-n-disinfectants-use-against-sars-cov-2.   Cover your mouth and nose with a mask, tissue or washcloth to avoid spreading germs.  Wash your hands and face often. Use soap and water.  Caregivers in these groups are at risk for severe illness due to COVID-19:  o People 65 years and older  o People who live in a nursing home or long-term care facility  o People with chronic disease (lung, heart, cancer, diabetes, kidney, liver, immunologic)   o People who have a weakened immune system, including those who:   Are in cancer treatment  Take medicine that weakens the immune system, such as corticosteroids  Had a bone marrow or organ transplant  Have an immune deficiency  Have poorly controlled HIV or AIDS  Are obese (body mass index of 40 or higher)  Smoke regularly   o Caregivers should wear gloves while washing dishes, handling laundry and cleaning bedrooms and bathrooms.  o Use caution when washing and drying laundry: Don't shake dirty laundry, and use the warmest water setting that you can.  o For more tips, go to www.cdc.gov/coronavirus/2019-ncov/downloads/10Things.pdf.      How can I take care of myself?   Get lots of rest. Drink extra fluids (unless a doctor has told you not to)   Take Tylenol (acetaminophen) for fever or pain. If you have liver or kidney problems, ask your family doctor if it's okay to take Tylenol.   Adults can take either:    " 650 mg (two 325 mg pills) every 4 to 6 hours, or...   1,000 mg (two 500 mg pills) every 8 hours as needed.    Note: Don't take more than 3,000 mg in one day. Acetaminophen is found in many medicines (both prescribed and over-the-counter medicines). Read all labels to be sure you don't take too much.   For children, check the Tylenol bottle for the right dose. The dose is based on the child's age or weight.    If you have other health problems (like cancer, heart failure, an organ transplant or severe kidney disease): Call your specialty clinic if you don't feel better in the next 2 days.       Know when to call 911. Emergency warning signs include:    Trouble breathing or shortness of breath Pain or pressure in the chest that doesn't go away Feeling confused like you haven't felt before, or not being able to wake up Bluish-colored lips or face  Where can I get more information?   [facility] -- About COVID-19: [email or web address]   CDC -- What to Do If You're Sick: www.cdc.gov/coronavirus/2019-ncov/about/steps-when-sick.html   CDC -- Ending Home Isolation: www.cdc.gov/coronavirus/2019-ncov/hcp/disposition-in-home-patients.html   CDC -- Caring for Someone: www.cdc.gov/coronavirus/2019-ncov/if-you-are-sick/care-for-someone.html   [facility] -- Interim Guidance for Hospital Discharge to Home: www.health.[facility].[place].us/diseases/coronavirus/hcp/hospdischarge.pdf   [facility] clinical trials (COVID-19 research studies): clinicalaffairs.[facility]-clinical-trials    Below are the COVID-19 hotlines at the [facility] of Health ([facility]). Interpreters are available.    For health questions: Call 474-292-7931 or 1-315.267.4997 (7 a.m. to 7 p.m.) For questions about schools and childcare: Call 428-697-1340 or 1-548.141.9215 (7 a.m. to 7 p.m.)       Diagnosis: Nasal congestion  Diagnosis ICD: R09.81  Prescription: fluticasone 50 mcg/actuation nasal spray,suspension 1 120 spray aerosol with adapter  (grams), 30 days supply. Inhale 2 sprays in each nostril 1 time per day; after 1 week, may adjust to 1 - 2 sprays in each nostril 1 time per day.. Refills: 0, Refill as needed: no, Allow substitutions: yes

## 2020-08-07 ENCOUNTER — AMBULATORY - HEALTHEAST (OUTPATIENT)
Dept: FAMILY MEDICINE | Facility: CLINIC | Age: 75
End: 2020-08-07

## 2020-08-07 DIAGNOSIS — Z20.822 SUSPECTED COVID-19 VIRUS INFECTION: ICD-10-CM

## 2020-08-08 ENCOUNTER — NURSE TRIAGE (OUTPATIENT)
Dept: NURSING | Facility: CLINIC | Age: 75
End: 2020-08-08

## 2020-08-08 NOTE — TELEPHONE ENCOUNTER
"\"I did [facility] because I was having some shortness of breath, I was worried about covid. I was also tested yesterday 8/7. I was advised to make a follow up appt for Monday. I was also told that someone would contact me via e-mail on how to set up for a video conference on my end. I checked my email and spam, I didn't receive anything.I don't even know why it needs to be a video?\"  Patient denies sx today or triage.   Transferred to [facility] for help with her appt on Monday 8/10.   Call back if needed.  Sybil Salguero RN [facility]    COVID 19 Nurse Triage Plan/Patient Instructions    Please be aware that novel coronavirus (COVID-19) may be circulating in the community. If you develop symptoms such as fever, cough, or SOB or if you have concerns about the presence of another infection including coronavirus (COVID-19), please contact your health care provider or visit [email or web address].     Disposition/Instructions    Home care recommended. Follow home care protocol based instructions.    Thank you for taking steps to prevent the spread of this virus.  o Limit your contact with others.  o Wear a simple mask to cover your cough.  o Wash your hands well and often.    Resources    [facility]: About COVID-19: [email or web address]    CDC: What to Do If You're Sick: www.cdc.gov/coronavirus/2019-ncov/about/steps-when-sick.html    CDC: Ending Home Isolation: www.cdc.gov/coronavirus/2019-ncov/hcp/disposition-in-home-patients.html     CDC: Caring for Someone: www.cdc.gov/coronavirus/2019-ncov/if-you-are-sick/care-for-someone.html     [facility]: Interim Guidance for Hospital Discharge to Home: www.health.[facility].[place].us/diseases/coronavirus/hcp/hospdischarge.pdf    [facility] clinical trials (COVID-19 research studies): clinicalaffairs.[facility]/umn-clinical-trials     Below are the COVID-19 hotlines at the [place] [facility] ([facility]). Interpreters are available.   o For health " questions: Call 615-321-6395 or 1-628.253.3333 (7 a.m. to 7 p.m.)  o For questions about schools and childcare: Call 876-095-3045 or 1-274.723.8985 (7 a.m. to 7 p.m.)

## 2020-08-09 ENCOUNTER — COMMUNICATION - HEALTHEAST (OUTPATIENT)
Dept: SCHEDULING | Facility: CLINIC | Age: 75
End: 2020-08-09

## 2020-08-10 ENCOUNTER — VIRTUAL VISIT (OUTPATIENT)
Dept: FAMILY MEDICINE | Facility: CLINIC | Age: 75
End: 2020-08-10
Payer: MEDICARE

## 2020-08-10 DIAGNOSIS — Z20.822 SUSPECTED COVID-19 VIRUS INFECTION: Primary | ICD-10-CM

## 2020-08-10 PROCEDURE — 99441 ZZC PHYSICIAN TELEPHONE EVALUATION 5-10 MIN: CPT | Performed by: FAMILY MEDICINE

## 2020-08-10 NOTE — PROGRESS NOTES
"Alix Escobar is a 75 year old female who is being evaluated via a billable telephone visit.      The patient has been notified of following:     \"This telephone visit will be conducted via a call between you and your physician/provider. We have found that certain health care needs can be provided without the need for a physical exam.  This service lets us provide the care you need with a short phone conversation.  If a prescription is necessary we can send it directly to your pharmacy.  If lab work is needed we can place an order for that and you can then stop by our lab to have the test done at a later time.    Telephone visits are billed at different rates depending on your insurance coverage. During this emergency period, for some insurers they may be billed the same as an in-person visit.  Please reach out to your insurance provider with any questions.    If during the course of the call the physician/provider feels a telephone visit is not appropriate, you will not be charged for this service.\"    Patient has given verbal consent for Telephone visit?  Yes    What phone number would you like to be contacted at? [phone]    How would you like to obtain your AVS? Karmen Stevenson     Alix Escobar is a 75 year old female who presents via phone visit today for the following health issues:    HPI  Answers for HPI/ROS submitted by the patient on 8/10/2020   Chronic problems general questions HPI Form  How many servings of fruits and vegetables do you eat daily?: 4 or more  On average, how many sweetened beverages do you drink each day (Examples: soda, juice, sweet tea, etc.  Do NOT count diet or artificially sweetened beverages)?: 0  How many minutes a day do you exercise enough to make your heart beat faster?: 30 to 60  How many days a week do you exercise enough to make your heart beat faster?: 5  How many days per week do you miss taking your medication?: 0        Concern for COVID-19  About " how many days ago did these symptoms start? 8/4/2020  Is this your first visit for this illness? No   How would you describe your symptoms since your last visit? My symptoms have resolved   How many days has it been since your symptoms have resolved? 8/7/2020  In the 14 days before your symptoms started, have you had close contact with someone with COVID-19 (Coronavirus)? No  Do you have a fever or chills? No  Are you having new or worsening difficulty breathing? No  Do you have new or worsening cough? No  Have you had any new or unexplained body aches? No    Have you experienced any of the following NEW symptoms?    Headache: No    Sore throat: No    Loss of taste or smell: No    Chest pain: No    Diarrhea: No    Rash: No  What treatments have you tried? Allergy medication   Who do you live with?   Are you, or a household member, a healthcare worker or a ? No  Do you live in a nursing home, group home, or shelter? No  Do you have a way to get food/medications if quarantined? NO          Patient reports all of her symptoms have resolved and she is feeling like herself again.      Patient Active Problem List   Diagnosis     Hyperlipidemia LDL goal <160     Advance Care Planning     Cervicalgia     Routine general medical examination at a health care facility     Encounter for routine gynecological examination     Osteopenia     Need for prophylactic hormone replacement therapy (postmenopausal)     Gastroesophageal reflux disease, esophagitis presence not specified     Onychomycosis     Past Surgical History:   Procedure Laterality Date     BIOPSY      benign breast     COLONOSCOPY  10/1/2013    Procedure: COLONOSCOPY;  Colonoscopy ;  Surgeon: Adair Stoddard MD;  Location:  GI     EXCISE MASS UPPER EXTREMITY  5/20/2013    Procedure: EXCISE MASS UPPER EXTREMITY;  Remove Mass Right Upper Arm;  Surgeon: Elvira Wilson MD;  Location:  OR     EYE SURGERY      lasik     wisdom teeth          Social History     Tobacco Use     Smoking status: Never Smoker     Smokeless tobacco: Never Used   Substance Use Topics     Alcohol use: No     Family History   Problem Relation Age of Onset     Cerebrovascular Disease Father         parkinsons     C.A.D. Mother      Unknown/Adopted Maternal Grandmother      Hypertension Maternal Grandfather      Hypertension Paternal Grandfather      Blood Disease Sister      Neurologic Disorder Sister         epilepsy     Arthritis Other         neck and thumb joints           Reviewed and updated as needed this visit by Provider         Review of Systems   Constitutional, HEENT, cardiovascular, pulmonary, GI, , musculoskeletal, neuro, skin, endocrine and psych systems are negative, except as otherwise noted.       Objective   Reported vitals:  There were no vitals taken for this visit.   healthy, alert and no distress  PSYCH: Alert and oriented times 3; coherent speech, normal   rate and volume, able to articulate logical thoughts, able   to abstract reason, no tangential thoughts, no hallucinations   or delusions  Her affect is normal  RESP: No cough, no audible wheezing, able to talk in full sentences  Remainder of exam unable to be completed due to telephone visits    Diagnostic Test Results:  Labs reviewed in Epic  none         Assessment/Plan:    1. Suspected COVID-19 virus infection  - PCR testing negative. Patient does understand there can be some false negatives however. She is currently symptom free. Supportive care discussed. She can return to her normal activities after 72 hrs of beign symptom free.         Return if symptoms worsen or fail to improve.      Phone call duration:  5 minutes    Jessica Small MD

## 2020-10-02 ENCOUNTER — ANCILLARY PROCEDURE (OUTPATIENT)
Dept: MAMMOGRAPHY | Facility: CLINIC | Age: 75
End: 2020-10-02
Payer: MEDICARE

## 2020-10-02 DIAGNOSIS — Z12.31 ENCOUNTER FOR SCREENING MAMMOGRAM FOR BREAST CANCER: ICD-10-CM

## 2020-10-02 PROCEDURE — 77063 BREAST TOMOSYNTHESIS BI: CPT | Performed by: RADIOLOGY

## 2020-10-02 PROCEDURE — 77067 SCR MAMMO BI INCL CAD: CPT | Mod: TC | Performed by: RADIOLOGY

## 2020-10-05 NOTE — RESULT ENCOUNTER NOTE
Please contact the patient to ensure the additional views are obtained at the radiologist as requested  Thank you

## 2020-10-12 DIAGNOSIS — N95.1 HOT FLUSHES, PERIMENOPAUSAL: ICD-10-CM

## 2020-10-12 NOTE — TELEPHONE ENCOUNTER
Progesterone Micro 100MG Capsules      Last Written Prescription Date:  7/21/2020  Last Fill Quantity: 90,   # refills: 0  Last Office Visit: 7/21/2020  Future Office visit:   none    Arelis Darling CMA

## 2020-10-12 NOTE — TELEPHONE ENCOUNTER
"Requested Prescriptions   Pending Prescriptions Disp Refills     progesterone (PROMETRIUM) 100 MG capsule 90 capsule 0     Sig: TAKE 1 CAPSULE(100 MG) BY MOUTH DAILY       Hormone Replacement Therapy Passed - 10/12/2020  9:22 AM        Passed - Blood pressure under 140/90 in past 12 months     BP Readings from Last 3 Encounters:   07/21/20 128/76   02/18/20 124/62   09/09/19 121/79                 Passed - Recent (12 mo) or future (30 days) visit within the authorizing provider's specialty     Patient has had an office visit with the authorizing provider or a provider within the authorizing providers department within the previous 12 mos or has a future within next 30 days. See \"Patient Info\" tab in inbasket, or \"Choose Columns\" in Meds & Orders section of the refill encounter.              Passed - Patient has mammogram in past 2 years on file if age 50-75        Passed - Medication is active on med list        Passed - Patient is 18 years of age or older        Passed - No active pregnancy on record        Passed - No positive pregnancy test on record in past 12 months           Filled per protocol.    Mary Lou SANDRA R.N.        "

## 2020-10-16 ENCOUNTER — COMMUNICATION - HEALTHEAST (OUTPATIENT)
Dept: SCHEDULING | Facility: CLINIC | Age: 75
End: 2020-10-16

## 2020-10-23 DIAGNOSIS — Z79.890 NEED FOR PROPHYLACTIC HORMONE REPLACEMENT THERAPY (POSTMENOPAUSAL): ICD-10-CM

## 2020-10-23 DIAGNOSIS — N95.1 HOT FLUSHES, PERIMENOPAUSAL: ICD-10-CM

## 2020-10-23 RX ORDER — ESTRADIOL 0.5 MG/1
TABLET ORAL
Qty: 30 TABLET | Refills: 6 | Status: SHIPPED | OUTPATIENT
Start: 2020-10-23 | End: 2021-06-25 | Stop reason: DRUGHIGH

## 2020-10-23 NOTE — TELEPHONE ENCOUNTER
Prescription approved per [facility] Ref[facility].  Kathia MONTGOMERY RN

## 2020-10-27 ENCOUNTER — HOSPITAL ENCOUNTER (OUTPATIENT)
Dept: ULTRASOUND IMAGING | Facility: CLINIC | Age: 75
End: 2020-10-27
Attending: OBSTETRICS & GYNECOLOGY
Payer: MEDICARE

## 2020-10-27 DIAGNOSIS — Z11.59 ENCOUNTER FOR SCREENING FOR OTHER VIRAL DISEASES: Primary | ICD-10-CM

## 2020-10-27 PROCEDURE — 76642 ULTRASOUND BREAST LIMITED: CPT | Mod: LT

## 2020-10-29 DIAGNOSIS — Z11.59 ENCOUNTER FOR SCREENING FOR OTHER VIRAL DISEASES: ICD-10-CM

## 2020-10-29 PROCEDURE — U0003 INFECTIOUS AGENT DETECTION BY NUCLEIC ACID (DNA OR RNA); SEVERE ACUTE RESPIRATORY SYNDROME CORONAVIRUS 2 (SARS-COV-2) (CORONAVIRUS DISEASE [COVID-19]), AMPLIFIED PROBE TECHNIQUE, MAKING USE OF HIGH THROUGHPUT TECHNOLOGIES AS DESCRIBED BY CMS-2020-01-R: HCPCS | Performed by: PATHOLOGY

## 2020-10-30 LAB
SARS-COV-2 RNA SPEC QL NAA+PROBE: NOT DETECTED
SPECIMEN SOURCE: NORMAL

## 2020-11-02 ENCOUNTER — HOSPITAL ENCOUNTER (OUTPATIENT)
Dept: MAMMOGRAPHY | Facility: CLINIC | Age: 75
End: 2020-11-02
Attending: OBSTETRICS & GYNECOLOGY
Payer: MEDICARE

## 2020-11-02 ENCOUNTER — HOSPITAL ENCOUNTER (OUTPATIENT)
Dept: ULTRASOUND IMAGING | Facility: CLINIC | Age: 75
End: 2020-11-02
Attending: OBSTETRICS & GYNECOLOGY
Payer: MEDICARE

## 2020-11-02 DIAGNOSIS — R92.8 ABNORMAL MAMMOGRAM: ICD-10-CM

## 2020-11-02 PROCEDURE — 999N000065 MA POST PROCEDURE LEFT

## 2020-11-02 PROCEDURE — 272N000031 US BREAST BIOPSY CORE NEEDLE LEFT

## 2020-11-02 PROCEDURE — 19084 BX BREAST ADD LESION US IMAG: CPT | Mod: LT

## 2020-11-02 PROCEDURE — 250N000009 HC RX 250: Performed by: RADIOLOGY

## 2020-11-02 PROCEDURE — 88305 TISSUE EXAM BY PATHOLOGIST: CPT | Mod: 26 | Performed by: PATHOLOGY

## 2020-11-02 PROCEDURE — 88305 TISSUE EXAM BY PATHOLOGIST: CPT | Mod: TC | Performed by: OBSTETRICS & GYNECOLOGY

## 2020-11-02 RX ORDER — NAPROXEN 375 MG/1
375 TABLET, DELAYED RELEASE ORAL
COMMUNITY
End: 2021-01-04

## 2020-11-02 RX ADMIN — LIDOCAINE HYDROCHLORIDE 5 ML: 10 INJECTION, SOLUTION EPIDURAL; INFILTRATION; INTRACAUDAL; PERINEURAL at 14:16

## 2020-11-02 NOTE — DISCHARGE INSTRUCTIONS

## 2020-11-03 LAB — COPATH REPORT: NORMAL

## 2020-11-04 ENCOUNTER — TELEPHONE (OUTPATIENT)
Dept: MAMMOGRAPHY | Facility: CLINIC | Age: 75
End: 2020-11-04

## 2020-11-04 NOTE — TELEPHONE ENCOUNTER
Pathology report reviewed with our breast radiologist Dr Garrett, who confirmed the recent breast imaging is concordant with the final pathology results below.    I phoned Ms Escobar, confirmed her full name, date of birth, and informed patient of her ultrasound Guided left Breast Needle Biopsy (11/02/2020) results showing.    - Proliferative fibrocystic changes: Usual ductal epithelial hyperplasia   and columnar cell change with   microcalcifications.   - Small radial scar.   - Negative for atypia and malignancy.     B.  Breast, left/11:30 - 3 cm from nipple, ultrasound-guided core biopsy:   - Proliferative fibrocystic changes: Sclerosing adenosis.   - Microcalcifications present.   - Negative for atypia and malignancy.      Recommended follow up is surgical consult.  This has been scheduled for 11-16-20 with Dr Wright.   Patient states no problems or concerns with her biopsy site.   Questions were answered and my phone number given if she has further questions or concerns.  I informed patient I will notify the ordering provider of the results and recommendations for follow up.  Patient verbalized understanding and agrees with the plan of care.     Maria Luisa Roca RN CBCN  Breast Care Nurse Coordinator  [facility]  [phone]

## 2020-11-04 NOTE — RESULT ENCOUNTER NOTE
Pathology reports have been reviewed with the patient by Maria Luisa Roca RN and  the follow-up office appointment scheduled with Dr. Wright on November 16, 2020

## 2020-11-06 ENCOUNTER — VIRTUAL VISIT (OUTPATIENT)
Dept: FAMILY MEDICINE | Facility: OTHER | Age: 75
End: 2020-11-06

## 2020-11-06 NOTE — PROGRESS NOTES
"Date: 2020 08:59:16  Clinician: Raghav Mehta  Clinician NPI: [id number]  Patient: Alix Escobar  Patient : 1945  Patient Address: [address] [place] [address] [place], [place] [address]  Patient Phone: [phone]  Visit Protocol: URI  Patient Summary:  Alix is a 75 year old ( : 1945 ) female who initiated a [facility] Visit for COVID-19 (Coronavirus) evaluation and screening. When asked the question \"Please sign me up to receive news, health information and promotions. \", Alix responded \"Yes\".    Alix states her symptoms started 1-2 days ago.   Her symptoms consist of malaise and a headache. She is experiencing mild difficulty breathing with activities but can speak normally in full sentences.   Symptom details   Headache: She states the headache is mild (1-3 on a 10 point pain scale).    Alix denies having vomiting, rhinitis, facial pain or pressure, myalgias, chills, sore throat, teeth pain, ageusia, diarrhea, ear pain, wheezing, fever, cough, nasal congestion, nausea, and anosmia. She also denies taking antibiotic medication in the past month and having recent facial or sinus surgery in the past 60 days.    Pertinent COVID-19 (Coronavirus) information  Alix does not work or volunteer as healthcare worker or a . In the past 14 days, Alix has not worked or volunteered at a healthcare facility or group living setting.   In the past 14 days, she also has not lived in a congregate living setting.   Alix has not had a close contact with a laboratory-confirmed COVID-19 patient within 14 days of symptom onset.    Since 2019, Alix has been tested for COVID-19 and has had upper respiratory infection (URI) or influenza-like illness.      Result of COVID-19 test: Negative     Date of her COVID-19 test: 10/29/2020     Date(s) of previous URI or influenza-like illness (free-text): 2020     Symptoms Alix experienced during previous URI or influenza-like illness as reported by the " patient (free-text): Nausea and vomiting all afternoon and evening.  Some difficulty breathing, on and off.  My neck arthritis pain very bad, worse than usual.  Took many naps.  9 pm nearly fainted walking downstairs (very unusual for me).  I feel better this morning.        Pertinent medical history  Alix does not get yeast infections when she takes antibiotics.   Alix does not need a return to work/school note.   Weight: 138 lbs   Alix does not smoke or use smokeless tobacco.   Additional information as reported by the patient (free text): A suspicious area in the left breast is going to warrant surgery, I'm told.  I'll probably have to have another Covid test before they do that.  But this thing yesterday came on so suddenly, that I thought I should have it checked.   Weight: 138 lbs    MEDICATIONS: estradiol transdermal, progesterone micronized oral, estradiol oral, ALLERGIES: NKDA  Clinician Response:  Dear Alix,   Your symptoms show that you may have coronavirus (COVID-19). This illness can cause fever, cough and trouble breathing. Many people get a mild case and get better on their own. Some people can get very sick.  What should I do?  We would like to test you for this virus.   1. Please call [phone] to schedule your visit. Explain that you were referred by [facility] to have a COVID-19 test. Be ready to share your [facility] visit ID number.  * If you need to schedule in [facility] please call [phone] or for [employer] [place] employees please call [phone].  * If you need to schedule in the [place] area please call [phone]. [place] employees call [phone].  The following will serve as your written order for this COVID Test, ordered by me, for the indication of suspected COVID [Z20.828]: The test will be ordered in [facility], our electronic health record, after you are scheduled. It will show as ordered and authorized by Lasha Mcgee MD.  Order: COVID-19 (Coronavirus) PCR for SYMPTOMATIC testing from  "[facility].   2. When it's time for your COVID test:  Stay at least 6 feet away from others. (If someone will drive you to your test, stay in the backseat, as far away from the  as you can.)   Cover your mouth and nose with a mask, tissue or washcloth.  Go straight to the testing site. Don't make any stops on the way there or back.      3.Starting now: Stay home and away from others (self-isolate) until:   You've had no fever---and no medicine that reduces fever---for one full day (24 hours). And...   Your other symptoms have gotten better. For example, your cough or breathing has improved. And...   At least 10 days have passed since your symptoms started.       During this time, don't leave the house except for testing or medical care.   Stay in your own room, even for meals. Use your own bathroom if you can.   Stay away from others in your home. No hugging, kissing or shaking hands. No visitors.  Don't go to work, school or anywhere else.    Clean \"high touch\" surfaces often (doorknobs, counters, handles, etc.). Use a household cleaning spray or wipes. You'll find a full list of  on the EPA website: www.epa.gov/pesticide-registration/list-n-disinfectants-use-against-sars-cov-2.   Cover your mouth and nose with a mask, tissue or washcloth to avoid spreading germs.  Wash your hands and face often. Use soap and water.  Caregivers in these groups are at risk for severe illness due to COVID-19:  o People 65 years and older  o People who live in a nursing home or long-term care facility  o People with chronic disease (lung, heart, cancer, diabetes, kidney, liver, immunologic)  o People who have a weakened immune system, including those who:   Are in cancer treatment  Take medicine that weakens the immune system, such as corticosteroids  Had a bone marrow or organ transplant  Have an immune deficiency  Have poorly controlled HIV or AIDS  Are obese (body mass index of 40 or higher)  Smoke regularly   o " Caregivers should wear gloves while washing dishes, handling laundry and cleaning bedrooms and bathrooms.  o Use caution when washing and drying laundry: Don't shake dirty laundry, and use the warmest water setting that you can.  o For more tips, go to www.cdc.gov/coronavirus/2019-ncov/downloads/10Things.pdf.    4.Sign up for [facility]. We know it's scary to hear that you might have COVID-19. We want to track your symptoms to make sure you're okay over the next 2 weeks. Please look for an email from [facility]---this is a free, online program that we'll use to keep in touch. To sign up, follow the link in the email. Learn more at [email or web address]  How can I take care of myself?   Get lots of rest. Drink extra fluids (unless a doctor has told you not to).   Take Tylenol (acetaminophen) for fever or pain. If you have liver or kidney problems, ask your family doctor if it's okay to take Tylenol.   Adults can take either:    650 mg (two 325 mg pills) every 4 to 6 hours, or...   1,000 mg (two 500 mg pills) every 8 hours as needed.    Note: Don't take more than 3,000 mg in one day. Acetaminophen is found in many medicines (both prescribed and over-the-counter medicines). Read all labels to be sure you don't take too much.   For children, check the Tylenol bottle for the right dose. The dose is based on the child's age or weight.    If you have other health problems (like cancer, heart failure, an organ transplant or severe kidney disease): Call your specialty clinic if you don't feel better in the next 2 days.       Know when to call 911. Emergency warning signs include:    Trouble breathing or shortness of breath Pain or pressure in the chest that doesn't go away Feeling confused like you haven't felt before, or not being able to wake up Bluish-colored lips or face.  Where can I get more information?    [facility] -- About COVID-19: [email or web address]   CDC -- What to Do If  You're Sick: www.cdc.gov/coronavirus/2019-ncov/about/steps-when-sick.html   CDC -- Ending Home Isolation: www.cdc.gov/coronavirus/2019-ncov/hcp/disposition-in-home-patients.html   [facility] -- Caring for Someone: www.cdc.gov/coronavirus/2019-ncov/if-you-are-sick/care-for-someone.html   [facility] -- Interim Guidance for Hospital Discharge to Home: www.[facility].[place]./diseases/coronavirus/hcp/hospdischarge.pdf   [facility] clinical trials (COVID-19 research studies): clinicalaffairs.[facility]-clinical-trials    Below are the COVID-19 hotlines at the [place] Department of Health ([facility]). Interpreters are available.    For health questions: Call 002-048-4252 or 1-156.535.1600 (7 a.m. to 7 p.m.) For questions about schools and childcare: Call 811-793-9260 or 1-786.566.3448 (7 a.m. to 7 p.m.)    Diagnosis: Contact with and (suspected) exposure to other viral communicable diseases  Diagnosis ICD: Z20.828  Additional Clinician Notes:  If your symptoms are not improving or worsen, please go to one of our urgent care locations for evaluation and possible lab work.

## 2020-11-07 ENCOUNTER — AMBULATORY - HEALTHEAST (OUTPATIENT)
Dept: FAMILY MEDICINE | Facility: CLINIC | Age: 75
End: 2020-11-07

## 2020-11-07 DIAGNOSIS — Z20.822 SUSPECTED 2019 NOVEL CORONAVIRUS INFECTION: ICD-10-CM

## 2020-11-08 ENCOUNTER — AMBULATORY - HEALTHEAST (OUTPATIENT)
Dept: FAMILY MEDICINE | Facility: CLINIC | Age: 75
End: 2020-11-08

## 2020-11-08 DIAGNOSIS — Z20.822 SUSPECTED 2019 NOVEL CORONAVIRUS INFECTION: ICD-10-CM

## 2020-11-10 ENCOUNTER — COMMUNICATION - HEALTHEAST (OUTPATIENT)
Dept: SCHEDULING | Facility: CLINIC | Age: 75
End: 2020-11-10

## 2020-11-16 ENCOUNTER — PREP FOR PROCEDURE (OUTPATIENT)
Dept: SURGERY | Facility: CLINIC | Age: 75
End: 2020-11-16

## 2020-11-16 ENCOUNTER — OFFICE VISIT (OUTPATIENT)
Dept: SURGERY | Facility: CLINIC | Age: 75
End: 2020-11-16
Payer: MEDICARE

## 2020-11-16 VITALS
SYSTOLIC BLOOD PRESSURE: 122 MMHG | OXYGEN SATURATION: 99 % | RESPIRATION RATE: 16 BRPM | DIASTOLIC BLOOD PRESSURE: 70 MMHG | BODY MASS INDEX: 22.66 KG/M2 | HEART RATE: 80 BPM | HEIGHT: 65 IN | WEIGHT: 136 LBS

## 2020-11-16 DIAGNOSIS — N64.89 RADIAL SCAR OF LEFT BREAST: Primary | ICD-10-CM

## 2020-11-16 PROCEDURE — 99203 OFFICE O/P NEW LOW 30 MIN: CPT | Performed by: SURGERY

## 2020-11-16 ASSESSMENT — MIFFLIN-ST. JEOR: SCORE: 1112.77

## 2020-11-16 NOTE — PROGRESS NOTES
Breast Patients      BREAST PATIENTS (FEMALE)    At what age did your periods begin?23     What was the date of your last menstrual period? 1998     Have you begun menopause? yes    Are you using hormone replacement therapy?  Yes  Type: ?  Dosage: ?    Number of full-term pregnancies: 0    Did you nurse your children? N/A    Are you pregnant now? No    Do you have breast implants? No         BREAST PATIENTS (ALL)    Have you had a previous breast biopsy? Yes  Side: r  Date: 2005    Have you had previous Breast Cancer? No

## 2020-11-16 NOTE — PROGRESS NOTES
REVIEW OF SYSTEMS:    Constitutional: Negative    Cardiovascular: Chest Pain, shortness of breath    Respiratory: Shortness of breath    Endocrine:  Tremor    Hematologic: Negative    Gastrointestinal: Difficulty Swallowing    Genitourinary: Negative    Musculoskeletal: Joint Pain      Integumentary / Breast : Negative    Neurologic: Negative

## 2020-11-16 NOTE — LETTER
2020    RE: Alix Escobar, : 1945      HPI:  Alix Escobar is a 75 year old female who presents to discuss her recent breast imaging.      Breast mass noted:  none  Skin rashes, dimpling or nipple changes:  none  Nipple discharge:   none  Performs self breast exams: Yes -no abnormality found  Previous breast biopsies:  Yes, benign  Previous cyst aspiration:  No  Previous other breast surgery:  No      Family history of breast cancer: No  Family history of ovarian cancer: No     Mammography: an ill-defined density measuring 0.6 cm at the 12 o'clock position and 2 cm from the nipple.     US: hypoechoic region measuring 0.6 cm at the 12 o'clock position and 2 cm from the nipple with a second smaller area 1 cm away at 11:30     Percutaneous core needle biopsy reveals: fibrocystic changes at both sites and in the 12:00 position only, small radial scar formation      Past Medical History:  Has a past medical history of Advanced directives, counseling/discussion (2011), Cervicalgia (2011), Dysphagia, Hyperlipidemia LDL goal <130 (4/15/2011), Hyperlipidemia LDL goal <160 (4/15/2011), Osteoarthritides, Osteopenia (2011), Routine general medical examination at a health care facility (2011), Routine gynecological examination (2011), and Umbilical hernia.    ROS:  The 10 point review of systems is negative other than noted in the HPI and above.     PE:  Vitals: There were no vitals taken for this visit.   Awake, alert, oriented, conversant, normal mood and affect  General appearance: well-nourished, sitting comfortably, no apparent distress  Neck: Supple, without masses or lymphadenopathy   Respirations:  Unlabored  Extremities: Without edema  Neurologic: alert, speech is clear, moves all extremities with good strength  Psychiatric: Mood and affect are appropriate  Skin: Without lesions or rashes  Breast:   Appearance-  Symmetrical with no skin or nipple changes.  Contour is  normal.  Masses- none               Ecchymosis- left              Incisional scar- left, upper outer quadrant     Lymph:   No supraclavicular/infraclavicular adenopathy.                   Axilla: Palpable adenopathy: Right - None                                                                Left - None        Assessment:  Alix is a 75 year old female with radial scar by pathology at the 12 o'clock position of the left breast, 2 cm from the nipple.  An additional lesion benign on biopsy is noted at the 11:30 position.     PLAN:  We have discussed the options for addressing this radial scar.  We discussed seed localized lumpectomy is the standard treatment for radial scar 6 mm in size or larger.  We discussed the surgery in detail including incision, scar, cosmetic changes to the breast contour, numbness that may involve the nipple and may be permanent.  We discussed the possibility that malignancy will be discovered and additional treatment would be necessary.        Jam Wright MD

## 2020-11-16 NOTE — PROGRESS NOTES
HPI:  Alix Escobar is a 75 year old female who presents to discuss her recent breast imaging.     Breast mass noted:  none  Skin rashes, dimpling or nipple changes:  none  Nipple discharge:   none  Performs self breast exams: Yes -no abnormality found  Previous breast biopsies:  Yes, benign  Previous cyst aspiration:  No  Previous other breast surgery:  No     Family history of breast cancer: No  Family history of ovarian cancer: No    Mammography: an ill-defined density measuring 0.6 cm at the 12 o'clock position and 2 cm from the nipple.    US: hypoechoic region measuring 0.6 cm at the 12 o'clock position and 2 cm from the nipple with a second smaller area 1 cm away at 11:30    Percutaneous core needle biopsy reveals: fibrocystic changes at both sites and in the 12:00 position only, small radial scar formation     Past Medical History:   has a past medical history of Advanced directives, counseling/discussion (6/29/2011), Cervicalgia (8/8/2011), Dysphagia, Hyperlipidemia LDL goal <130 (4/15/2011), Hyperlipidemia LDL goal <160 (4/15/2011), Osteoarthritides, Osteopenia (9/20/2011), Routine general medical examination at a health care facility (9/20/2011), Routine gynecological examination (9/20/2011), and Umbilical hernia.    Past Surgical History:  Past Surgical History:   Procedure Laterality Date     BIOPSY      benign breast     COLONOSCOPY  10/1/2013    Procedure: COLONOSCOPY;  Colonoscopy ;  Surgeon: Adair Stoddard MD;  Location: [facility]     EXCISE MASS UPPER EXTREMITY  5/20/2013    Procedure: EXCISE MASS UPPER EXTREMITY;  Remove Mass Right Upper Arm;  Surgeon: Elvira Wilson MD;  Location: [facility]     EYE SURGERY      lasik     wisdom teeth          Social History:  Social History     Socioeconomic History     Marital status:      Spouse name: Not on file     Number of children: Not on file     Years of education: Not on file     Highest education level: Not on file   Occupational  History     Not on file   Social Needs     Financial resource strain: Not on file     Food insecurity     Worry: Not on file     Inability: Not on file     Transportation needs     Medical: Not on file     Non-medical: Not on file   Tobacco Use     Smoking status: Never Smoker     Smokeless tobacco: Never Used   Substance and Sexual Activity     Alcohol use: No     Drug use: No     Sexual activity: Not Currently     Partners: Male   Lifestyle     Physical activity     Days per week: Not on file     Minutes per session: Not on file     Stress: Not on file   Relationships     Social connections     Talks on phone: Not on file     Gets together: Not on file     Attends L[employer] service: Not on file     Active member of club or organization: Not on file     Attends meetings of clubs or organizations: Not on file     Relationship status: Not on file     Intimate partner violence     Fear of current or ex partner: Not on file     Emotionally abused: Not on file     Physically abused: Not on file     Forced sexual activity: Not on file   Other Topics Concern     Parent/sibling w/ CABG, MI or angioplasty before 65F 55M? No   Social History Narrative     Not on file        ROS:  The 10 point review of systems is negative other than noted in the HPI and above.    PE:  Vitals: There were no vitals taken for this visit.   Awake, alert, oriented, conversant, normal mood and affect  General appearance: well-nourished, sitting comfortably, no apparent distress  Neck: Supple, without masses or lymphadenopathy   Respirations:  Unlabored  Extremities: Without edema  Neurologic: alert, speech is clear, moves all extremities with good strength  Psychiatric: Mood and affect are appropriate  Skin: Without lesions or rashes  Breast:   Appearance-  Symmetrical with no skin or nipple changes.  Contour is normal.  Masses- none    Ecchymosis- left   Incisional scar- left, upper outer quadrant    Lymph:   No supraclavicular/infraclavicular  adenopathy.                   Axilla: Palpable adenopathy: Right - None                                                                Left - None      Assessment:  Alix is a 75 year old female with radial scar by pathology at the 12 o'clock position of the left breast, 2 cm from the nipple.  An additional lesion benign on biopsy is noted at the 11:30 position.    PLAN:  We have discussed the options for addressing this radial scar.  We discussed seed localized lumpectomy is the standard treatment for radial scar 6 mm in size or larger.  We discussed the surgery in detail including incision, scar, cosmetic changes to the breast contour, numbness that may involve the nipple and may be permanent.  We discussed the possibility that malignancy will be discovered and additional treatment would be necessary as well as the possibility that the entire radial scar lesion has been removed (it was quite small on imaging) with her core biopsy and none remains for surgical excision.      Jam Wright MD    Please route or send letter to:  Primary Care Provider (PCP) and Include Progress Note

## 2020-11-17 ENCOUNTER — TELEPHONE (OUTPATIENT)
Dept: SURGERY | Facility: CLINIC | Age: 75
End: 2020-11-17

## 2020-11-17 DIAGNOSIS — Z11.59 ENCOUNTER FOR SCREENING FOR OTHER VIRAL DISEASES: Primary | ICD-10-CM

## 2020-11-17 NOTE — TELEPHONE ENCOUNTER
Type of surgery: LEFT BREAST SEED LOCALIZED EXCISIONAL BIOPSY   Location of surgery: [facility]  Date and time of surgery: 1-7-21  Surgeon: DR. FOFANA   Pre-Op Appt Date: PATIENT TO SCHEDULE   Post-Op Appt Date: PATIENT TO SCHEDULE    Packet sent out: Yes  Pre-cert/Authorization completed:  Not Applicable  Date: 11-17-20       LEFT BREAST SEED LOCALIZED EXCISIONAL BIOPSY   GENERAL   PT INST TO HAVE H&P WITH DR. RIOS  60 MINS REQ   PA ASSIST DJM   ALW   L Seed loc at 10 am  alw

## 2020-12-18 ENCOUNTER — OFFICE VISIT (OUTPATIENT)
Dept: FAMILY MEDICINE | Facility: CLINIC | Age: 75
End: 2020-12-18
Payer: MEDICARE

## 2020-12-18 VITALS
OXYGEN SATURATION: 98 % | DIASTOLIC BLOOD PRESSURE: 84 MMHG | RESPIRATION RATE: 16 BRPM | BODY MASS INDEX: 23.63 KG/M2 | HEART RATE: 76 BPM | WEIGHT: 142 LBS | TEMPERATURE: 97.8 F | SYSTOLIC BLOOD PRESSURE: 137 MMHG

## 2020-12-18 DIAGNOSIS — Z01.818 PREOP GENERAL PHYSICAL EXAM: Primary | ICD-10-CM

## 2020-12-18 DIAGNOSIS — N64.89 RADIAL SCAR OF LEFT BREAST: ICD-10-CM

## 2020-12-18 LAB — HGB BLD-MCNC: 13.9 G/DL (ref 11.7–15.7)

## 2020-12-18 PROCEDURE — 36415 COLL VENOUS BLD VENIPUNCTURE: CPT | Performed by: PHYSICIAN ASSISTANT

## 2020-12-18 PROCEDURE — 99214 OFFICE O/P EST MOD 30 MIN: CPT | Performed by: PHYSICIAN ASSISTANT

## 2020-12-18 PROCEDURE — 85018 HEMOGLOBIN: CPT | Performed by: PHYSICIAN ASSISTANT

## 2020-12-18 RX ORDER — NAPROXEN SODIUM 220 MG
220 TABLET ORAL 2 TIMES DAILY WITH MEALS
COMMUNITY
Start: 2020-12-18 | End: 2021-01-04

## 2020-12-18 NOTE — PROGRESS NOTES
[facility]  [id number] [place] [id number]  Phone: [phone]  Primary Provider: Danielle Rios  Pre-op Performing Provider: DANIELLE RIOS    PREOPERATIVE EVALUATION:  Today's date: 12/18/2020    Alix Escobar is a 75 year old female who presents for a preoperative evaluation.    Surgical Information:  Surgery/Procedure: LEFT BREAST SEED LOCALIZED EXCISIONAL BIOPSY  Surgery Location: [facility]   Surgeon: Dr. Jam Wright  Surgery Date: 01/07/2021   Time of Surgery: 12:00 PM  Where patient plans to recover: At home with family  Fax number for surgical facility: Note does not need to be faxed, will be available electronically in Epic.    Type of Anesthesia Anticipated: General    Subjective     HPI related to upcoming procedure: radial scar left breast    Preop Questions 12/16/2020   1. Have you ever had a heart attack or stroke? No   2. Have you ever had surgery on your heart or blood vessels, such as a stent placement, a coronary artery bypass, or surgery on an artery in your head, neck, heart, or legs? No   3. Do you have chest pain with activity? No   4. Do you have a history of  heart failure? No   5. Do you currently have a cold, bronchitis or symptoms of other infection? No   6. Do you have a cough, shortness of breath, or wheezing? No   7. Do you or anyone in your family have previous history of blood clots? No   8. Do you or does anyone in your family have a serious bleeding problem such as prolonged bleeding following surgeries or cuts? No   9. Have you ever had problems with anemia or been told to take iron pills? No    10. Have you had any abnormal blood loss such as black, tarry or bloody stools, or abnormal vaginal bleeding? No   11. Have you ever had a blood transfusion? No   12. Are you willing to have a blood transfusion if it is medically needed before, during, or after your surgery? Yes   13. Have you or any of  your relatives ever had problems with anesthesia? No   14. Do you have sleep apnea, excessive snoring or daytime drowsiness? No   15. Do you have any artifical heart valves or other implanted medical devices like a pacemaker, defibrillator, or continuous glucose monitor? No   16. Do you have artificial joints? No   17. Are you allergic to latex? No       Health Care Directive:  Patient does not have a Health Care Directive or Living Will: Advance Directive received and scanned. Click on Code in the patient header to view.    Preoperative Review of :   reviewed - no record of controlled substances prescribed.      Status of Chronic Conditions:  See problem list for active medical problems.  Problems all longstanding and stable, except as noted/documented.  See ROS for pertinent symptoms related to these conditions.      Review of Systems  Constitutional, neuro, ENT, endocrine, pulmonary, cardiac, gastrointestinal, genitourinary, musculoskeletal, integument and psychiatric systems are negative, except as otherwise noted.    Patient Active Problem List    Diagnosis Date Noted     Radial scar of left breast 11/16/2020     Priority: Medium     Added automatically from request for surgery [id number]       Gastroesophageal reflux disease, esophagitis presence not specified 04/25/2018     Priority: Medium     IMO Regulatory Load OCT 2020       Onychomycosis 04/25/2018     Priority: Medium     Need for prophylactic hormone replacement therapy (postmenopausal) 03/25/2014     Priority: Medium     Routine general medical examination at a health care facility 09/20/2011     Priority: Medium     Encounter for routine gynecological examination 09/20/2011     Priority: Medium     Problem list name updated by automated process. Provider to review       Osteopenia 09/20/2011     Priority: Medium     Cervicalgia 08/08/2011     Priority: Medium     Advance Care Planning 06/29/2011     Priority: Medium     Advance Care Planning:    Receipt of ACP document:  Received: Health Care Directive which was witnessed or notarized on 2/24/15.  Document not previously scanned.  Validation form completed and sent with document to be scanned.  Code Status reflects choices in most recent ACP document.  Confirmed/documented designated decision maker(s). See permanent comments section of demographics in clinical tab. View document(s) and details by clicking on code status.   Added by Elio Uribe on 2/24/2015.      Advance Care Planning: Initial facilitation introduction:   Alix Escobar presented for initial session regarding ACP at a group session. She was accompanied by spouse. Hon[employer] Choices information provided and resources reviewed. She currently wishes to revise an existing ACP document.  She currently has the following questions or concerns about Advance Care Planning: none.  Confirmed/documented designated decision maker(s). See permanent comments section of demographics in clinical tab. Added by Omayra Cardenas on 1/21/2015  Advance Care Planning: Receipt of ACP document:  Received: Health Care Directive which was witnessed or notarized on 10-2-00.  Document previously scanned on 5-21-13.  Validation form completed and sent to be scanned.  Code Status needs to be updated to reflect choices.  Confirmed/documented designated decision maker(s). See permanent comments section of demographics in clinical tab. View document(s) and details by clicking on code status. Added by Radha Aguilar RN, System ACP Coordinator on 6/5/2013.  Advance Directive Problem List Overview:  Patient states has Advance Directive and will bring in a copy to clinic. 6/29/2011          Hyperlipidemia LDL goal <160 04/15/2011     Priority: Medium     Calculated ASCVD risk - 10.3%        Past Medical History:   Diagnosis Date     Advanced directives, counseling/discussion 6/29/2011     Cervicalgia 8/8/2011     Dysphagia      Hyperlipidemia LDL goal <130 4/15/2011      Hyperlipidemia LDL goal <160 4/15/2011    High HDL     Osteoarthritides      Osteopenia 9/20/2011     Routine general medical examination at a health care facility 9/20/2011     Routine gynecological examination 9/20/2011     Umbilical hernia      Past Surgical History:   Procedure Laterality Date     BIOPSY      benign breast     BREAST SURGERY  1985    benign lump removed     COLONOSCOPY  10/1/2013    Procedure: COLONOSCOPY;  Colonoscopy ;  Surgeon: Adair Stoddard MD;  Location: [facility]     EXCISE MASS UPPER EXTREMITY  5/20/2013    Procedure: EXCISE MASS UPPER EXTREMITY;  Remove Mass Right Upper Arm;  Surgeon: Elvira Wilson MD;  Location: [facility]     EYE SURGERY      lasik     wisdom teeth       Current Outpatient Medications   Medication Sig Dispense Refill     calcium citrate-vitamin D (CITRACAL) 315-200 MG-UNIT TABS Take 1 tablet by mouth daily       estradiol (ESTRACE) 0.1 MG/GM vaginal cream Place 2 g vaginally twice a week 70 g 8     estradiol (ESTRACE) 0.5 MG tablet TAKE 1 TABLET BY MOUTH ONCE DAILY 30 tablet 6     multivitamin, therapeutic with minerals (MULTI-VITAMIN) TABS Take 1 tablet by mouth daily       naproxen (NAPROXEN) 375 MG TBEC Take 375 mg by mouth       progesterone (PROMETRIUM) 100 MG capsule TAKE 1 CAPSULE(100 MG) BY MOUTH DAILY 90 capsule 1       Allergies   Allergen Reactions     Seasonal Allergies         Social History     Tobacco Use     Smoking status: Never Smoker     Smokeless tobacco: Never Used   Substance Use Topics     Alcohol use: No     Family History   Problem Relation Age of Onset     Cerebrovascular Disease Father         parkinsons     C.A.D. Mother      Coronary Artery Disease Mother         100 years old, CHF     Unknown/Adopted Maternal Grandmother      Hypertension Maternal Grandfather      Hypertension Paternal Grandfather      Blood Disease Sister      Neurologic Disorder Sister         epilepsy     Arthritis Other         neck and thumb joints     History    Drug Use No         Objective     /84 (BP Location: Right arm, Patient Position: Sitting, Cuff Size: Adult Regular)   Pulse 76   Temp 97.8  F (36.6  C) (Oral)   Resp 16   Wt 64.4 kg (142 lb)   SpO2 98%   Breastfeeding No   BMI 23.63 kg/m      Physical Exam    GENERAL APPEARANCE: healthy, alert and no distress     EYES: EOMI, PERRL     HENT: ear canals and TM's normal and nose and mouth without ulcers or lesions     NECK: no adenopathy, no asymmetry, masses, or scars and thyroid normal to palpation     RESP: lungs clear to auscultation - no rales, rhonchi or wheezes     CV: regular rates and rhythm, normal S1 S2, no S3 or S4 and no murmur, click or rub     ABDOMEN:  soft, nontender, no HSM or masses and bowel sounds normal     MS: extremities normal- no gross deformities noted, no evidence of inflammation in joints, FROM in all extremities.     SKIN: no suspicious lesions or rashes     NEURO: Normal strength and tone, sensory exam grossly normal, mentation intact and speech normal     PSYCH: mentation appears normal. and affect normal/bright     LYMPHATICS: No cervical adenopathy    Recent Labs   Lab Test 03/11/19  0815   HGB 13.6         POTASSIUM 3.8   CR 0.80        Diagnostics:  Recent Results (from the past 24 hour(s))   Hemoglobin    Collection Time: 12/18/20 11:12 AM   Result Value Ref Range    Hemoglobin 13.9 11.7 - 15.7 g/dL      No EKG required, no history of coronary heart disease, significant arrhythmia, peripheral arterial disease or other structural heart disease.    Revised Cardiac Risk Index (RCRI):  The patient has the following serious cardiovascular risks for perioperative complications:   - No serious cardiac risks = 0 points     RCRI Interpretation: 0 points: Class I (very low risk - 0.4% complication rate)     Assessment & Plan   The proposed surgical procedure is considered INTERMEDIATE risk.    Preop general physical exam    - Hemoglobin    Radial scar of left  breast           Risks and Recommendations:  The patient has the following additional risks and recommendations for perioperative complications:   - No identified additional risk factors other than previously addressed    Medication Instructions:   - naproxen (Aleve, Naprosyn): HOLD 4 days before surgery.     RECOMMENDATION:  APPROVAL GIVEN to proceed with proposed procedure, without further diagnostic evaluation.    Signed Electronically by: Danielle Borrero PA-C    Copy of this evaluation report is provided to requesting physician.    Preop [facility] Preop Guidelines    Revised Cardiac Risk Index

## 2020-12-18 NOTE — PATIENT INSTRUCTIONS

## 2021-01-04 DIAGNOSIS — Z11.59 ENCOUNTER FOR SCREENING FOR OTHER VIRAL DISEASES: ICD-10-CM

## 2021-01-04 PROCEDURE — U0005 INFEC AGEN DETEC AMPLI PROBE: HCPCS | Performed by: SURGERY

## 2021-01-04 PROCEDURE — U0003 INFECTIOUS AGENT DETECTION BY NUCLEIC ACID (DNA OR RNA); SEVERE ACUTE RESPIRATORY SYNDROME CORONAVIRUS 2 (SARS-COV-2) (CORONAVIRUS DISEASE [COVID-19]), AMPLIFIED PROBE TECHNIQUE, MAKING USE OF HIGH THROUGHPUT TECHNOLOGIES AS DESCRIBED BY CMS-2020-01-R: HCPCS | Performed by: SURGERY

## 2021-01-04 RX ORDER — NAPROXEN SODIUM 220 MG
220 TABLET ORAL 2 TIMES DAILY WITH MEALS
COMMUNITY

## 2021-01-05 LAB
SARS-COV-2 RNA SPEC QL NAA+PROBE: NOT DETECTED
SPECIMEN SOURCE: NORMAL

## 2021-01-07 ENCOUNTER — HOSPITAL ENCOUNTER (OUTPATIENT)
Dept: MAMMOGRAPHY | Facility: CLINIC | Age: 76
End: 2021-01-07
Attending: SURGERY | Admitting: SURGERY
Payer: MEDICARE

## 2021-01-07 ENCOUNTER — ANESTHESIA (OUTPATIENT)
Dept: SURGERY | Facility: CLINIC | Age: 76
End: 2021-01-07
Payer: MEDICARE

## 2021-01-07 ENCOUNTER — HOSPITAL ENCOUNTER (OUTPATIENT)
Facility: CLINIC | Age: 76
Discharge: HOME | End: 2021-01-07
Attending: SURGERY | Admitting: SURGERY
Payer: MEDICARE

## 2021-01-07 ENCOUNTER — HOSPITAL ENCOUNTER (OUTPATIENT)
Dept: ULTRASOUND IMAGING | Facility: CLINIC | Age: 76
End: 2021-01-07
Attending: SURGERY | Admitting: SURGERY
Payer: MEDICARE

## 2021-01-07 ENCOUNTER — APPOINTMENT (OUTPATIENT)
Dept: MAMMOGRAPHY | Facility: CLINIC | Age: 76
End: 2021-01-07
Attending: SURGERY
Payer: MEDICARE

## 2021-01-07 ENCOUNTER — APPOINTMENT (OUTPATIENT)
Dept: SURGERY | Facility: PHYSICIAN GROUP | Age: 76
End: 2021-01-07
Payer: MEDICARE

## 2021-01-07 ENCOUNTER — ANESTHESIA EVENT (OUTPATIENT)
Dept: SURGERY | Facility: CLINIC | Age: 76
End: 2021-01-07
Payer: MEDICARE

## 2021-01-07 VITALS
OXYGEN SATURATION: 99 % | HEART RATE: 82 BPM | DIASTOLIC BLOOD PRESSURE: 98 MMHG | SYSTOLIC BLOOD PRESSURE: 161 MMHG | TEMPERATURE: 97.5 F | RESPIRATION RATE: 16 BRPM

## 2021-01-07 DIAGNOSIS — N64.89 RADIAL SCAR OF LEFT BREAST: ICD-10-CM

## 2021-01-07 PROCEDURE — A4648 IMPLANTABLE TISSUE MARKER: HCPCS

## 2021-01-07 PROCEDURE — 250N000011 HC RX IP 250 OP 636: Performed by: NURSE ANESTHETIST, CERTIFIED REGISTERED

## 2021-01-07 PROCEDURE — 88341 IMHCHEM/IMCYTCHM EA ADD ANTB: CPT | Mod: 26 | Performed by: PATHOLOGY

## 2021-01-07 PROCEDURE — 250N000013 HC RX MED GY IP 250 OP 250 PS 637: Performed by: ANESTHESIOLOGY

## 2021-01-07 PROCEDURE — 258N000003 HC RX IP 258 OP 636: Performed by: ANESTHESIOLOGY

## 2021-01-07 PROCEDURE — 370N000017 HC ANESTHESIA TECHNICAL FEE, PER MIN: Performed by: SURGERY

## 2021-01-07 PROCEDURE — 88307 TISSUE EXAM BY PATHOLOGIST: CPT | Mod: TC | Performed by: SURGERY

## 2021-01-07 PROCEDURE — 88307 TISSUE EXAM BY PATHOLOGIST: CPT | Mod: 26 | Performed by: PATHOLOGY

## 2021-01-07 PROCEDURE — 88341 IMHCHEM/IMCYTCHM EA ADD ANTB: CPT | Mod: TC | Performed by: SURGERY

## 2021-01-07 PROCEDURE — 250N000009 HC RX 250: Performed by: NURSE ANESTHETIST, CERTIFIED REGISTERED

## 2021-01-07 PROCEDURE — 710N000009 HC RECOVERY PHASE 1, LEVEL 1, PER MIN: Performed by: SURGERY

## 2021-01-07 PROCEDURE — 88342 IMHCHEM/IMCYTCHM 1ST ANTB: CPT | Mod: 26 | Performed by: PATHOLOGY

## 2021-01-07 PROCEDURE — 88360 TUMOR IMMUNOHISTOCHEM/MANUAL: CPT | Mod: 26 | Performed by: PATHOLOGY

## 2021-01-07 PROCEDURE — 999N000065 MA POST PROCEDURE LEFT

## 2021-01-07 PROCEDURE — 272N000001 HC OR GENERAL SUPPLY STERILE: Performed by: SURGERY

## 2021-01-07 PROCEDURE — 360N000081 HC SURGERY LEVEL 1 W/ FLUORO, PER MIN: Performed by: SURGERY

## 2021-01-07 PROCEDURE — 88342 IMHCHEM/IMCYTCHM 1ST ANTB: CPT | Mod: TC | Performed by: SURGERY

## 2021-01-07 PROCEDURE — 999N000141 HC STATISTIC PRE-PROCEDURE NURSING ASSESSMENT: Performed by: SURGERY

## 2021-01-07 PROCEDURE — 19125 EXCISION BREAST LESION: CPT | Mod: RT | Performed by: SURGERY

## 2021-01-07 PROCEDURE — 88360 TUMOR IMMUNOHISTOCHEM/MANUAL: CPT | Mod: TC | Performed by: SURGERY

## 2021-01-07 PROCEDURE — 250N000011 HC RX IP 250 OP 636: Performed by: PHYSICIAN ASSISTANT

## 2021-01-07 PROCEDURE — 250N000009 HC RX 250: Performed by: RADIOLOGY

## 2021-01-07 PROCEDURE — 710N000012 HC RECOVERY PHASE 2, PER MINUTE: Performed by: SURGERY

## 2021-01-07 PROCEDURE — 999N000104 MA BREAST SPECIMEN LEFT OR

## 2021-01-07 PROCEDURE — 250N000009 HC RX 250: Performed by: SURGERY

## 2021-01-07 RX ORDER — HYDROMORPHONE HYDROCHLORIDE 1 MG/ML
.3-.5 INJECTION, SOLUTION INTRAMUSCULAR; INTRAVENOUS; SUBCUTANEOUS EVERY 10 MIN PRN
Status: DISCONTINUED | OUTPATIENT
Start: 2021-01-07 | End: 2021-01-07 | Stop reason: HOSPADM

## 2021-01-07 RX ORDER — ACETAMINOPHEN 325 MG/1
975 TABLET ORAL ONCE
Status: COMPLETED | OUTPATIENT
Start: 2021-01-07 | End: 2021-01-07

## 2021-01-07 RX ORDER — FENTANYL CITRATE 50 UG/ML
INJECTION, SOLUTION INTRAMUSCULAR; INTRAVENOUS PRN
Status: DISCONTINUED | OUTPATIENT
Start: 2021-01-07 | End: 2021-01-07

## 2021-01-07 RX ORDER — METOPROLOL TARTRATE 1 MG/ML
1-2 INJECTION, SOLUTION INTRAVENOUS EVERY 5 MIN PRN
Status: DISCONTINUED | OUTPATIENT
Start: 2021-01-07 | End: 2021-01-07 | Stop reason: HOSPADM

## 2021-01-07 RX ORDER — MEPERIDINE HYDROCHLORIDE 25 MG/ML
12.5 INJECTION INTRAMUSCULAR; INTRAVENOUS; SUBCUTANEOUS
Status: DISCONTINUED | OUTPATIENT
Start: 2021-01-07 | End: 2021-01-07 | Stop reason: HOSPADM

## 2021-01-07 RX ORDER — NALOXONE HYDROCHLORIDE 0.4 MG/ML
0.4 INJECTION, SOLUTION INTRAMUSCULAR; INTRAVENOUS; SUBCUTANEOUS
Status: DISCONTINUED | OUTPATIENT
Start: 2021-01-07 | End: 2021-01-07 | Stop reason: HOSPADM

## 2021-01-07 RX ORDER — SODIUM CHLORIDE, SODIUM LACTATE, POTASSIUM CHLORIDE, CALCIUM CHLORIDE 600; 310; 30; 20 MG/100ML; MG/100ML; MG/100ML; MG/100ML
INJECTION, SOLUTION INTRAVENOUS CONTINUOUS
Status: DISCONTINUED | OUTPATIENT
Start: 2021-01-07 | End: 2021-01-07 | Stop reason: HOSPADM

## 2021-01-07 RX ORDER — BUPIVACAINE HYDROCHLORIDE AND EPINEPHRINE 2.5; 5 MG/ML; UG/ML
INJECTION, SOLUTION EPIDURAL; INFILTRATION; INTRACAUDAL; PERINEURAL PRN
Status: DISCONTINUED | OUTPATIENT
Start: 2021-01-07 | End: 2021-01-07 | Stop reason: HOSPADM

## 2021-01-07 RX ORDER — DEXAMETHASONE SODIUM PHOSPHATE 4 MG/ML
INJECTION, SOLUTION INTRA-ARTICULAR; INTRALESIONAL; INTRAMUSCULAR; INTRAVENOUS; SOFT TISSUE PRN
Status: DISCONTINUED | OUTPATIENT
Start: 2021-01-07 | End: 2021-01-07

## 2021-01-07 RX ORDER — OXYCODONE HYDROCHLORIDE 5 MG/1
5 TABLET ORAL
Status: DISCONTINUED | OUTPATIENT
Start: 2021-01-07 | End: 2021-01-07 | Stop reason: HOSPADM

## 2021-01-07 RX ORDER — OXYCODONE HYDROCHLORIDE 5 MG/1
5-10 TABLET ORAL EVERY 4 HOURS PRN
Qty: 6 TABLET | Refills: 0 | Status: SHIPPED | OUTPATIENT
Start: 2021-01-07 | End: 2021-05-26

## 2021-01-07 RX ORDER — NALOXONE HYDROCHLORIDE 0.4 MG/ML
0.2 INJECTION, SOLUTION INTRAMUSCULAR; INTRAVENOUS; SUBCUTANEOUS
Status: DISCONTINUED | OUTPATIENT
Start: 2021-01-07 | End: 2021-01-07 | Stop reason: HOSPADM

## 2021-01-07 RX ORDER — ALBUTEROL SULFATE 0.83 MG/ML
2.5 SOLUTION RESPIRATORY (INHALATION) EVERY 4 HOURS PRN
Status: DISCONTINUED | OUTPATIENT
Start: 2021-01-07 | End: 2021-01-07 | Stop reason: HOSPADM

## 2021-01-07 RX ORDER — FENTANYL CITRATE 50 UG/ML
25-50 INJECTION, SOLUTION INTRAMUSCULAR; INTRAVENOUS
Status: DISCONTINUED | OUTPATIENT
Start: 2021-01-07 | End: 2021-01-07 | Stop reason: HOSPADM

## 2021-01-07 RX ORDER — LIDOCAINE HYDROCHLORIDE 10 MG/ML
INJECTION, SOLUTION INFILTRATION; PERINEURAL PRN
Status: DISCONTINUED | OUTPATIENT
Start: 2021-01-07 | End: 2021-01-07

## 2021-01-07 RX ORDER — CEFAZOLIN SODIUM 1 G/3ML
1 INJECTION, POWDER, FOR SOLUTION INTRAMUSCULAR; INTRAVENOUS SEE ADMIN INSTRUCTIONS
Status: DISCONTINUED | OUTPATIENT
Start: 2021-01-07 | End: 2021-01-07 | Stop reason: HOSPADM

## 2021-01-07 RX ORDER — ONDANSETRON 2 MG/ML
INJECTION INTRAMUSCULAR; INTRAVENOUS PRN
Status: DISCONTINUED | OUTPATIENT
Start: 2021-01-07 | End: 2021-01-07

## 2021-01-07 RX ORDER — ONDANSETRON 4 MG/1
4 TABLET, ORALLY DISINTEGRATING ORAL EVERY 30 MIN PRN
Status: DISCONTINUED | OUTPATIENT
Start: 2021-01-07 | End: 2021-01-07 | Stop reason: HOSPADM

## 2021-01-07 RX ORDER — ONDANSETRON 2 MG/ML
4 INJECTION INTRAMUSCULAR; INTRAVENOUS EVERY 30 MIN PRN
Status: DISCONTINUED | OUTPATIENT
Start: 2021-01-07 | End: 2021-01-07 | Stop reason: HOSPADM

## 2021-01-07 RX ORDER — PROPOFOL 10 MG/ML
INJECTION, EMULSION INTRAVENOUS PRN
Status: DISCONTINUED | OUTPATIENT
Start: 2021-01-07 | End: 2021-01-07

## 2021-01-07 RX ORDER — CEFAZOLIN SODIUM 2 G/100ML
2 INJECTION, SOLUTION INTRAVENOUS
Status: COMPLETED | OUTPATIENT
Start: 2021-01-07 | End: 2021-01-07

## 2021-01-07 RX ORDER — GLYCOPYRROLATE 0.2 MG/ML
INJECTION, SOLUTION INTRAMUSCULAR; INTRAVENOUS PRN
Status: DISCONTINUED | OUTPATIENT
Start: 2021-01-07 | End: 2021-01-07

## 2021-01-07 RX ORDER — LIDOCAINE 40 MG/G
CREAM TOPICAL
Status: DISCONTINUED | OUTPATIENT
Start: 2021-01-07 | End: 2021-01-07 | Stop reason: HOSPADM

## 2021-01-07 RX ORDER — PROPOFOL 10 MG/ML
INJECTION, EMULSION INTRAVENOUS CONTINUOUS PRN
Status: DISCONTINUED | OUTPATIENT
Start: 2021-01-07 | End: 2021-01-07

## 2021-01-07 RX ADMIN — CEFAZOLIN SODIUM 2 G: 2 INJECTION, SOLUTION INTRAVENOUS at 12:15

## 2021-01-07 RX ADMIN — GLYCOPYRROLATE 0.2 MG: 0.2 INJECTION, SOLUTION INTRAMUSCULAR; INTRAVENOUS at 12:17

## 2021-01-07 RX ADMIN — SODIUM CHLORIDE, POTASSIUM CHLORIDE, SODIUM LACTATE AND CALCIUM CHLORIDE: 600; 310; 30; 20 INJECTION, SOLUTION INTRAVENOUS at 12:55

## 2021-01-07 RX ADMIN — LIDOCAINE HYDROCHLORIDE 5 ML: 10 INJECTION, SOLUTION EPIDURAL; INFILTRATION; INTRACAUDAL; PERINEURAL at 10:28

## 2021-01-07 RX ADMIN — SODIUM CHLORIDE, POTASSIUM CHLORIDE, SODIUM LACTATE AND CALCIUM CHLORIDE: 600; 310; 30; 20 INJECTION, SOLUTION INTRAVENOUS at 12:16

## 2021-01-07 RX ADMIN — PROPOFOL 150 MG: 10 INJECTION, EMULSION INTRAVENOUS at 12:17

## 2021-01-07 RX ADMIN — DEXAMETHASONE SODIUM PHOSPHATE 4 MG: 4 INJECTION, SOLUTION INTRA-ARTICULAR; INTRALESIONAL; INTRAMUSCULAR; INTRAVENOUS; SOFT TISSUE at 12:17

## 2021-01-07 RX ADMIN — LIDOCAINE HYDROCHLORIDE 30 MG: 10 INJECTION, SOLUTION INFILTRATION; PERINEURAL at 12:17

## 2021-01-07 RX ADMIN — PROPOFOL 40 MCG/KG/MIN: 10 INJECTION, EMULSION INTRAVENOUS at 12:23

## 2021-01-07 RX ADMIN — FENTANYL CITRATE 100 MCG: 50 INJECTION, SOLUTION INTRAMUSCULAR; INTRAVENOUS at 12:17

## 2021-01-07 RX ADMIN — ACETAMINOPHEN 650 MG: 325 TABLET, FILM COATED ORAL at 13:57

## 2021-01-07 RX ADMIN — ONDANSETRON HYDROCHLORIDE 4 MG: 2 INJECTION, SOLUTION INTRAVENOUS at 12:25

## 2021-01-07 NOTE — ANESTHESIA CARE TRANSFER NOTE
Patient: Alix Escobar    Procedure(s):  LEFT BREAST SEED LOCALIZED EXCISIONAL BIOPSY    Diagnosis: Radial scar of left breast [N64.89]  Diagnosis Additional Information: No value filed.    Anesthesia Type:   General     Note:  Airway :Face Mask  Patient transferred to:PACU  Comments: To PACU, report to RN, oxygen per face mask.      Vitals: (Last set prior to Anesthesia Care Transfer)    CRNA VITALS  1/7/2021 1236 - 1/7/2021 1313      1/7/2021             SpO2:  100 %                Electronically Signed By: MAMADOU Guajardo CRNA  January 7, 2021  1:13 PM

## 2021-01-07 NOTE — ANESTHESIA PREPROCEDURE EVALUATION
Anesthesia Pre-Procedure Evaluation    Patient: Alix Escobar   MRN: [id number] : 1945          Preoperative Diagnosis: Radial scar of left breast [N64.89]    Procedure(s):  LEFT BREAST SEED LOCALIZED EXCISIONAL BIOPSY    Past Medical History:   Diagnosis Date     Advanced directives, counseling/discussion 2011     Cervicalgia 2011     Dysphagia      Hyperlipidemia LDL goal <130 4/15/2011     Hyperlipidemia LDL goal <160 4/15/2011    High HDL     Osteoarthritides      Osteopenia 2011     Routine general medical examination at a [facility] facility 2011     Routine gynecological examination 2011     Umbilical hernia      Past Surgical History:   Procedure Laterality Date     BIOPSY      benign breast, right breast.     BREAST SURGERY      benign lump removed. Left breast.     COLONOSCOPY  10/1/2013    Procedure: COLONOSCOPY;  Colonoscopy ;  Surgeon: Adair Stoddard MD;  Location:  GI     EXCISE MASS UPPER EXTREMITY  2013    Procedure: EXCISE MASS UPPER EXTREMITY;  Remove Mass Right Upper Arm;  Surgeon: Elvira Wilson MD;  Location: [facility]     EYE SURGERY      lasik     wisdom teeth       Anesthesia Evaluation     .             ROS/MED HX    ENT/Pulmonary:  - neg pulmonary ROS     Neurologic:  - neg neurologic ROS     Cardiovascular:     (+) Dyslipidemia, ----. : . . . :. .       METS/Exercise Tolerance:     Hematologic:  - neg hematologic  ROS       Musculoskeletal:   (+) arthritis,  -       GI/Hepatic:     (+) GERD       Renal/Genitourinary:  - ROS Renal section negative       Endo:  - neg endo ROS       Psychiatric:  - neg psychiatric ROS       Infectious Disease:  - neg infectious disease ROS       Malignancy:   (+) Malignancy           Other: Comment: .Lab Test        12/18/20     03/11/19     10/09/17     04/07/15     04/07/15                       1112          0815          0817          1016          1016          WBC           --          5.6           6.3           --          5.9           HGB          13.9         13.6         14.3           < >        13.6          MCV           --          92           88            --          90            PLT           --          286          280           --          297            < > = values in this interval not displayed.                  Lab Test        03/11/19     10/09/17     07/22/16                       0815          0817          0904          NA           139          140          140           POTASSIUM    3.8          4.0          4.1           CHLORIDE     105          105          106           CO2          27           30           33*           BUN          20           18           18            CR           0.80         0.82         0.79          ANIONGAP     7            5            1*            WONG          9.0          9.6          9.0           GLC          87           89           90                                     Physical Exam  Normal systems: cardiovascular and pulmonary    Airway   Mallampati: II    Dental     Cardiovascular   Rhythm and rate: regular and normal      Pulmonary    breath sounds clear to auscultation            Lab Results   Component Value Date    WBC 5.6 03/11/2019    HGB 13.9 12/18/2020    HCT 41.9 03/11/2019     03/11/2019     03/11/2019    POTASSIUM 3.8 03/11/2019    CHLORIDE 105 03/11/2019    CO2 27 03/11/2019    BUN 20 03/11/2019    CR 0.80 03/11/2019    GLC 87 03/11/2019    WONG 9.0 03/11/2019    ALBUMIN 3.9 03/11/2019    PROTTOTAL 7.1 03/11/2019    ALT 23 03/11/2019    AST 26 03/11/2019    ALKPHOS 66 03/11/2019    BILITOTAL 0.6 03/11/2019    TSH 3.52 03/11/2019       Preop Vitals  BP Readings from Last 3 Encounters:   01/07/21 (!) 141/91   12/18/20 137/84   11/16/20 122/70    Pulse Readings from Last 3 Encounters:   01/07/21 70   12/18/20 76   11/16/20 80      Resp Readings from Last 3 Encounters:   01/07/21 16   12/18/20 16   11/16/20 16     "SpO2 Readings from Last 3 Encounters:   01/07/21 95%   12/18/20 98%   11/16/20 99%      Temp Readings from Last 1 Encounters:   01/07/21 98.3  F (36.8  C) (Temporal)    Ht Readings from Last 1 Encounters:   11/16/20 1.651 m (5' 5\")      Wt Readings from Last 1 Encounters:   12/18/20 64.4 kg (142 lb)    Estimated body mass index is 23.63 kg/m  as calculated from the following:    Height as of 11/16/20: 1.651 m (5' 5\").    Weight as of 12/18/20: 64.4 kg (142 lb).       Anesthesia Plan      History & Physical Review  History and physical reviewed and following examination; no interval change.    ASA Status:  2 .        Plan for General with Intravenous induction. Maintenance will be Inhalation and Balanced.    PONV prophylaxis:  Ondansetron (or other 5HT-3) and Dexamethasone or Solumedrol         Postoperative Care  Postoperative pain management:  IV analgesics, Oral pain medications and Multi-modal analgesia.      Consents  Anesthetic plan, risks, benefits and alternatives discussed with:  Patient or representative..                 Juvenal Palafox DO                    .  "

## 2021-01-07 NOTE — PROGRESS NOTES
SBAR Seed Localization    SITUATION:  Patient to breast imaging center for imaging guided seed localizations before breast lumpectomy or excision biopsy without sentinel node injection.    BACKGROUND:  Breast imaging cancer, breast abnormality  Ordered procedure completed: Yes  Special needs identified: No     ASSESSMENT:  SBAR report called to patient care unit because of unexpected event in radiology: No  Allergies and medication list reviewed prior to procedure. Yes  Skin cleansed with ChloraPrep One-Step.  Anesthesia: approximately 5ml of 1% Lidocaine injection subcutaneous before seed insertion administered by the radiologist.   Gauze dressing over insertion site(s).  Post procedure mammogram completed: Yes    Patient tolerance: patient tolerated ultrasound guided radioactive seed localization of the left breast well.    RECOMMENDATIONS:  Patient transferred to Same Day Surgery in stable condition via wheelchair with Transport Services.    Please call [facility] [place] [phone] if there are any questions.

## 2021-01-07 NOTE — OP NOTE
General Surgery Operative Note      Pre-operative diagnosis: Left breast radial scar   Post-operative diagnosis: Same   Procedure: Left seed-localized breast biopsy, upper outer quadrant    Surgeon: Jam Wright MD   Assistant(s): Reynold Pena PA-C  The Physician Assistant was medically necessary for their expertise in prepping, camera management, suctioning, suturing and retraction.   Anesthesia: General    Estimated blood loss:   2 cc     Specimens: ID Type Source Tests Collected by Time Destination   A : Left Breast Seed Localized Biopsy  Tissue Breast, Left SURGICAL PATHOLOGY EXAM Jam Wright MD 1/7/2021 12:42 PM           INDICATIONS:  Left breast indistinct density x2, upper outer quadrant.  Percutaneous biopsy reveals radial scar formation.     DESCRIPTION OF PROCEDURE:  The patient was placed on the table in supine position.  Anesthetic was administered.  The left breast was prepped and draped in standard sterile fashion.  We used the seed placed in the [facility] as well as the post-seed mammograms to localize the area of interest.  After anesthetizing the skin we made a periareolar incision incision centered at the 1 o'clock position.  We carried the incision down into the breast tissue and excised the area of interest, including the seed.  The seed had been placed near the lower more medial biopsy site.  We encountered the clip from the superior more lateral biopsy site during our dissection of the superior/lateral margin.  A suture was placed to la the site for pathology as well as to orient them to the superior margin.  The specimen was also marked with a long stitch laterally.  A specimen mammogram was performed and sent via PACS to the Breast Center.  The breast radiologist confirmed the presence of the area of interest including the seed and both of the clips which had been placed at the time of her biopsy.  The target clip and the seed were immediately adjacent to each other as  expected.  Hemostasis was maintained throughout with electrocautery.  New clips were placed at 4 quadrants of the biopsy cavity.  The skin was closed with running 4-0 Vicryl subcuticular suture and Steri-Strips.  The patient tolerated the procedure well.  Sponge and instrument counts were correct.    Jam Wright MD

## 2021-01-07 NOTE — ANESTHESIA POSTPROCEDURE EVALUATION
Patient: Alix Escobar    Procedure(s):  LEFT BREAST SEED LOCALIZED EXCISIONAL BIOPSY    Diagnosis:Radial scar of left breast [N64.89]  Diagnosis Additional Information: No value filed.    Anesthesia Type:  General    Note:  Anesthesia Post Evaluation    Patient location during evaluation: PACU  Patient participation: Able to fully participate in evaluation  Level of consciousness: awake  Pain management: adequate  Airway patency: patent  Cardiovascular status: acceptable  Respiratory status: acceptable  Hydration status: acceptable  PONV: controlled     Anesthetic complications: None          Last vitals:  Vitals:    01/07/21 1315 01/07/21 1330 01/07/21 1345   BP: (!) 147/71 (!) 155/86 (!) 154/90   Pulse: 77 72 78   Resp: 10 9 10   Temp:      SpO2: 100% 99% 100%         Electronically Signed By: Juvenal Palafox DO  January 7, 2021  2:00 PM

## 2021-01-07 NOTE — DISCHARGE INSTRUCTIONS
HOME CARE FOLLOWING BREAST BIOPSY  GHASSAN Gonzalez, JUVE López R. O Donnell, J. Shaheen    RESULTS:  You will receive a phone call from your surgeon or office staff with your pathology results.  Occasionally special testing must be done on the surgical specimen which may delay the posting of your final pathology report.  You may call for your final pathology report after 1p.m. three working days after surgery to check on its status.    INCISIONAL CARE:    If you have a dressing in place, keep clean and dry for 48 hours; you may replace the gauze if it becomes soiled.    After 48 hours you may remove the dressing and shower.  Do not submerse incision in water for 1 week.    Sutures will absorb and do not need to be removed.    If present, leave the steri-strips (white paper tapes) in place for 14 days after surgery.    You may expect a small amount of drainage from your incision.    A lump/ridge under the incision is normal and will gradually resolve.    SUPPORT:  Wear a bra for support and comfort for 3-7 days, day and night.    ACTIVITY:  Cautiously resume exercise and strenuous activities such as jogging, tennis, aerobics, etc. Also, be careful of stretching activities with operative side for two weeks.    DIET:  Start with liquids and gradually resume your regular diet as tolerated.  Increased fluid intake is recommended. While taking pain medications, consider use of a stool softener, increase your fiber in your diet, or add a fiber supplement (like Metamucil, Citrucel) to help prevent constipation - a possible side effect of pain medications.    DISCOMFORT:  Local anesthetic placed at surgery should provide relief for 4-8 hours.  Begin taking pain pills before discomfort is severe.  Take the pain medication with some food, when possible, to minimize side effects.  Intermittent use of ice packs may help during the first 1-3 weeks after surgery.  Expect gradual improvement.     Over-the-counter anti-inflammatory medications (i.e. Ibuprofen/Advil/Motrin or Naprosyn/Aleve) may be used per package instructions in addition to or while tapering off the narcotic pain medications to decrease swelling and sensitivity.  DO NOT TAKE these Anti-inflammatory medications if your primary physician has advised against doing so, or if you have acid reflux, ulcer, or bleeding disorder, or take blood-thinner medications.  Call your primary physician or the surgery office if you have medication questions.      FOLLOW-UP AFTER SURGERY:  -Our office will contact you approximately 2-3 weeks after surgery to check on your progress and answer any questions you may have.  If you are doing well, you will not need to return for an office appointment.  If any concerns are identified over the phone, we will help you make an appointment to see a provider.    -If you have not received a phone call, have any questions or concerns, or would like to be seen, please call us at [phone].  We are located at: [address], [address]; [place] [id number]    -CONTACT US IF THE FOLLOWING DEVELOPS:   1. A fever that is above 101     2. Increased redness, warmth, drainage, bleeding, or swelling.   3. Pain that is not relieved by rest/ice and your prescription.   4.  Increasing pain after 48 hours.   5. Drainage that is thick, cloudy, yellow, green or white.   6. Any other questions or concerns.      FREQUENTLY ASKED QUESTIONS:    Q:  How should my incision look?    A:  Normally your incision will appear slightly swollen with light redness directly along the incision itself as it heals.  It may feel like a bump or ridge as the healing/scarring happens, and over time (3-4 months) this bump or ridge feeling should slowly go away.  In general, clear or pink watery drainage can be normal at first as your incision heals, but should decrease over time.    Q:  How do I know if my incision is infected?  A:  Look at your  incision for signs of infection, like redness around the incision spreading to surrounding skin, or drainage of cloudy or foul-smelling drainage.  If you feel warm, check your temperature to see if you are running a fever.    **If any of these things occur, please notify the nurse at our office.  We may need you to come into the office for an incision check.      Q:  How do I take care of my incision?  A:  If you have a dressing in place - Starting the day after surgery, replace the dressing 1-2 times a day until there is no further drainage from the incision.  At that time, a dressing is no longer needed.  Try to minimize tape on the skin if irritation is occurring at the tape sites.  If you have significant irritation from tape on the skin, please call the office to discuss other method of dressing your incision.    Small pieces of tape called  steri-strips  may be present directly overlying your incision; these may be removed 10 days after surgery unless otherwise specified by your surgeon.  If these tapes start to loosen at the ends, you may trim them back until they fall off or are removed.    Q:  There is a piece of tape or a sticky  lead  still on my skin.  Can I remove this?  A:  Sometimes the sticky  leads  used for monitoring during surgery or for evaluation in the emergency department are not all removed while you are in the hospital.  These sometimes have a tab or metal dot on them.  You can easily remove these on your own, like taking off a band-aid.  If there is a gel substance under the  lead , simply wipe/clean it off with a washcloth or paper towel.      Q:  What can I do to minimize constipation (very hard stools, or lack of stools)?  A:  Stay well hydrated.  Increase your dietary fiber intake or take a fiber supplement -with plenty of water.  Walk around frequently.  You may consider an over-the-counter stool-softener.  Your Pharmacist can assist you with choosing one that is stocked at your  pharmacy.  Constipation is also one of the most common side effects of pain medication.  If you are using pain medication, be pro-active and try to PREVENT problems with constipation by taking the steps above BEFORE constipation becomes a problem.    Q:  What do I do if I need more pain medications?  A:  Call the office to receive refills.  Be aware that certain pain meds cannot be called into a pharmacy and actually require a paper prescription.  A change may be made in your pain med as you progress thru your recovery period or if you have side effects to certain meds.    --Pain meds are NOT refilled after 5pm on weekdays, and NOT AT ALL on the weekends, so please look ahead to prevent problems.    Q:  Why am I having a hard time sleeping now that I am at home?  A:  Many medications you receive while you are in the hospital can impact your sleep for a number of days after your surgery/hospitalization.  Decreased level of activity and naps during the day may also make sleeping at night difficult.  Try to minimize day-time naps, and get up frequently during the day to walk around your home during your recovery time.  Sleep aides may be of some help, but are not recommended for long-term use.      Q:  I am having some back discomfort.  What should I do?  A:  This may be related to certain positioning that was required for your surgery, extended periods of time in bed, or other changes in your overall activity level.  You may try ice, heat, acetaminophen, or ibuprofen to treat this temporarily.  Note that many pain medications have acetaminophen in them and would state this on the prescription bottle.  Be sure not to exceed the maximum of 4000mg per day of acetaminophen.     **If the pain you are having does not resolve, is severe, or is a flare of back pain you have had on other occasions prior to surgery, please contact your primary physician for further recommendations or for an appointment to be examined at their  office.    Q:  Why am I having headaches?  A:  Headaches can be caused by many things:  caffeine withdrawal, use of pain meds, dehydration, high blood pressure, lack of sleep, over-activity/exhaustion, flare-up of usual migraine headaches.  If you feel this is related to muscle tension (a band-like feeling around the head, or a pressure at the low-back of the head) you may try ice or heat to this area.  You may need to drink more fluids (try electrolyte drink like Gatorade), rest, or take your usual migraine medications.   **If your headaches do not resolve, worsen, are accompanied by other symptoms, or if your blood pressure is high, please call your primary physician for recommendation and/or examination.    Q:  I am unable to urinate.  What do I do?  A:  A small percentage of people can have difficulty urinating initially after surgery.  This includes being able to urinate only a very small amount at a time and feeling discomfort or pressure in the very low abdomen.  This is called  urinary retention , and is actually an urgent situation.  Proceed to your nearest Emergency department for evaluation (not an Urgent Care Center).  Sometimes the bladder does not work correctly after certain medications you receive during surgery, or related to certain procedures.  You may need to have a catheter placed until your bladder recovers.  When planning to go to an Emergency department, it may help to call the ER to let them know you are coming in for this problem after a surgery.  This may help you get in quicker to be evaluated.  **If you have symptoms of a urinary tract infection, please contact your primary physician for the proper evaluation and treatment.        If you have other questions, please call the office Monday thru Friday between 8am and 5pm to discuss with the nurse or physician assistant.  #[phone]    There is a surgeon ON CALL on weekday evenings and over the weekend in case of urgent need only, and  may be contacted at the same number.    If you are having an emergency, call 911 or proceed to your nearest emergency department.    GENERAL ANESTHESIA OR SEDATION ADULT DISCHARGE INSTRUCTIONS   SPECIAL PRECAUTIONS FOR 24 HOURS AFTER SURGERY    IT IS NOT UNUSUAL TO FEEL LIGHT-HEADED OR FAINT, UP TO 24 HOURS AFTER SURGERY OR WHILE TAKING PAIN MEDICATION.  IF YOU HAVE THESE SYMPTOMS; SIT FOR A FEW MINUTES BEFORE STANDING AND HAVE SOMEONE ASSIST YOU WHEN YOU GET UP TO WALK OR USE THE BATHROOM.    YOU SHOULD REST AND RELAX FOR THE NEXT 24 HOURS AND YOU MUST MAKE ARRANGEMENTS TO HAVE SOMEONE STAY WITH YOU FOR AT LEAST 24 HOURS AFTER YOUR DISCHARGE.  AVOID HAZARDOUS AND STRENUOUS ACTIVITIES.  DO NOT MAKE IMPORTANT DECISIONS FOR 24 HOURS.    DO NOT DRIVE ANY VEHICLE OR OPERATE MECHANICAL EQUIPMENT FOR 24 HOURS FOLLOWING THE END OF YOUR SURGERY.  EVEN THOUGH YOU MAY FEEL NORMAL, YOUR REACTIONS MAY BE AFFECTED BY THE MEDICATION YOU HAVE RECEIVED.    DO NOT DRINK ALCOHOLIC BEVERAGES FOR 24 HOURS FOLLOWING YOUR SURGERY.    DRINK CLEAR LIQUIDS (APPLE JUICE, GINGER ALE, 7-UP, BROTH, ETC.).  PROGRESS TO YOUR REGULAR DIET AS YOU FEEL ABLE.    YOU MAY HAVE A DRY MOUTH, A SORE THROAT, MUSCLES ACHES OR TROUBLE SLEEPING.  THESE SHOULD GO AWAY AFTER 24 HOURS.    CALL YOUR DOCTOR FOR ANY OF THE FOLLOWING:  SIGNS OF INFECTION (FEVER, GROWING TENDERNESS AT THE SURGERY SITE, A LARGE AMOUNT OF DRAINAGE OR BLEEDING, SEVERE PAIN, FOUL-SMELLING DRAINAGE, REDNESS OR SWELLING.    IT HAS BEEN OVER 8 TO 10 HOURS SINCE SURGERY AND YOU ARE STILL NOT ABLE TO URINATE (PASS WATER).     Maximum acetaminophen (Tylenol) dose from all sources should not exceed 4 grams (4000 mg) per day.    You were given Acetaminophen 650mg @ 2:00PM

## 2021-01-11 LAB — COPATH REPORT: NORMAL

## 2021-02-16 ENCOUNTER — IMMUNIZATION (OUTPATIENT)
Dept: NURSING | Facility: CLINIC | Age: 76
End: 2021-02-16
Payer: MEDICARE

## 2021-02-16 PROCEDURE — 91300 PR COVID VAC PFIZER DIL RECON 30 MCG/0.3 ML IM: CPT

## 2021-02-16 PROCEDURE — 0001A PR COVID VAC PFIZER DIL RECON 30 MCG/0.3 ML IM: CPT

## 2021-03-09 ENCOUNTER — IMMUNIZATION (OUTPATIENT)
Dept: NURSING | Facility: CLINIC | Age: 76
End: 2021-03-09
Attending: INTERNAL MEDICINE
Payer: MEDICARE

## 2021-03-09 PROCEDURE — 0002A PR COVID VAC PFIZER DIL RECON 30 MCG/0.3 ML IM: CPT

## 2021-03-09 PROCEDURE — 91300 PR COVID VAC PFIZER DIL RECON 30 MCG/0.3 ML IM: CPT

## 2021-04-22 ENCOUNTER — MYC MEDICAL ADVICE (OUTPATIENT)
Dept: OBGYN | Facility: CLINIC | Age: 76
End: 2021-04-22

## 2021-04-28 NOTE — TELEPHONE ENCOUNTER
I left the patient a voicemail message to return my call.  I reviewed the operative report from the breast seed placement.  I can address her health concerns when I see her in the office in a couple of weeks.  If her concerns more acute I will do everything I can to work her into my schedule prior  Thank you

## 2021-05-18 ENCOUNTER — OFFICE VISIT (OUTPATIENT)
Dept: OBGYN | Facility: CLINIC | Age: 76
End: 2021-05-18
Payer: MEDICARE

## 2021-05-18 VITALS — BODY MASS INDEX: 24.13 KG/M2 | WEIGHT: 145 LBS | DIASTOLIC BLOOD PRESSURE: 82 MMHG | SYSTOLIC BLOOD PRESSURE: 136 MMHG

## 2021-05-18 DIAGNOSIS — R07.1 CHEST PAIN ON BREATHING: ICD-10-CM

## 2021-05-18 DIAGNOSIS — N60.92 ATYPICAL DUCTAL HYPERPLASIA OF LEFT BREAST: ICD-10-CM

## 2021-05-18 DIAGNOSIS — E78.00 ELEVATED CHOLESTEROL: Primary | ICD-10-CM

## 2021-05-18 PROCEDURE — 99214 OFFICE O/P EST MOD 30 MIN: CPT | Performed by: OBSTETRICS & GYNECOLOGY

## 2021-05-18 NOTE — PATIENT INSTRUCTIONS
"You can reach your [facility] Care Team any time of the day by calling [phone]. This number will put you in touch with the 24 hour nurse line if the clinic is closed.    To contact your OB/GYN Station Coordinator/Surgery Scheduler please call [phone]. This is a direct number for your care team between 8 a.m. and 4 p.m. Monday through Friday.    [facility] is open for your convenience:  Monday through Friday 8 a.m. to 6 p.m.  Closed weekends and all major holidays.  What lifestyle changes can I make to help improve my cholesterol levels?    Exercise regularly.  Exercise can raise HDL cholesterol levels and reduce levels of LDL cholesterol and triglycerides. If you haven't been exercising, try to work up to 30 minutes, 4 to 6 times a week.    Lose weight if you are overweight.  Being overweight can raise your cholesterol levels. Losing weight, even just 5 or 10 pounds, can lower your total cholesterol, LDL cholesterol and triglyceride levels.    Eat a heart-healthy diet.  Eat plenty of fresh fruits and vegetables. Fruits and vegetables are naturally low in fat. Not only do they add flavor and variety to your diet, but they are also the best source of fiber, vitamins and minerals for your body. Aim for 5 cups of fruits and vegetables every day, not counting potatoes, corn and rice. Potatoes, corn and rice count as carbohydrates.     Pick \"good\" fats over \"bad\" fats. Fat is part of a healthy diet, but you need to know the difference between \"bad\" fats and \"good\" fats. \"Bad\" fats, such as saturated and trans fats, are found in foods such as butter; coconut and palm oil; saturated or partially hydrogenated vegetable fats such as shortening and margarine; animal fats in meats; and fats in whole milk dairy products. Limit the amount of saturated fat in your diet, and avoid trans fat completely. Unsaturated fat is the \"good\" fat. Most fats in fish, vegetables, grains and tree nuts are unsaturated. Try to eat " "unsaturated fat in place of saturated fat. For example, you can use olive oil or canola oil in cooking instead of butter.     Use healthier cooking methods. Baking, broiling and roasting are the healthiest ways to prepare meat, poultry and other foods. Trim any outside fat or skin before cooking. Lean cuts can be pan-broiled or stir-fried. Use either a nonstick pan or nonstick cooking spray instead of adding fats such as butter or margarine. When eating out, ask how food is prepared. You can request that your food be baked, broiled or roasted, rather than fried.   Look for other sources of protein. Fish, dry beans, tree nuts, peas and lentils offer protein, nutrients and fiber without the cholesterol and saturated fats that meats have. Consider eating one \"meatless\" meal each week. Try substituting beans for meat in a favorite recipe, such as lasagna or chili. Snack on a handful of almonds or pecans. Soy is also an excellent source of protein. Good examples of soy include soy milk, edamame (green soy beans), tofu and soy protein shakes.     Get more fiber in your diet. Add good sources of fiber to your meals. Examples include fruits, vegetables, whole grains (such as oat bran, whole and rolled oats and barley), legumes (such as beans and peas) and nuts and seeds (such as ground flax seed). In addition to fiber, whole grains supply B-vitamins and important nutrients not found in foods made with white flour.     Eat more fish. Fish are an excellent source of omega-3 fatty acids. Wild-caught oily fish, such as salmon, tuna, mackerel and sardines, are the best sources of omega-3s, but all fish contain some amount of this beneficial fatty acid. Aim for 2 6-oz servings each week.     "

## 2021-05-18 NOTE — NURSING NOTE
"Chief Complaint   Patient presents with     Recheck Medication       Initial /82   Wt 65.8 kg (145 lb)   BMI 24.13 kg/m   Estimated body mass index is 24.13 kg/m  as calculated from the following:    Height as of 20: 1.651 m (5' 5\").    Weight as of this encounter: 65.8 kg (145 lb).  BP completed using cuff size: regular    Questioned patient about current smoking habits.  Pt. has never smoked.          The following HM Due: NONE      The following patient reported/Care Every where data was sent to:  P ABSTRACT QUALITY INITIATIVES [[id number]]        Margarita Owen CMA                 " clears

## 2021-05-19 NOTE — PROGRESS NOTES
HPI:  Alix Escobar is a 75 year old female  No LMP recorded. Patient is postmenopausal.  Sexually abstinent for contraception, who presents for evaluation of several concerns.  The patient is on hormone replacement therapy with Estrace 0.5 mg orally daily Prometrium 100 mg daily and vaginal estrogen cream on a as needed basis.  I reviewed the results of her recent mammogram and left breast biopsy showing atypical ductal hyperplasia.  We discussed the relationship of breast atypia to HRT.  I reviewed the current recommendations regarding hormone replacement therapy and after this lengthy discussion we made a decision to wean the patient off of HRT.  The patient also states that since January she notices intermittent chest pain.  It can be midsternal and sometimes slightly to the left of midline.  It does not radiate to her jaw or her left arm.  It sometimes can be associated with exercise and sometimes not.  Notices more ankle swelling.  She notices when she gets the chest pain that she can have developed some mild shortness of breath.  Shortness of breath can come at times other than when she is having the chest pain.  I reviewed her recent lipid panel results as well.  I reviewed her family history.  I also reviewed the patient's past medical history.    Past Medical History:   Diagnosis Date     Advanced directives, counseling/discussion 2011     Cervicalgia 2011     Dysphagia      Hyperlipidemia LDL goal <130 4/15/2011     Hyperlipidemia LDL goal <160 4/15/2011    High HDL     Osteoarthritides      Osteopenia 2011     Routine general medical examination at a health care facility 2011     Routine gynecological examination 2011     Umbilical hernia      Past Surgical History:   Procedure Laterality Date     BIOPSY      benign breast, right breast.     BIOPSY BREAST SEED LOCALIZATION Left 2021    Procedure: LEFT BREAST SEED LOCALIZED EXCISIONAL BIOPSY;  Surgeon: Adriana  Jam GIBSON MD;  Location: [facility] OR     BREAST SURGERY  1985    benign lump removed. Left breast.1985     COLONOSCOPY  10/1/2013    Procedure: COLONOSCOPY;  Colonoscopy ;  Surgeon: Adair Stoddard MD;  Location:  GI     EXCISE MASS UPPER EXTREMITY  5/20/2013    Procedure: EXCISE MASS UPPER EXTREMITY;  Remove Mass Right Upper Arm;  Surgeon: Elvira Wilson MD;  Location: [facility] OR     EYE SURGERY      lasik     wisdom teeth       Family History   Problem Relation Age of Onset     Cerebrovascular Disease Father         parkinsons     C.A.D. Mother      Coronary Artery Disease Mother         100 years old, CHF     Unknown/Adopted Maternal Grandmother      Hypertension Maternal Grandfather      Hypertension Paternal Grandfather      Blood Disease Sister      Neurologic Disorder Sister         epilepsy     Arthritis Other         neck and thumb joints     Social History     Socioeconomic History     Marital status:      Spouse name: Not on file     Number of children: Not on file     Years of education: Not on file     Highest education level: Not on file   Occupational History     Not on file   Social Needs     Financial resource strain: Not on file     Food insecurity     Worry: Not on file     Inability: Not on file     Transportation needs     Medical: Not on file     Non-medical: Not on file   Tobacco Use     Smoking status: Never Smoker     Smokeless tobacco: Never Used   Substance and Sexual Activity     Alcohol use: No     Drug use: No     Sexual activity: Not Currently     Partners: Male   Lifestyle     Physical activity     Days per week: Not on file     Minutes per session: Not on file     Stress: Not on file   Relationships     Social connections     Talks on phone: Not on file     Gets together: Not on file     Attends [employer] service: Not on file     Active member of club or organization: Not on file     Attends meetings of clubs or organizations: Not on file     Relationship status: Not on file      Intimate partner violence     Fear of current or ex partner: Not on file     Emotionally abused: Not on file     Physically abused: Not on file     Forced sexual activity: Not on file   Other Topics Concern     Parent/sibling w/ CABG, MI or angioplasty before 65F 55M? No   Social History Narrative     Not on file       Allergies:  Seasonal allergies    Current Outpatient Medications   Medication Sig Dispense Refill     calcium citrate-vitamin D (CITRACAL) 315-200 MG-UNIT TABS Take 1 tablet by mouth daily       estradiol (ESTRACE) 0.1 MG/GM vaginal cream Place 2 g vaginally twice a week 70 g 8     estradiol (ESTRACE) 0.5 MG tablet TAKE 1 TABLET BY MOUTH ONCE DAILY 30 tablet 6     multivitamin, therapeutic with minerals (MULTI-VITAMIN) TABS Take 1 tablet by mouth daily       naproxen sodium (ANAPROX) 220 MG tablet Take 220 mg by mouth 2 times daily (with meals)       oxyCODONE (ROXICODONE) 5 MG tablet Take 1-2 tablets (5-10 mg) by mouth every 4 hours as needed for moderate to severe pain 6 tablet 0     progesterone (PROMETRIUM) 100 MG capsule TAKE 1 CAPSULE(100 MG) BY MOUTH DAILY 90 capsule 1       Review Of Systems   ROS: 10 point ROS neg other than the symptoms noted above in the HPI.    Exam:  /82   Wt 65.8 kg (145 lb)   BMI 24.13 kg/m    {Constitutional: healthy, alert and no distress  Head: Normocephalic. No masses, lesions, tenderness or abnormalities  Neck: Neck supple. No adenopathy. Thyroid symmetric, normal size,, Carotids without bruits.  Cardiovascular: negative, PMI normal. No lifts, heaves, or thrills. RRR. No murmurs, clicks gallops or rub  Respiratory: negative, Percussion normal. Good diaphragmatic excursion. Lungs clear  Gastrointestinal: Abdomen soft, non-tender. BS normal. No masses, organomegaly    Assessment/Plan:  (E78.00) Elevated cholesterol  (primary encounter diagnosis)  Comment: I did review her lipid panel in light of her chest pain I believe her low-fat low-cholesterol diet.   I like to recheck a fasting lipid panel in 3 months to reassess.  A detailed written plan was given  Plan: Lipid panel reflex to direct LDL Fasting        Patient will schedule appointment    (R07.1) Chest pain on breathing  Comment: On her chest pain is not typical angina I did discuss with her the importance of having it evaluated.  I have asked that she see internal medicine at her earliest convenience.  She will schedule appointment with the  before she leaves today  Plan: Emergency helpline numbers reviewed.  I did not see any obvious cause on clinical exam today.  I did discuss a possible work-up that internal medicine will defer evaluation of her symptoms.  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  We discussed the need for follow-up with breast disease.  Plan: Regarding hormone replacement therapy.  I have asked that she stop the vaginal estrogen and that she decrease Estrace to 0.5 mg orally every other day and the Prometrium 100 mg days 1 through 10 of each the next 3 months.  She will call for any bleeding.  We will see her back in 3 months for follow-up.  At that point I would anticipate stopping the HRT and observing her.  A detailed written plan was given      Juan Jose Levy M.D.

## 2021-05-26 ENCOUNTER — ANCILLARY PROCEDURE (OUTPATIENT)
Dept: GENERAL RADIOLOGY | Facility: CLINIC | Age: 76
End: 2021-05-26
Attending: INTERNAL MEDICINE
Payer: MEDICARE

## 2021-05-26 ENCOUNTER — OFFICE VISIT (OUTPATIENT)
Dept: INTERNAL MEDICINE | Facility: CLINIC | Age: 76
End: 2021-05-26
Payer: MEDICARE

## 2021-05-26 VITALS
HEART RATE: 86 BPM | TEMPERATURE: 98.6 F | OXYGEN SATURATION: 99 % | SYSTOLIC BLOOD PRESSURE: 160 MMHG | BODY MASS INDEX: 24.04 KG/M2 | HEIGHT: 65 IN | DIASTOLIC BLOOD PRESSURE: 93 MMHG | WEIGHT: 144.31 LBS

## 2021-05-26 DIAGNOSIS — R53.83 FATIGUE, UNSPECIFIED TYPE: ICD-10-CM

## 2021-05-26 DIAGNOSIS — R07.9 CHEST PAIN, UNSPECIFIED TYPE: Primary | ICD-10-CM

## 2021-05-26 DIAGNOSIS — R07.9 CHEST PAIN, UNSPECIFIED TYPE: ICD-10-CM

## 2021-05-26 DIAGNOSIS — R03.0 ELEVATED BP WITHOUT DIAGNOSIS OF HYPERTENSION: ICD-10-CM

## 2021-05-26 LAB
ERYTHROCYTE [DISTWIDTH] IN BLOOD BY AUTOMATED COUNT: 13.4 % (ref 10–15)
HCT VFR BLD AUTO: 39.1 % (ref 35–47)
HGB BLD-MCNC: 12.9 G/DL (ref 11.7–15.7)
MCH RBC QN AUTO: 30.1 PG (ref 26.5–33)
MCHC RBC AUTO-ENTMCNC: 33 G/DL (ref 31.5–36.5)
MCV RBC AUTO: 91 FL (ref 78–100)
PLATELET # BLD AUTO: 268 10E9/L (ref 150–450)
RBC # BLD AUTO: 4.29 10E12/L (ref 3.8–5.2)
WBC # BLD AUTO: 7 10E9/L (ref 4–11)

## 2021-05-26 PROCEDURE — 85379 FIBRIN DEGRADATION QUANT: CPT | Performed by: INTERNAL MEDICINE

## 2021-05-26 PROCEDURE — 80061 LIPID PANEL: CPT | Performed by: INTERNAL MEDICINE

## 2021-05-26 PROCEDURE — 84443 ASSAY THYROID STIM HORMONE: CPT | Performed by: INTERNAL MEDICINE

## 2021-05-26 PROCEDURE — 93000 ELECTROCARDIOGRAM COMPLETE: CPT | Performed by: INTERNAL MEDICINE

## 2021-05-26 PROCEDURE — 71046 X-RAY EXAM CHEST 2 VIEWS: CPT | Performed by: RADIOLOGY

## 2021-05-26 PROCEDURE — 36415 COLL VENOUS BLD VENIPUNCTURE: CPT | Performed by: INTERNAL MEDICINE

## 2021-05-26 PROCEDURE — 99204 OFFICE O/P NEW MOD 45 MIN: CPT | Performed by: INTERNAL MEDICINE

## 2021-05-26 PROCEDURE — 80053 COMPREHEN METABOLIC PANEL: CPT | Performed by: INTERNAL MEDICINE

## 2021-05-26 PROCEDURE — 85027 COMPLETE CBC AUTOMATED: CPT | Performed by: INTERNAL MEDICINE

## 2021-05-26 ASSESSMENT — MIFFLIN-ST. JEOR: SCORE: 1150.48

## 2021-05-26 NOTE — PROGRESS NOTES
"        Assessment & Plan     Chest pain, unspecified type  Assess lab, CXR, EKG, stress test   - CBC with platelets  - Comprehensive metabolic panel  - TSH with free T4 reflex  - D dimer, quantitative  - XR Chest 2 Views; Future  - Lipid panel reflex to direct LDL Non-fasting  - Echocardiogram Exercise Stress; Future    Elevated BP without diagnosis of hypertension  Keep low salt diet, recheck in 1 month   - CBC with platelets  - Comprehensive metabolic panel  - TSH with free T4 reflex  - D dimer, quantitative  - XR Chest 2 Views; Future  - Lipid panel reflex to direct LDL Non-fasting  - Echocardiogram Exercise Stress; Future    Fatigue, unspecified type  Assess lab work   - TSH with free T4 reflex  - Echocardiogram Exercise Stress; Future             See Patient Instructions    Return in about 4 weeks (around 6/23/2021) for follow up on acute problem if persists.    Rustam Horton MD  [facility] is a 75 year old who presents for the following health issues     HPI   Chief Complaint   Patient presents with     Fatigue     Tiredness/low energy     Chest Pain     Intermittent chest pain, mild SOB          New patient seen for assessment of fatigue, CP.     Has had chest pain, for 1 month. Sometimes with exercise, sometimes not. In the breast bone. Lasts a minute , mild SOB, occasional palpitations.   Has family history of CHF, stroke   Has had feet and ankles edema for 6 months.     Has had feeling of tiredness. Occasional CP at night.   Weight is stable.   Normal appetite.         Review of Systems   Constitutional, HEENT, cardiovascular, pulmonary, gi and gu systems are negative, except as otherwise noted.      Objective    BP (!) 160/93 (BP Location: Right arm, Patient Position: Sitting, Cuff Size: Adult Regular)   Pulse 86   Temp 98.6  F (37  C) (Oral)   Ht 1.651 m (5' 5\")   Wt 65.5 kg (144 lb 5 oz)   SpO2 99%   BMI 24.01 kg/m    Body mass index is 24.01 " "kg/m .  Physical Exam   GENERAL: healthy, alert and no distress  EYES: Eyes grossly normal to inspection, PERRL and conjunctivae and sclerae normal  HENT: ear canals and TM's normal, nose and mouth without ulcers or lesions  NECK: no adenopathy, no asymmetry, masses, or scars and thyroid normal to palpation  RESP: lungs clear to auscultation - no rales, rhonchi or wheezes  CV: regular rate and rhythm, normal S1 S2, no S3 or S4, no murmur, click or rub, no peripheral edema and peripheral pulses strong  ABDOMEN: soft, nontender, no hepatosplenomegaly, no masses and bowel sounds normal  MS: no gross musculoskeletal defects noted, no edema  SKIN: no suspicious lesions or rashes  NEURO: Normal strength and tone, mentation intact and speech normal    Admission on 01/07/2021, Discharged on 01/07/2021   Component Date Value Ref Range Status     Copath Report 01/07/2021    Final                    Value:Patient Name: ЮЛИЯ DAVIDSON  MR#: [id number]  Specimen #:   Collected: 1/7/2021  Received: 1/7/2021  Reported: 1/11/2021 14:19  Ordering Phy(s): JACEY FOFANA    For improved result formatting, select 'View Enhanced Report Format' under   Linked Documents section.    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    CLINICAL HISTORY:  Radial scar of left breast.    GROSS:  The specimen is received in formalin labeled with the patient's name,   identifying information and designated  \"left breast seed localized biopsy\".  It consists of an 18 g friable left   breast excisional biopsy, measuring  5 cm (anterior to posterior), ranging from 2-3 cm (superior to inferior),   and ranging from 1.5-1.7 cm (medial  to lateral).  The specimen is                           oriented as " follows: Short stitch superior   and at secondary biopsy site, long  stitch-lateral.  The specimen is inked as follows: Anterior-green,   posterior-black, superior-Violet,  inferior-blue, medial-yellow lateral-orange.  The biopsy site along with   clip and gelatinous material on the  exposed (superior aspect).  The specimen is serially sectioned into 10   slices to reveal a 0.4 cm grey-white,  stellate lesion with a 1 cm nearby biopsy cavity.  The lesion is occupies   slices 5 and 6, and is located 0.7  cm from Lash[place] inked superior margin, 1 cm or greater from all other   margins.  The biopsy cavity abuts the  superior margin, and is 0.4 cm from medial margin, 0.9 cm or greater from   all other margins.  The remaining  cut surfaces consist of 75% adipose tissue.  Also within the container is   a 1 cm fragment of loose adipose  tissue.  This fragment is entirely inked blue.  Entirely submitted.    Time collected: 12:42; times in formalin: 13:05    Summary of Sections:  A1-                          A2 - slice #1, anterior margin, perpendicularly sectioned.  3-4 - slice #2.  5 - slice #3  6-7 - slice #4  8-9 - slice #5 with lesion.  10-11 - slice #6 with lesion and biopsy cavity.  12 - slice #7.  13 - slice #8.  14 - slice #9.  15 - slice #10, posterior margin, perpendicularly sectioned.  16 - fragments of adipose tissue found floating loose within the   container.    Per Epic, the radiographic findings are as follows:  Imaging reviewed: Ultrasound.  Findings: Radial scar at 11:30; small mass at 12:00.  Expected clip/marker: Undesignated.  Do gross findings correlate?:  Yes. (Dictated by: NOHEMY Moreau   1/7/2021 03:04 PM)    MICROSCOPIC:  A formal microscopic exam is performed. Specimen shows a complex   sclerosing lesion associated with previous  biopsy cavity. Focus of atypical ductal hyperplasia is present (confirmed   with ER & cytokeratin 5/6  immunostain) Margins are negative for atypia. Specimen is  negative for   malignancy.  Representative slides are seen in intradepa                          rtmental consultation    P63 immunostain is performed and is intact in the tubules.    The technical component of this testing was completed at the [facility], with the professional component performed   at the [facility], [address], [place]  [address]   ([phone])    CPT Codes:  A: 35212-UN0, 39791-HVM, 13233-TP, 89013-NRU    COLLECTION SITE:  Client: [facility]  Location: [facility])

## 2021-05-27 LAB
ALBUMIN SERPL-MCNC: 4 G/DL (ref 3.4–5)
ALP SERPL-CCNC: 67 U/L (ref 40–150)
ALT SERPL W P-5'-P-CCNC: 20 U/L (ref 0–50)
ANION GAP SERPL CALCULATED.3IONS-SCNC: 3 MMOL/L (ref 3–14)
AST SERPL W P-5'-P-CCNC: 21 U/L (ref 0–45)
BILIRUB SERPL-MCNC: 0.3 MG/DL (ref 0.2–1.3)
BUN SERPL-MCNC: 20 MG/DL (ref 7–30)
CALCIUM SERPL-MCNC: 9.2 MG/DL (ref 8.5–10.1)
CHLORIDE SERPL-SCNC: 105 MMOL/L (ref 94–109)
CHOLEST SERPL-MCNC: 213 MG/DL
CO2 SERPL-SCNC: 30 MMOL/L (ref 20–32)
CREAT SERPL-MCNC: 0.98 MG/DL (ref 0.52–1.04)
D DIMER PPP FEU-MCNC: <0.3 UG/ML FEU (ref 0–0.5)
GFR SERPL CREATININE-BSD FRML MDRD: 56 ML/MIN/{1.73_M2}
GLUCOSE SERPL-MCNC: 85 MG/DL (ref 70–99)
HDLC SERPL-MCNC: 80 MG/DL
LDLC SERPL CALC-MCNC: 113 MG/DL
NONHDLC SERPL-MCNC: 133 MG/DL
POTASSIUM SERPL-SCNC: 4 MMOL/L (ref 3.4–5.3)
PROT SERPL-MCNC: 7.4 G/DL (ref 6.8–8.8)
SODIUM SERPL-SCNC: 138 MMOL/L (ref 133–144)
TRIGL SERPL-MCNC: 99 MG/DL
TSH SERPL DL<=0.005 MIU/L-ACNC: 2.16 MU/L (ref 0.4–4)

## 2021-06-07 ENCOUNTER — HOSPITAL ENCOUNTER (OUTPATIENT)
Dept: CARDIOLOGY | Facility: CLINIC | Age: 76
Discharge: HOME | End: 2021-06-07
Attending: INTERNAL MEDICINE | Admitting: INTERNAL MEDICINE
Payer: MEDICARE

## 2021-06-07 DIAGNOSIS — R03.0 ELEVATED BP WITHOUT DIAGNOSIS OF HYPERTENSION: ICD-10-CM

## 2021-06-07 DIAGNOSIS — R53.83 FATIGUE, UNSPECIFIED TYPE: ICD-10-CM

## 2021-06-07 DIAGNOSIS — R07.9 CHEST PAIN, UNSPECIFIED TYPE: ICD-10-CM

## 2021-06-07 PROCEDURE — 999N000208 ECHO STRESS ECHOCARDIOGRAM

## 2021-06-07 PROCEDURE — 255N000002 HC RX 255 OP 636: Performed by: INTERNAL MEDICINE

## 2021-06-07 PROCEDURE — 93350 STRESS TTE ONLY: CPT | Mod: 26 | Performed by: INTERNAL MEDICINE

## 2021-06-07 PROCEDURE — 93016 CV STRESS TEST SUPVJ ONLY: CPT | Performed by: INTERNAL MEDICINE

## 2021-06-07 PROCEDURE — 93321 DOPPLER ECHO F-UP/LMTD STD: CPT | Mod: 26 | Performed by: INTERNAL MEDICINE

## 2021-06-07 PROCEDURE — 93325 DOPPLER ECHO COLOR FLOW MAPG: CPT | Mod: 26 | Performed by: INTERNAL MEDICINE

## 2021-06-07 PROCEDURE — 93018 CV STRESS TEST I&R ONLY: CPT | Performed by: INTERNAL MEDICINE

## 2021-06-07 RX ADMIN — HUMAN ALBUMIN MICROSPHERES AND PERFLUTREN 3 ML: 10; .22 INJECTION, SOLUTION INTRAVENOUS at 09:22

## 2021-06-24 SDOH — ECONOMIC STABILITY: INCOME INSECURITY: IN THE LAST 12 MONTHS, WAS THERE A TIME WHEN YOU WERE NOT ABLE TO PAY THE MORTGAGE OR RENT ON TIME?: NO

## 2021-06-25 ENCOUNTER — OFFICE VISIT (OUTPATIENT)
Dept: FAMILY MEDICINE | Facility: CLINIC | Age: 76
End: 2021-06-25
Payer: MEDICARE

## 2021-06-25 VITALS
OXYGEN SATURATION: 99 % | SYSTOLIC BLOOD PRESSURE: 137 MMHG | WEIGHT: 146.3 LBS | BODY MASS INDEX: 24.35 KG/M2 | HEART RATE: 66 BPM | RESPIRATION RATE: 14 BRPM | TEMPERATURE: 97.8 F | DIASTOLIC BLOOD PRESSURE: 84 MMHG

## 2021-06-25 DIAGNOSIS — H25.89 OTHER AGE-RELATED CATARACT OF BOTH EYES: ICD-10-CM

## 2021-06-25 DIAGNOSIS — H61.23 BILATERAL IMPACTED CERUMEN: ICD-10-CM

## 2021-06-25 DIAGNOSIS — Z01.818 PREOP GENERAL PHYSICAL EXAM: Primary | ICD-10-CM

## 2021-06-25 PROCEDURE — 69209 REMOVE IMPACTED EAR WAX UNI: CPT | Performed by: NURSE PRACTITIONER

## 2021-06-25 PROCEDURE — 99213 OFFICE O/P EST LOW 20 MIN: CPT | Mod: 25 | Performed by: NURSE PRACTITIONER

## 2021-06-25 NOTE — PROGRESS NOTES
[facility]  [id number] [place] [id number]  Phone: [phone]  Primary Provider: Danielle Borrero  Pre-op Performing Provider: FRANCK MADRIGAL      PREOPERATIVE EVALUATION:  Today's date: 6/25/2021    Alix Escobar is a 76 year old female who presents for a preoperative evaluation.    Surgical Information:  Surgery/Procedure: Cataract, bilateral   Surgery Location: [facility]  Surgeon: Toni  Surgery Date: 07/01/2021 and 07/28/2021  Time of Surgery: 9:00  Where patient plans to recover: At home with family  Fax number for surgical facility:     Type of Anesthesia Anticipated: Local with MAC    Assessment & Plan     The proposed surgical procedure is considered LOW risk.    Preop general physical exam  Other age-related cataract of both eyes  Clearance as below.    Bilateral impacted cerumen  Tolerated well, most cerumen removed. Recommended Debrox for residual.   - HI REMOVAL IMPACTED CERUMEN IRRIGATION/LVG UNILAT         Risks and Recommendations:  The patient has the following additional risks and recommendations for perioperative complications:   - No identified additional risk factors other than previously addressed    Medication Instructions:  patient will hold medications day of procedure    RECOMMENDATION:  APPROVAL GIVEN to proceed with proposed procedure, without further diagnostic evaluation.                      Subjective     HPI related to upcoming procedure: bilateral IOL placement for treatment of cataracts     Preop Questions 6/24/2021   1. Have you ever had a heart attack or stroke? No   2. Have you ever had surgery on your heart or blood vessels, such as a stent placement, a coronary artery bypass, or surgery on an artery in your head, neck, heart, or legs? No   3. Do you have chest pain with activity? No   4. Do you have a history of  heart failure? No   5. Do you currently have a cold, bronchitis or symptoms of other infection? No   6. Do you have a  cough, shortness of breath, or wheezing? No   7. Do you or anyone in your family have previous history of blood clots? No   8. Do you or does anyone in your family have a serious bleeding problem such as prolonged bleeding following surgeries or cuts? No   9. Have you ever had problems with anemia or been told to take iron pills? YES - resolved, premenopausal.    10. Have you had any abnormal blood loss such as black, tarry or bloody stools, or abnormal vaginal bleeding? No   11. Have you ever had a blood transfusion? No   12. Are you willing to have a blood transfusion if it is medically needed before, during, or after your surgery? Yes   13. Have you or any of your relatives ever had problems with anesthesia? No   14. Do you have sleep apnea, excessive snoring or daytime drowsiness? No   15. Do you have any artifical heart valves or other implanted medical devices like a pacemaker, defibrillator, or continuous glucose monitor? No   16. Do you have artificial joints? No   17. Are you allergic to latex? No       Health Care Directive:  Patient has a Health Care Directive on file      Preoperative Review of :   reviewed - controlled substances prescribed by other outside provider(s).      Status of Chronic Conditions:  See problem list for active medical problems.  Problems all longstanding and stable, except as noted/documented.  See ROS for pertinent symptoms related to these conditions.      Review of Systems  CONSTITUTIONAL: NEGATIVE for fever, chills, change in weight  INTEGUMENTARY/SKIN: NEGATIVE for worrisome rashes, moles or lesions  EYES: NEGATIVE for vision changes or irritation  ENT/MOUTH: NEGATIVE for ear, mouth and throat problems  RESP: NEGATIVE for significant cough or SOB  BREAST: NEGATIVE for masses, tenderness or discharge  CV: NEGATIVE for chest pain, palpitations or peripheral edema  GI: NEGATIVE for nausea, abdominal pain, heartburn, or change in bowel habits  : NEGATIVE for frequency,  dysuria, or hematuria  MUSCULOSKELETAL: NEGATIVE for significant arthralgias or myalgia  NEURO: NEGATIVE for weakness, dizziness or paresthesias  ENDOCRINE: NEGATIVE for temperature intolerance, skin/hair changes  HEME: NEGATIVE for bleeding problems  PSYCHIATRIC: NEGATIVE for changes in mood or affect    Patient Active Problem List    Diagnosis Date Noted     Radial scar of left breast 11/16/2020     Priority: Medium     Added automatically from request for surgery [id number]       Gastroesophageal reflux disease, esophagitis presence not specified 04/25/2018     Priority: Medium     IMO Regulatory Load OCT 2020       Onychomycosis 04/25/2018     Priority: Medium     Need for prophylactic hormone replacement therapy (postmenopausal) 03/25/2014     Priority: Medium     Routine general medical examination at a health care facility 09/20/2011     Priority: Medium     Encounter for routine gynecological examination 09/20/2011     Priority: Medium     Problem list name updated by automated process. Provider to review       Osteopenia 09/20/2011     Priority: Medium     Cervicalgia 08/08/2011     Priority: Medium     Advance Care Planning 06/29/2011     Priority: Medium     Advance Care Planning:   Receipt of ACP document:  Received: Health Care Directive which was witnessed or notarized on 2/24/15.  Document not previously scanned.  Validation form completed and sent with document to be scanned.  Code Status reflects choices in most recent ACP document.  Confirmed/documented designated decision maker(s). See permanent comments section of demographics in clinical tab. View document(s) and details by clicking on code status.   Added by Elio Uribe on 2/24/2015.      Advance Care Planning: Initial facilitation introduction:   Alix Escobar presented for initial session regarding ACP at a group session. She was accompanied by spouse. [employer] information provided and resources reviewed. She currently wishes to  revise an existing ACP document.  She currently has the following questions or concerns about Advance Care Planning: none.  Confirmed/documented designated decision maker(s). See permanent comments section of demographics in clinical tab. Added by Omayra Cardenas on 1/21/2015  Advance Care Planning: Receipt of ACP document:  Received: Health Care Directive which was witnessed or notarized on 10-2-00.  Document previously scanned on 5-21-13.  Validation form completed and sent to be scanned.  Code Status needs to be updated to reflect choices.  Confirmed/documented designated decision maker(s). See permanent comments section of demographics in clinical tab. View document(s) and details by clicking on code status. Added by Radha Aguilar RN, System ACP Coordinator on 6/5/2013.  Advance Directive Problem List Overview:  Patient states has Advance Directive and will bring in a copy to clinic. 6/29/2011          Hyperlipidemia LDL goal <160 04/15/2011     Priority: Medium     Calculated ASCVD risk - 10.3%        Past Medical History:   Diagnosis Date     Advanced directives, counseling/discussion 6/29/2011     Cervicalgia 8/8/2011     Dysphagia      Hyperlipidemia LDL goal <130 4/15/2011     Hyperlipidemia LDL goal <160 4/15/2011    High HDL     Osteoarthritides      Osteopenia 9/20/2011     Routine general medical examination at a health care facility 9/20/2011     Routine gynecological examination 9/20/2011     Umbilical hernia      Past Surgical History:   Procedure Laterality Date     BIOPSY      benign breast, right breast.2003     BIOPSY BREAST SEED LOCALIZATION Left 1/7/2021    Procedure: LEFT BREAST SEED LOCALIZED EXCISIONAL BIOPSY;  Surgeon: Jam Wright MD;  Location: [facility] OR     BREAST SURGERY  1985    benign lump removed. Left breast.1985     COLONOSCOPY  10/1/2013    Procedure: COLONOSCOPY;  Colonoscopy ;  Surgeon: Adair Stoddard MD;  Location:  GI     EXCISE MASS UPPER EXTREMITY  5/20/2013     Procedure: EXCISE MASS UPPER EXTREMITY;  Remove Mass Right Upper Arm;  Surgeon: Elvira Wilson MD;  Location: [facility] OR     EYE SURGERY      lasik     wisdom teeth       Current Outpatient Medications   Medication Sig Dispense Refill     BLACK COHOSH HOT FLASH RELIEF PO Take 3 tablets by mouth daily       calcium citrate-vitamin D (CITRACAL) 315-200 MG-UNIT TABS Take 1 tablet by mouth daily       Capsicum, Cayenne, (CAYENNE PO) Take 2 tablets by mouth daily       co-enzyme Q-10 50 MG CAPS Take 1 tablet by mouth daily       estradiol (ESTRACE) 0.1 MG/GM vaginal cream Place 2 g vaginally twice a week (Patient not taking: Reported on 5/26/2021) 70 g 8     multivitamin, therapeutic with minerals (MULTI-VITAMIN) TABS Take 1 tablet by mouth daily       naproxen sodium (ANAPROX) 220 MG tablet Take 220 mg by mouth 2 times daily (with meals)       progesterone (PROMETRIUM) 100 MG capsule TAKE 1 CAPSULE(100 MG) BY MOUTH DAILY (Patient taking differently: 1 tablet daily for 10 days at the beginning of the month) 90 capsule 1       Allergies   Allergen Reactions     Seasonal Allergies         Social History     Tobacco Use     Smoking status: Never Smoker     Smokeless tobacco: Never Used   Substance Use Topics     Alcohol use: No     Frequency: Never     Family History   Problem Relation Age of Onset     Cerebrovascular Disease Father         parkinsons     C.A.D. Mother      Coronary Artery Disease Mother         100 years old, CHF     Unknown/Adopted Maternal Grandmother      Hypertension Maternal Grandfather      Hypertension Paternal Grandfather      Blood Disease Sister      Neurologic Disorder Sister         epilepsy     Arthritis Other         neck and thumb joints     History   Drug Use No         Objective     /84 (BP Location: Right arm, Patient Position: Chair, Cuff Size: Adult Regular)   Pulse 66   Temp 97.8  F (36.6  C) (Oral)   Resp 14   Wt 66.4 kg (146 lb 4.8 oz)   SpO2 99%   BMI 24.35 kg/m       Physical Exam    GENERAL APPEARANCE: healthy, alert and no distress     EYES: EOMI, PERRL     HENT: nose and mouth without ulcers or lesions and cerumen bilateral     NECK: no adenopathy, no asymmetry, masses, or scars and thyroid normal to palpation     RESP: lungs clear to auscultation - no rales, rhonchi or wheezes     CV: regular rates and rhythm, normal S1 S2, no S3 or S4 and no murmur, click or rub     ABDOMEN:  soft, nontender, no HSM or masses and bowel sounds normal     MS: extremities normal- no gross deformities noted, no evidence of inflammation in joints, FROM in all extremities.     SKIN: no suspicious lesions or rashes     NEURO: Normal strength and tone, sensory exam grossly normal, mentation intact and speech normal     PSYCH: mentation appears normal. and affect normal/bright     LYMPHATICS: No cervical adenopathy    Recent Labs   Lab Test 05/26/21  1606 12/18/20  1112   HGB 12.9 13.9     --      --    POTASSIUM 4.0  --    CR 0.98  --         Diagnostics:  No labs were ordered during this visit.   No EKG required for low risk surgery (cataract, skin procedure, breast biopsy, etc).    Revised Cardiac Risk Index (RCRI):  The patient has the following serious cardiovascular risks for perioperative complications:   - No serious cardiac risks = 0 points     RCRI Interpretation: 0 points: Class I (very low risk - 0.4% complication rate)           Signed Electronically by: MAMADOU Cornelius CNP  Copy of this evaluation report is provided to requesting physician.

## 2021-06-25 NOTE — PATIENT INSTRUCTIONS

## 2021-08-16 ENCOUNTER — NURSE TRIAGE (OUTPATIENT)
Dept: NURSING | Facility: CLINIC | Age: 76
End: 2021-08-16

## 2021-08-16 NOTE — TELEPHONE ENCOUNTER
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 Patient not wanting to triage symptoms, wanting COVID testing. Advised per [facility] [place] protocol a virtual visit with a provider would be needed with patient declining virtual visit. Advised of the [facility] website with community testing options with the patient to seek testing site in the community.     Renee Woodward RN 08/16/21 1:15 PM    Health Triage Nurse Advisor    Reason for Disposition    COVID-19 Testing, questions about    Additional Information    Negative: SEVERE difficulty breathing (e.g., struggling for each breath, speaks in single words)    Negative: Difficult to awaken or acting confused (e.g., disoriented, slurred speech)    Negative: Bluish (or gray) lips or face now    Negative: Shock suspected (e.g., cold/pale/clammy skin, too weak to stand, low BP, rapid pulse)    Negative: Sounds like a life-threatening emergency to the triager    Negative: [1] COVID-19 exposure AND [2] has not completed COVID-19 vaccine series AND [3] no symptoms    Negative: [1] COVID-19 exposure AND [2] completed COVID-19 vaccine series (fully vaccinated) AND [3] no symptoms    Negative: COVID-19 vaccine reaction suspected (e.g., fever, headache, muscle aches) occurring during days 1-3 after getting vaccine    Negative: COVID-19 vaccine, questions about    Negative: [1] COVID-19 vaccine series completed (fully vaccinated) in past 3 months AND [2] new-onset of COVID-19 symptoms BUT [3] no known exposure    Negative: [1] Had lab test confirmed COVID-19 infection within last 3 monthsAND [2] new-onset of COVID-19 symptoms BUT [3] no known exposure    Negative: [1] Lives with someone known to have influenza (flu test positive) AND [2] flu-like symptoms (e.g., cough, runny nose, sore throat, SOB; with or without fever)    Negative: [1] Adult with possible COVID-19 symptoms AND [2] triager  concerned about severity of symptoms or other causes    Negative: COVID-19 and breastfeeding, questions about    Negative: SEVERE or constant chest pain or pressure (Exception: mild central chest pain, present only when coughing)    Negative: MODERATE difficulty breathing (e.g., speaks in phrases, SOB even at rest, pulse 100-120)    Negative: [1] Headache AND [2] stiff neck (can't touch chin to chest)    Negative: MILD difficulty breathing (e.g., minimal/no SOB at rest, SOB with walking, pulse <100)    Negative: Chest pain or pressure    Negative: Patient sounds very sick or weak to the triager    Negative: Fever > 103 F (39.4 C)    Negative: [1] Fever > 101 F (38.3 C) AND [2] age > 60 years    Negative: [1] Fever > 100.0 F (37.8 C) AND [2] bedridden (e.g., nursing home patient, CVA, chronic illness, recovering from surgery)    Negative: [1] HIGH RISK patient (e.g., age > 64 years, diabetes, heart or lung disease, weak immune system) AND [2] new or worsening symptoms    Negative: [1] HIGH RISK patient AND [2] influenza is widespread in the community AND [3] ONE OR MORE respiratory symptoms: cough, sore throat, runny or stuffy nose    Negative: [1] HIGH RISK patient AND [2] influenza exposure within the last 7 days AND [3] ONE OR MORE respiratory symptoms: cough, sore throat, runny or stuffy nose    Negative: Fever present > 3 days (72 hours)    Negative: [1] Fever returns after gone for over 24 hours AND [2] symptoms worse or not improved    Negative: [1] Continuous (nonstop) coughing interferes with work or school AND [2] no improvement using cough treatment per protocol    Negative: [1] COVID-19 infection suspected by caller or triager AND [2] mild symptoms (cough, fever, or others) AND [3] no complications or SOB    Negative: Cough present > 3 weeks    Negative: COVID-19 Home Isolation, questions about    Negative: [1] COVID-19 diagnosed AND [2] has mild nausea, vomiting or diarrhea    Negative: [1] COVID-19  diagnosed by HCP (doctor, NP or PA) AND [2] mild symptoms (e.g., cough, fever, others) AND [3] no complications or SOB    Negative: [1] COVID-19 diagnosed by positive lab test AND [2] mild symptoms (e.g., cough, fever, others) AND [3] no complications or SOB    Negative: [1] COVID-19 diagnosed by positive lab test AND [2] NO symptoms (e.g., cough, fever, others)    Protocols used: CORONAVIRUS (COVID-19) DIAGNOSED OR [id number]-UO 3.25

## 2021-08-23 ENCOUNTER — OFFICE VISIT (OUTPATIENT)
Dept: OBGYN | Facility: CLINIC | Age: 76
End: 2021-08-23
Payer: MEDICARE

## 2021-08-23 VITALS — BODY MASS INDEX: 24.13 KG/M2 | WEIGHT: 145 LBS | DIASTOLIC BLOOD PRESSURE: 80 MMHG | SYSTOLIC BLOOD PRESSURE: 128 MMHG

## 2021-08-23 DIAGNOSIS — N95.1 MENOPAUSAL SYNDROME (HOT FLASHES): Primary | ICD-10-CM

## 2021-08-23 PROCEDURE — 99213 OFFICE O/P EST LOW 20 MIN: CPT | Performed by: OBSTETRICS & GYNECOLOGY

## 2021-08-23 NOTE — PROGRESS NOTES
Alix Escobar is a 76 year old female  No LMP recorded. Patient is postmenopausal.  Sexually abstinent for contraception, who presents for a follow-up of the previous visit of 2021 when we discussed hormone replacement therapy.  At the time of that visit she had been on Estrace 0.5 mg daily as well as Prometrium 100 mg daily and vaginal estrogen cream on an as-needed basis.  She has since decreased the estrogen to every other day and stop the vaginal supplement altogether.  She has had no vaginal bleeding.  Patient is done well on this combination is feeling fine.  She was recently in the [employer] production that went very well and she found this very little warning.  I discussed with her today at length hormone replacement therapy and the current recommendations.  At this time would like her to stop the hormone replacement therapy and see how she does.  She still does have a small amount left at home in her medicine cabinet.    Past Medical History:   Diagnosis Date     Advanced directives, counseling/discussion 2011     Cervicalgia 2011     Dysphagia      Hyperlipidemia LDL goal <130 4/15/2011     Hyperlipidemia LDL goal <160 4/15/2011    High HDL     Osteoarthritides      Osteopenia 2011     Routine general medical examination at a health care facility 2011     Routine gynecological examination 2011     Umbilical hernia      Current Outpatient Medications   Medication     BLACK COHOSH HOT FLASH RELIEF PO     calcium citrate-vitamin D (CITRACAL) 315-200 MG-UNIT TABS     Capsicum, Cayenne, (CAYENNE PO)     co-enzyme Q-10 50 MG CAPS     estradiol (ESTRACE) 0.5 MG tablet     multivitamin, therapeutic with minerals (MULTI-VITAMIN) TABS     naproxen sodium (ANAPROX) 220 MG tablet     progesterone (PROMETRIUM) 100 MG capsule     estradiol (ESTRACE) 0.1 MG/GM vaginal cream     No current facility-administered medications for this visit.     /80   Wt 65.8 kg (145 lb)    BMI 24.13 kg/m    Constitutional: healthy, alert and no distress    (N95.1) Menopausal syndrome (hot flashes)  (primary encounter diagnosis)  Comment: Presently doing very well on a decreased dosage every other day of estrogen and progesterone with no vaginal bleeding  Plan: We will have her stop the HRT.  The patient will observe and let me know how she does over the next month.  If she continues to do well we will permanently stop HRT and have her follow back for routine health care maintenance.  I reviewed her recent lipid panel results.  If all goes well we will see her back for routine health care maintenance or as needed should concerns arise

## 2021-08-23 NOTE — NURSING NOTE
"Chief Complaint   Patient presents with     Consult     Hormone replacement questions       Initial /80   Wt 65.8 kg (145 lb)   BMI 24.13 kg/m   Estimated body mass index is 24.13 kg/m  as calculated from the following:    Height as of 21: 1.651 m (5' 5\").    Weight as of this encounter: 65.8 kg (145 lb).  BP completed using cuff size: regular    Questioned patient about current smoking habits.  Pt. has never smoked.          The following HM Due: NONE      The following patient reported/Care Every where data was sent to:  P ABSTRACT QUALITY INITIATIVES [[id number]]        Margarita Owen CMA                 "

## 2021-08-23 NOTE — PATIENT INSTRUCTIONS
You can reach your [facility] Care Team any time of the day by calling [phone]. This number will put you in touch with the 24 hour nurse line if the clinic is closed.    To contact your OB/GYN Station Coordinator/Surgery Scheduler please call [phone]. This is a direct number for your care team between 8 a.m. and 4 p.m. Monday through Friday.    [facility] is open for your convenience:  Monday through Friday 8 a.m. to 6 p.m.  Closed weekends and all major holidays.

## 2021-10-02 ENCOUNTER — HEALTH MAINTENANCE LETTER (OUTPATIENT)
Age: 76
End: 2021-10-02

## 2022-01-22 ENCOUNTER — HEALTH MAINTENANCE LETTER (OUTPATIENT)
Age: 77
End: 2022-01-22

## 2022-02-17 PROBLEM — K21.9 GASTROESOPHAGEAL REFLUX DISEASE: Status: ACTIVE | Noted: 2018-04-25

## 2022-03-18 ENCOUNTER — ANCILLARY PROCEDURE (OUTPATIENT)
Dept: MAMMOGRAPHY | Facility: CLINIC | Age: 77
End: 2022-03-18
Attending: PHYSICIAN ASSISTANT
Payer: MEDICARE

## 2022-03-18 DIAGNOSIS — Z12.31 VISIT FOR SCREENING MAMMOGRAM: ICD-10-CM

## 2022-03-18 PROCEDURE — 77063 BREAST TOMOSYNTHESIS BI: CPT | Mod: TC | Performed by: RADIOLOGY

## 2022-03-18 PROCEDURE — 77067 SCR MAMMO BI INCL CAD: CPT | Mod: TC | Performed by: RADIOLOGY

## 2022-04-29 ENCOUNTER — VIRTUAL VISIT (OUTPATIENT)
Dept: FAMILY MEDICINE | Facility: CLINIC | Age: 77
End: 2022-04-29
Payer: MEDICARE

## 2022-04-29 DIAGNOSIS — U07.1 CLINICAL DIAGNOSIS OF COVID-19: Primary | ICD-10-CM

## 2022-04-29 PROCEDURE — 99214 OFFICE O/P EST MOD 30 MIN: CPT | Mod: 95 | Performed by: NURSE PRACTITIONER

## 2022-04-29 NOTE — PROGRESS NOTES
"Alix is a 76 year old who is being evaluated via a billable video visit.      How would you like to obtain your AVS? MyChart  If the video visit is dropped, the invitation should be resent by: Text to cell phone: [phone]  Will anyone else be joining your video visit? No      Assessment & Plan     Clinical diagnosis of COVID-19  - nirmatrelvir and ritonavir (PAXLOVID) therapy pack; Take 2 tablets by mouth 2 times daily for 5 days Take 1 tablet of Nirmatelvir and 1 tablet of Ritonavir twice daily for 5 days        Reviewed chart for 5 minutes     COVID-19 positive patient.  Encounter for consideration of medication intervention. Patient does qualify for a prescription. Full discussion with patient including medication options, risks and benefits. Potential drug interactions reviewed with patient.     Treatment Planned Paxlovid at decreased dosing due to renal insufficiency, Rx sent to [place] pharmacy  [facility] [phone] [address] [facility], [address] [address]    Hours:  Mon-Fri: 8:00a - 6:00p  Sat-Sun: 8:00a - 4:00p    Drive-thru services available.  Temporary change to home medications: None   None     Estimated body mass index is 24.13 kg/m  as calculated from the following:    Height as of 5/26/21: 1.651 m (5' 5\").    Weight as of 8/23/21: 65.8 kg (145 lb).  GFR Estimate   Date Value Ref Range Status   05/26/2021 56 (L) >60 mL/min/[1.73_m2] Final     Comment:     Non  GFR Calc  Starting 12/18/2018, serum creatinine based estimated GFR (eGFR) will be   calculated using the Chronic Kidney Disease Epidemiology Collaboration   (CKD-EPI) equation.       Lab Results   Component Value Date    [id number] Not Detected 01/04/2021       No follow-ups on file.    MAMADOU Ambriz Ra, CNP  [facility]    Graham Thomson is a 76 year old who presents for the following health issues     HPI       COVID-19 Symptom Review- treatment    Took at home " test, 4 days in a row- all were positive.    was sick first and got dx with covid on Tuesday     How many days ago did these symptoms start? 4 days ago     Are any of the following symptoms significant for you?    New or worsening difficulty breathing? No    Worsening cough? Yes, it's a dry cough.     Fever or chills? No    Headache: YES- frontal     Sore throat: YES    Chest pain: no    Diarrhea: no    Body aches? no    What treatments has patient tried? Cough drops   Does patient live in a nursing home, group home, or shelter? no  Does patient have a way to get food/medications during quarantined? Yes, I have a friend or family member who can help me.    Tolerating symptoms well.  Taking in food and fluids.        Review of Systems   Constitutional, HEENT, cardiovascular, pulmonary, gi and gu systems are negative, except as otherwise noted.      Objective           Vitals:  No vitals were obtained today due to virtual visit.    Physical Exam   GENERAL: Healthy, alert and no distress  PSYCH:  No acute distress, speaking in full sentences.                Phone visit    Duration 10 minutes

## 2022-05-22 SDOH — ECONOMIC STABILITY: INCOME INSECURITY: IN THE LAST 12 MONTHS, WAS THERE A TIME WHEN YOU WERE NOT ABLE TO PAY THE MORTGAGE OR RENT ON TIME?: NO

## 2022-05-22 SDOH — HEALTH STABILITY: PHYSICAL HEALTH: ON AVERAGE, HOW MANY DAYS PER WEEK DO YOU ENGAGE IN MODERATE TO STRENUOUS EXERCISE (LIKE A BRISK WALK)?: 4 DAYS

## 2022-05-22 SDOH — ECONOMIC STABILITY: FOOD INSECURITY: WITHIN THE PAST 12 MONTHS, YOU WORRIED THAT YOUR FOOD WOULD RUN OUT BEFORE YOU GOT MONEY TO BUY MORE.: NEVER TRUE

## 2022-05-22 SDOH — ECONOMIC STABILITY: FOOD INSECURITY: WITHIN THE PAST 12 MONTHS, THE FOOD YOU BOUGHT JUST DIDN'T LAST AND YOU DIDN'T HAVE MONEY TO GET MORE.: NEVER TRUE

## 2022-05-22 SDOH — ECONOMIC STABILITY: INCOME INSECURITY: HOW HARD IS IT FOR YOU TO PAY FOR THE VERY BASICS LIKE FOOD, HOUSING, MEDICAL CARE, AND HEATING?: NOT HARD AT ALL

## 2022-05-22 SDOH — ECONOMIC STABILITY: TRANSPORTATION INSECURITY
IN THE PAST 12 MONTHS, HAS LACK OF TRANSPORTATION KEPT YOU FROM MEETINGS, WORK, OR FROM GETTING THINGS NEEDED FOR DAILY LIVING?: NO

## 2022-05-22 SDOH — ECONOMIC STABILITY: TRANSPORTATION INSECURITY
IN THE PAST 12 MONTHS, HAS THE LACK OF TRANSPORTATION KEPT YOU FROM MEDICAL APPOINTMENTS OR FROM GETTING MEDICATIONS?: NO

## 2022-05-22 SDOH — HEALTH STABILITY: PHYSICAL HEALTH: ON AVERAGE, HOW MANY MINUTES DO YOU ENGAGE IN EXERCISE AT THIS LEVEL?: 30 MIN

## 2022-05-22 ASSESSMENT — ENCOUNTER SYMPTOMS
HEARTBURN: 0
HEADACHES: 0
PALPITATIONS: 0
HEMATURIA: 0
SHORTNESS OF BREATH: 0
ABDOMINAL PAIN: 1
WEAKNESS: 0
PARESTHESIAS: 0
MYALGIAS: 0
DIZZINESS: 0
COUGH: 0
DYSURIA: 0
SORE THROAT: 0
CONSTIPATION: 0
HEMATOCHEZIA: 0
NERVOUS/ANXIOUS: 0
FREQUENCY: 0
JOINT SWELLING: 0
NAUSEA: 0
FEVER: 0
EYE PAIN: 0
BREAST MASS: 0
DIARRHEA: 0
CHILLS: 0

## 2022-05-22 ASSESSMENT — SOCIAL DETERMINANTS OF HEALTH (SDOH)
IN A TYPICAL WEEK, HOW MANY TIMES DO YOU TALK ON THE PHONE WITH FAMILY, FRIENDS, OR NEIGHBORS?: THREE TIMES A WEEK
DO YOU BELONG TO ANY CLUBS OR ORGANIZATIONS SUCH AS CHURCH GROUPS UNIONS, FRATERNAL OR ATHLETIC GROUPS, OR SCHOOL GROUPS?: YES
HOW OFTEN DO YOU GET TOGETHER WITH FRIENDS OR RELATIVES?: TWICE A WEEK
HOW OFTEN DO YOU ATTEND CHURCH OR RELIGIOUS SERVICES?: MORE THAN 4 TIMES PER YEAR

## 2022-05-22 ASSESSMENT — LIFESTYLE VARIABLES
HOW OFTEN DO YOU HAVE A DRINK CONTAINING ALCOHOL: NEVER
HOW MANY STANDARD DRINKS CONTAINING ALCOHOL DO YOU HAVE ON A TYPICAL DAY: PATIENT DOES NOT DRINK
HOW OFTEN DO YOU HAVE SIX OR MORE DRINKS ON ONE OCCASION: NEVER
AUDIT-C TOTAL SCORE: 0
SKIP TO QUESTIONS 9-10: 1

## 2022-05-22 ASSESSMENT — ACTIVITIES OF DAILY LIVING (ADL): CURRENT_FUNCTION: NO ASSISTANCE NEEDED

## 2022-05-25 ENCOUNTER — OFFICE VISIT (OUTPATIENT)
Dept: FAMILY MEDICINE | Facility: CLINIC | Age: 77
End: 2022-05-25
Payer: MEDICARE

## 2022-05-25 VITALS
OXYGEN SATURATION: 99 % | HEART RATE: 76 BPM | TEMPERATURE: 97.9 F | WEIGHT: 148.6 LBS | HEIGHT: 67 IN | BODY MASS INDEX: 23.32 KG/M2 | SYSTOLIC BLOOD PRESSURE: 132 MMHG | DIASTOLIC BLOOD PRESSURE: 78 MMHG

## 2022-05-25 DIAGNOSIS — Z11.59 NEED FOR HEPATITIS C SCREENING TEST: ICD-10-CM

## 2022-05-25 DIAGNOSIS — Z00.00 ENCOUNTER FOR MEDICARE ANNUAL WELLNESS EXAM: Primary | ICD-10-CM

## 2022-05-25 DIAGNOSIS — R79.9 ABNORMAL FINDING OF BLOOD CHEMISTRY, UNSPECIFIED: ICD-10-CM

## 2022-05-25 DIAGNOSIS — Z13.220 SCREENING FOR HYPERLIPIDEMIA: ICD-10-CM

## 2022-05-25 DIAGNOSIS — E55.9 VITAMIN D DEFICIENCY: ICD-10-CM

## 2022-05-25 DIAGNOSIS — R53.83 FATIGUE, UNSPECIFIED TYPE: ICD-10-CM

## 2022-05-25 LAB
ALBUMIN SERPL-MCNC: 4 G/DL (ref 3.4–5)
ALP SERPL-CCNC: 70 U/L (ref 40–150)
ALT SERPL W P-5'-P-CCNC: 24 U/L (ref 0–50)
ANION GAP SERPL CALCULATED.3IONS-SCNC: 6 MMOL/L (ref 3–14)
AST SERPL W P-5'-P-CCNC: 22 U/L (ref 0–45)
BILIRUB SERPL-MCNC: 0.6 MG/DL (ref 0.2–1.3)
BUN SERPL-MCNC: 19 MG/DL (ref 7–30)
CALCIUM SERPL-MCNC: 9.5 MG/DL (ref 8.5–10.1)
CHLORIDE BLD-SCNC: 107 MMOL/L (ref 94–109)
CHOLEST SERPL-MCNC: 227 MG/DL
CO2 SERPL-SCNC: 28 MMOL/L (ref 20–32)
CREAT SERPL-MCNC: 0.8 MG/DL (ref 0.52–1.04)
ERYTHROCYTE [DISTWIDTH] IN BLOOD BY AUTOMATED COUNT: 13.8 % (ref 10–15)
FASTING STATUS PATIENT QL REPORTED: NO
FERRITIN SERPL-MCNC: 40 NG/ML (ref 8–252)
GFR SERPL CREATININE-BSD FRML MDRD: 76 ML/MIN/1.73M2
GLUCOSE BLD-MCNC: 99 MG/DL (ref 70–99)
HCT VFR BLD AUTO: 39.2 % (ref 35–47)
HDLC SERPL-MCNC: 84 MG/DL
HGB BLD-MCNC: 12.9 G/DL (ref 11.7–15.7)
LDLC SERPL CALC-MCNC: 126 MG/DL
MCH RBC QN AUTO: 30 PG (ref 26.5–33)
MCHC RBC AUTO-ENTMCNC: 32.9 G/DL (ref 31.5–36.5)
MCV RBC AUTO: 91 FL (ref 78–100)
NONHDLC SERPL-MCNC: 143 MG/DL
PLATELET # BLD AUTO: 287 10E3/UL (ref 150–450)
POTASSIUM BLD-SCNC: 4.1 MMOL/L (ref 3.4–5.3)
PROT SERPL-MCNC: 7.3 G/DL (ref 6.8–8.8)
RBC # BLD AUTO: 4.3 10E6/UL (ref 3.8–5.2)
SODIUM SERPL-SCNC: 141 MMOL/L (ref 133–144)
TRIGL SERPL-MCNC: 83 MG/DL
TSH SERPL DL<=0.005 MIU/L-ACNC: 2.66 MU/L (ref 0.4–4)
VIT B12 SERPL-MCNC: 693 PG/ML (ref 193–986)
WBC # BLD AUTO: 5.8 10E3/UL (ref 4–11)

## 2022-05-25 PROCEDURE — 91305 COVID-19,PF,PFIZER (12+ YRS): CPT | Performed by: PHYSICIAN ASSISTANT

## 2022-05-25 PROCEDURE — 0054A COVID-19,PF,PFIZER (12+ YRS): CPT | Performed by: PHYSICIAN ASSISTANT

## 2022-05-25 PROCEDURE — 85027 COMPLETE CBC AUTOMATED: CPT | Performed by: PHYSICIAN ASSISTANT

## 2022-05-25 PROCEDURE — 82306 VITAMIN D 25 HYDROXY: CPT | Performed by: PHYSICIAN ASSISTANT

## 2022-05-25 PROCEDURE — 80061 LIPID PANEL: CPT | Performed by: PHYSICIAN ASSISTANT

## 2022-05-25 PROCEDURE — 80053 COMPREHEN METABOLIC PANEL: CPT | Performed by: PHYSICIAN ASSISTANT

## 2022-05-25 PROCEDURE — G0439 PPPS, SUBSEQ VISIT: HCPCS | Performed by: PHYSICIAN ASSISTANT

## 2022-05-25 PROCEDURE — 84443 ASSAY THYROID STIM HORMONE: CPT | Performed by: PHYSICIAN ASSISTANT

## 2022-05-25 PROCEDURE — 82728 ASSAY OF FERRITIN: CPT | Performed by: PHYSICIAN ASSISTANT

## 2022-05-25 PROCEDURE — 86803 HEPATITIS C AB TEST: CPT | Performed by: PHYSICIAN ASSISTANT

## 2022-05-25 PROCEDURE — 36415 COLL VENOUS BLD VENIPUNCTURE: CPT | Performed by: PHYSICIAN ASSISTANT

## 2022-05-25 PROCEDURE — 82607 VITAMIN B-12: CPT | Performed by: PHYSICIAN ASSISTANT

## 2022-05-25 ASSESSMENT — ENCOUNTER SYMPTOMS
DIZZINESS: 0
CHILLS: 0
ABDOMINAL PAIN: 1
BREAST MASS: 0
NAUSEA: 0
HEARTBURN: 0
NERVOUS/ANXIOUS: 0
JOINT SWELLING: 0
FEVER: 0
SHORTNESS OF BREATH: 0
PARESTHESIAS: 0
CONSTIPATION: 0
EYE PAIN: 0
HEMATURIA: 0
HEADACHES: 0
DYSURIA: 0
FREQUENCY: 0
HEMATOCHEZIA: 0
PALPITATIONS: 0
WEAKNESS: 0
SORE THROAT: 0
DIARRHEA: 0
MYALGIAS: 0
COUGH: 0

## 2022-05-25 ASSESSMENT — PAIN SCALES - GENERAL: PAINLEVEL: NO PAIN (0)

## 2022-05-25 ASSESSMENT — ACTIVITIES OF DAILY LIVING (ADL): CURRENT_FUNCTION: NO ASSISTANCE NEEDED

## 2022-05-25 NOTE — PROGRESS NOTES
"SUBJECTIVE:   Alix Escobar is a 76 year old female who presents for Preventive Visit.    Concerns  1. Tiredness -   2. COVID Booster - Pfizer    Patient has been advised of split billing requirements and indicates understanding: Yes  Are you in the first 12 months of your Medicare coverage?  No    Healthy Habits:     In general, how would you rate your overall health?  Good    Frequency of exercise:  4-5 days/week    Duration of exercise:  30-45 minutes    Do you usually eat at least 4 servings of fruit and vegetables a day, include whole grains    & fiber and avoid regularly eating high fat or \"junk\" foods?  No    Taking medications regularly:  Yes    Medication side effects:  Not applicable    Ability to successfully perform activities of daily living:  No assistance needed    Home Safety:  No safety concerns identified    Hearing Impairment:  No hearing concerns    In the past 6 months, have you been bothered by leaking of urine? Yes    In general, how would you rate your overall mental or emotional health?  Good      PHQ-2 Total Score: 0    Do you feel safe in your environment? Yes    Have you ever done Advance Care Planning? (For example, a Health Directive, POLST, or a discussion with a medical provider or your loved ones about your wishes): Yes, advance care planning is on file.    Fall risk  Fallen 2 or more times in the past year?: No  Any fall with injury in the past year?: No    Cognitive Screening   1) Repeat 3 items (Leader, Season, Table)    2) Clock draw: NORMAL  3) 3 item recall: Recalls 3 objects  Results: 3 items recalled: COGNITIVE IMPAIRMENT LESS LIKELY    Mini-CogTM Copyright ANNA Simmons. Licensed by the author for use in [facility]; reprinted with permission (arik@.[facility]). All rights reserved.      Do you have sleep apnea, excessive snoring or daytime drowsiness?: yes, has been tested and was negative.    Reviewed and updated as needed this visit by clinical staff                "   Reviewed and updated as needed this visit by Provider                   Social History     Tobacco Use     Smoking status: Never Smoker     Smokeless tobacco: Never Used   Substance Use Topics     Alcohol use: No     If you drink alcohol do you typically have >3 drinks per day or >7 drinks per week? No    Alcohol Use 5/22/2022   Prescreen: >3 drinks/day or >7 drinks/week? Not Applicable   Prescreen: >3 drinks/day or >7 drinks/week? -   No flowsheet data found.      Hyperlipidemia Follow-Up      Are you regularly taking any medication or supplement to lower your cholesterol?   No    Are you having muscle aches or other side effects that you think could be caused by your cholesterol lowering medication?  No    Current providers sharing in care for this patient include:   Patient Care Team:  Danielle Borrero PA-C as PCP - General (Physician Assistant)  Brent Casas MD as Family Medicine Physician (Family Practice)  Juan Jose Levy MD as Assigned OBGYN Provider  Jam Wright MD as Assigned Surgical Provider  Danielle Borrero PA-C as Assigned PCP    The following health maintenance items are reviewed in Epic and correct as of today:  Health Maintenance Due   Topic Date Due     HEPATITIS C SCREENING  Never done     ADVANCE CARE PLANNING  02/03/2020     MEDICARE ANNUAL WELLNESS VISIT  07/21/2021     COVID-19 Vaccine (4 - Booster for Pfizer series) 02/12/2022     LIPID  05/26/2022     ANNUAL REVIEW OF HM ORDERS  06/25/2022     Lab work is in process  Pneumonia Vaccine:For adults 65 years or older who do not have an immunocompromising condition, cerebrospinal fluid leak, or cochlear implant and want to receive PPSV23 ONLY: Administer 1 dose of PPSV23. Anyone who received any doses of PPSV23 before age 65 should receive 1 final dose of the vaccine at age 65 or older. Administer this last dose at least 5 years after the prior PPSV23 dose.    Mammogram Screening - Patient over age 75, has elected to continue with  "screening.  Pertinent mammograms are reviewed under the imaging tab.    Review of Systems   Constitutional: Negative for chills and fever.   HENT: Negative for congestion, ear pain, hearing loss and sore throat.    Eyes: Positive for visual disturbance. Negative for pain.   Respiratory: Negative for cough and shortness of breath.    Cardiovascular: Negative for chest pain, palpitations and peripheral edema.   Gastrointestinal: Positive for abdominal pain. Negative for constipation, diarrhea, heartburn, hematochezia and nausea.   Breasts:  Negative for tenderness, breast mass and discharge.   Genitourinary: Negative for dysuria, frequency, genital sores, hematuria, pelvic pain, urgency, vaginal bleeding and vaginal discharge.   Musculoskeletal: Negative for joint swelling and myalgias.   Skin: Negative for rash.   Neurological: Negative for dizziness, weakness, headaches and paresthesias.   Psychiatric/Behavioral: Negative for mood changes. The patient is not nervous/anxious.          OBJECTIVE:   /78 (BP Location: Right arm, Patient Position: Sitting)   Pulse 76   Temp 97.9  F (36.6  C) (Temporal)   Ht 1.689 m (5' 6.5\")   Wt 67.4 kg (148 lb 9.6 oz)   SpO2 99%   BMI 23.63 kg/m   Estimated body mass index is 24.13 kg/m  as calculated from the following:    Height as of 5/26/21: 1.651 m (5' 5\").    Weight as of 8/23/21: 65.8 kg (145 lb).  Physical Exam  GENERAL APPEARANCE: healthy, alert and no distress  RESP: lungs clear to auscultation - no rales, rhonchi or wheezes  CV: regular rates and rhythm, normal S1 S2, no S3 or S4 and no murmur, click or rub  ABDOMEN: soft, nontender, without hepatosplenomegaly or masses and bowel sounds normal  NEURO: Normal strength and tone, mentation intact and speech normal  PSYCH: mentation appears normal and affect normal/bright        ASSESSMENT / PLAN:   (Z00.00) Encounter for Medicare annual wellness exam  (primary encounter diagnosis)  Comment:   Plan: DC ANNUAL " "WELLNESS VISIT, PPS, SUBSEQUENT            (Z11.59) Need for hepatitis C screening test  Comment: agreeable.   Plan: Hepatitis C Screen Reflex to HCV RNA Quant and         Genotype            (Z13.220) Screening for hyperlipidemia  Comment: doesn't want statin  Plan: Lipid panel reflex to direct LDL Non-fasting            (R53.83) Fatigue, unspecified type  Comment: await labs. Feels well otherwise.   Plan: CBC with platelets, TSH with free T4 reflex,         Comprehensive metabolic panel (BMP + Alb, Alk         Phos, ALT, AST, Total. Bili, TP), Ferritin,         Vitamin B12            (E55.9) Vitamin D deficiency  Comment:   Plan: Vitamin D Deficiency            (R79.9) Abnormal finding of blood chemistry, unspecified   Comment:   Plan: Ferritin              Patient has been advised of split billing requirements and indicates understanding: Yes    COUNSELING:  Reviewed preventive health counseling, as reflected in patient instructions       Regular exercise       Healthy diet/nutrition       Vision screening    Estimated body mass index is 24.13 kg/m  as calculated from the following:    Height as of 5/26/21: 1.651 m (5' 5\").    Weight as of 8/23/21: 65.8 kg (145 lb).        She reports that she has never smoked. She has never used smokeless tobacco.      Appropriate preventive services were discussed with this patient, including applicable screening as appropriate for cardiovascular disease, diabetes, osteopenia/osteoporosis, and glaucoma.  As appropriate for age/gender, discussed screening for colorectal cancer, prostate cancer, breast cancer, and cervical cancer. Checklist reviewing preventive services available has been given to the patient.    Reviewed patients plan of care and provided an AVS. The Basic Care Plan (routine screening as documented in Health Maintenance) for Alix meets the Care Plan requirement. This Care Plan has been established and reviewed with the Patient.    Counseling Resources:  ATP IV " Guidelines  Pooled Cohorts Equation Calculator  Breast Cancer Risk Calculator  Breast Cancer: Medication to Reduce Risk  FRAX Risk Assessment  ICSI Preventive Guidelines  Dietary Guidelines for Americans, 2010  USDA's MyPlate  ASA Prophylaxis  Lung CA Screening    ALFRED Marcos [facility]    Identified Health Risks:

## 2022-05-25 NOTE — PATIENT INSTRUCTIONS
Patient Education   Personalized Prevention Plan  You are due for the preventive services outlined below.  Your care team is available to assist you in scheduling these services.  If you have already completed any of these items, please share that information with your care team to update in your medical record.  Health Maintenance Due   Topic Date Due     Hepatitis C Screening  Never done     Discuss Advance Care Planning  02/03/2020     Annual Wellness Visit  07/21/2021     COVID-19 Vaccine (4 - Booster for Pfizer series) 02/12/2022     Cholesterol Lab  05/26/2022     ANNUAL REVIEW OF HM ORDERS  06/25/2022

## 2022-05-26 LAB
DEPRECATED CALCIDIOL+CALCIFEROL SERPL-MC: 43 UG/L (ref 20–75)
HCV AB SERPL QL IA: NONREACTIVE

## 2022-06-03 ENCOUNTER — E-VISIT (OUTPATIENT)
Dept: FAMILY MEDICINE | Facility: CLINIC | Age: 77
End: 2022-06-03
Payer: COMMERCIAL

## 2022-06-03 DIAGNOSIS — R68.89 FEELING UNWELL: Primary | ICD-10-CM

## 2022-06-03 PROCEDURE — 99207 PR NON-BILLABLE SERV PER CHARTING: CPT | Performed by: PHYSICIAN ASSISTANT

## 2022-09-03 ENCOUNTER — HEALTH MAINTENANCE LETTER (OUTPATIENT)
Age: 77
End: 2022-09-03

## 2022-09-22 ENCOUNTER — OFFICE VISIT (OUTPATIENT)
Dept: INTERNAL MEDICINE | Facility: CLINIC | Age: 77
End: 2022-09-22
Payer: MEDICARE

## 2022-09-22 ENCOUNTER — ANCILLARY PROCEDURE (OUTPATIENT)
Dept: GENERAL RADIOLOGY | Facility: CLINIC | Age: 77
End: 2022-09-22
Attending: INTERNAL MEDICINE
Payer: MEDICARE

## 2022-09-22 VITALS
RESPIRATION RATE: 18 BRPM | TEMPERATURE: 97.6 F | DIASTOLIC BLOOD PRESSURE: 90 MMHG | BODY MASS INDEX: 25.27 KG/M2 | WEIGHT: 161 LBS | HEART RATE: 81 BPM | SYSTOLIC BLOOD PRESSURE: 160 MMHG | OXYGEN SATURATION: 98 % | HEIGHT: 67 IN

## 2022-09-22 DIAGNOSIS — M79.644 PAIN OF RIGHT THUMB: Primary | ICD-10-CM

## 2022-09-22 DIAGNOSIS — R03.0 ELEVATED BLOOD PRESSURE READING WITHOUT DIAGNOSIS OF HYPERTENSION: ICD-10-CM

## 2022-09-22 DIAGNOSIS — M79.644 PAIN OF RIGHT THUMB: ICD-10-CM

## 2022-09-22 DIAGNOSIS — R53.83 FATIGUE, UNSPECIFIED TYPE: ICD-10-CM

## 2022-09-22 DIAGNOSIS — L21.9 SEBORRHEIC DERMATITIS: ICD-10-CM

## 2022-09-22 DIAGNOSIS — B35.3 TINEA PEDIS OF BOTH FEET: ICD-10-CM

## 2022-09-22 LAB
BASOPHILS # BLD AUTO: 0 10E3/UL (ref 0–0.2)
BASOPHILS NFR BLD AUTO: 0 %
EOSINOPHIL # BLD AUTO: 0.1 10E3/UL (ref 0–0.7)
EOSINOPHIL NFR BLD AUTO: 1 %
ERYTHROCYTE [DISTWIDTH] IN BLOOD BY AUTOMATED COUNT: 13.1 % (ref 10–15)
HCT VFR BLD AUTO: 41.7 % (ref 35–47)
HGB BLD-MCNC: 13.7 G/DL (ref 11.7–15.7)
IMM GRANULOCYTES # BLD: 0 10E3/UL
IMM GRANULOCYTES NFR BLD: 0 %
LYMPHOCYTES # BLD AUTO: 1.8 10E3/UL (ref 0.8–5.3)
LYMPHOCYTES NFR BLD AUTO: 21 %
MCH RBC QN AUTO: 28.9 PG (ref 26.5–33)
MCHC RBC AUTO-ENTMCNC: 32.9 G/DL (ref 31.5–36.5)
MCV RBC AUTO: 88 FL (ref 78–100)
MONOCYTES # BLD AUTO: 0.6 10E3/UL (ref 0–1.3)
MONOCYTES NFR BLD AUTO: 7 %
NEUTROPHILS # BLD AUTO: 5.8 10E3/UL (ref 1.6–8.3)
NEUTROPHILS NFR BLD AUTO: 70 %
PLATELET # BLD AUTO: 285 10E3/UL (ref 150–450)
RBC # BLD AUTO: 4.74 10E6/UL (ref 3.8–5.2)
WBC # BLD AUTO: 8.3 10E3/UL (ref 4–11)

## 2022-09-22 PROCEDURE — 36415 COLL VENOUS BLD VENIPUNCTURE: CPT | Performed by: INTERNAL MEDICINE

## 2022-09-22 PROCEDURE — 73140 X-RAY EXAM OF FINGER(S): CPT | Mod: TC | Performed by: RADIOLOGY

## 2022-09-22 PROCEDURE — 80050 GENERAL HEALTH PANEL: CPT | Performed by: INTERNAL MEDICINE

## 2022-09-22 PROCEDURE — 99214 OFFICE O/P EST MOD 30 MIN: CPT | Performed by: INTERNAL MEDICINE

## 2022-09-22 RX ORDER — CLOTRIMAZOLE 1 %
CREAM (GRAM) TOPICAL 2 TIMES DAILY
Qty: 30 G | Refills: 1 | Status: SHIPPED | OUTPATIENT
Start: 2022-09-22 | End: 2023-05-30

## 2022-09-22 ASSESSMENT — ENCOUNTER SYMPTOMS
GASTROINTESTINAL NEGATIVE: 1
NEUROLOGICAL NEGATIVE: 1
RESPIRATORY NEGATIVE: 1
FATIGUE: 1
UNEXPECTED WEIGHT CHANGE: 1
JOINT SWELLING: 1
ARTHRALGIAS: 1
CARDIOVASCULAR NEGATIVE: 1

## 2022-09-22 NOTE — PATIENT INSTRUCTIONS
Monitoring BP.    X rays for thumb pain are pending.    Clotrimazole for athletes foot.    Labs for evaluation of fatigue are pending.

## 2022-09-22 NOTE — PROGRESS NOTES
Assessment & Plan     Pain of right thumb  At this time, patient is noted to have prominent edema and pain on palpation of her PIP joint involving her right thumb.  She did agree to proceed with x-ray images of her thumb to assess the bones in her thumb as well as the affected joint.  X-ray images are currently pending.  While we are awaiting the imaging results, patient will continue her use of naproxen on an as-needed basis for management of her discomfort.  We also discussed icing of the affected extremity.  - XR Finger Right G/E 2 Views; Future    Tinea pedis of both feet  Patient does appear to have a case of tinea pedis involving both feet.  We will proceed with an alternative antifungal cream to see if we can help resolve her issues.  A prescription for clotrimazole 1% external cream with instruction to apply to the affected area topically twice per day was submitted to her pharmacy.  Patient was also encouraged to try to keep the area as clean and dry as possible.  We will monitor response to treatment.  - clotrimazole (LOTRIMIN) 1 % external cream; Apply topically 2 times daily    Seborrheic dermatitis  Patient does have a rash that is consistent with seborrheic dermatitis located behind both of her ears.  We did elect to see how she would respond to the application of the clotrimazole cream behind her ears twice per day as well.  Patient was also encouraged to try washing the area with selenium containing shampoo to see if this does help resolve the issue.  I did encourage patient to try to avoid manipulating the affected areas.    Fatigue, unspecified type  Patient is reporting some issues with persistent fatigue over the past few months.  She is also noted to have an approximate 13 to 14 pound weight gain over the past few months as well.  Patient was agreeable to proceed with lab testing to evaluate for any potential causes of her fatigue.  We did discuss etiologies that could cause her fatigue  including anemia, glucose abnormalities, thyroid disease, and electrolyte abnormalities.  She did submit a blood sample for a CMP, CBC, and TSH with reflex free T4.  Results are currently pending.  She did elect to wait the results of her labs before proceeding with any further recommendations.  - Comprehensive metabolic panel (BMP + Alb, Alk Phos, ALT, AST, Total. Bili, TP); Future  - CBC with platelets and differential; Future  - TSH with free T4 reflex; Future    Elevated blood pressure reading without diagnosis of hypertension  Patient's initial blood pressure was quite elevated, but her repeat blood pressure had improved significantly down to 160/90.  Given that she is dealing with several stressors including thumb pain and a pruritic rash behind her ears and feet, we did elect to proceed with monitoring her blood pressure levels outside clinic setting to see if they are more normalized when she does not have so many stressors that could be contributing to her increased blood pressure.  I did request that the patient check her blood pressure a few times a week, and contact the clinic in approximately 2 to 3 weeks with an average reading of her blood pressure checks.  Further recommendations were made at that time.      Ordering of each unique test  Prescription drug management  30 minutes spent on the date of the encounter doing chart review, history and exam, documentation and further activities per the note       See Patient Instructions    Return in about 3 weeks (around 10/13/2022) for Follow up BP.    Devon Umana MD  [facility]    Graham Thomson is a 77 year old, presenting for the following health issues:  Right thumb edema for about 10 days. No known injury.    Patient is a 77-year-old  female who presents to the clinic with multiple concerns.  Her first concern is in regards to athlete's foot.  Patient states that for the last several months she has had  "intermittent episodes of athletes feet involving both feet.  She does report a red, wet appearing rash that is quite pruritic located between her toes.  She has been applying Tinactin cream, but it no longer seems to be providing her with any substantial relief.  She also reports an area of redness and itchiness located behind her ears bilaterally.  This has been present for the past few months as well.  He has not tried any topical creams in this area.  Patient does report issues with thumb pain on her right hand.  She states that for the past 1 to 2 weeks she has had pain and swelling involving the PIP joint of the right thumb.  She does not recall any traumatic event or injury that led to the development of this swelling.  She has been taking naproxen, but it only provides her with minimal relief.  She is not reporting any other areas of joint pain or swelling at this time.  Patient is noted to be left-hand-dominant.  She also reports some issues with fatigue for the past several months.  Review of her chart does also show that she has gained approximately 13 to 14 pounds over the last 4 months.  She does state that she just \"has no energy.\"  She denies any change in appetite.  Patient has had no change in her bowel or bladder habits.  She has had no changes to her medication regimen.  She would like lab work at this time to evaluate her issues with fatigue.  Patient is noted to have initial blood pressure of 190/105 upon arrival to the clinic.  A repeat blood pressure was noted to be improved at 160/90 at the end of her visit.  Patient is in some noticeable discomfort and itching of her feet and the pain from her thumb.  She states that she has never had issues with her blood pressure other than just prior to her retiring several years ago.  She cannot recall ever taking blood pressure medication.    History of Present Illness       Reason for visit:  Thumb joint pain.  Symptom onset:  1-2 weeks ago  Symptom " "intensity:  Moderate  Symptom progression:  Worsening  Had these symptoms before:  No  What makes it worse:  Bending or pressing  What makes it better:  Resting it    She eats 4 or more servings of fruits and vegetables daily.She consumes 0 sweetened beverage(s) daily.She exercises with enough effort to increase her heart rate 10 to 19 minutes per day.  She exercises with enough effort to increase her heart rate 4 days per week.   She is taking medications regularly.       Review of Systems   Constitutional: Positive for fatigue and unexpected weight change.   HENT: Negative.    Respiratory: Negative.    Cardiovascular: Negative.    Gastrointestinal: Negative.    Genitourinary: Negative.    Musculoskeletal: Positive for arthralgias and joint swelling.   Skin: Positive for rash.   Neurological: Negative.           Objective    Blood pressure (!) 160/90, pulse 81, temperature 97.6  F (36.4  C), resp. rate 18, height 1.689 m (5' 6.5\"), weight 73 kg (161 lb), SpO2 98 %, not currently breastfeeding.      Physical Exam  Vitals reviewed.   Constitutional:       General: She is not in acute distress.     Appearance: She is well-developed.   HENT:      Right Ear: Tympanic membrane and external ear normal.      Left Ear: Tympanic membrane and external ear normal.      Nose: Nose normal.      Mouth/Throat:      Pharynx: No oropharyngeal exudate.   Eyes:      General:         Right eye: No discharge.         Left eye: No discharge.      Conjunctiva/sclera: Conjunctivae normal.      Pupils: Pupils are equal, round, and reactive to light.   Neck:      Thyroid: No thyromegaly.      Trachea: No tracheal deviation.   Cardiovascular:      Rate and Rhythm: Normal rate and regular rhythm.      Pulses: Normal pulses.      Heart sounds: Normal heart sounds, S1 normal and S2 normal. No murmur heard.    No friction rub. No S3 or S4 sounds.   Pulmonary:      Effort: Pulmonary effort is normal. No respiratory distress.      Breath sounds: " Normal breath sounds. No wheezing or rales.   Abdominal:      General: Bowel sounds are normal.      Palpations: Abdomen is soft. There is no mass.      Tenderness: There is no abdominal tenderness.   Musculoskeletal:      Right hand: Swelling (Swelling of PIP joint of right thumb.) and tenderness (PIP joint of right thumb) present. Decreased range of motion.      Left hand: Normal.      Cervical back: Neck supple.   Lymphadenopathy:      Cervical: No cervical adenopathy.   Skin:     Findings: Rash (Erythematous, wet appearing rash noted between the toes of her feet bilaterally.  She has a similar appearing rash located behind her ears bilaterally as well.  No pustules or vesicles noted.) present.   Neurological:      Mental Status: She is alert and oriented to person, place, and time.      Motor: No abnormal muscle tone.      Deep Tendon Reflexes: Reflexes are normal and symmetric.        Diagnostic Test Results: CMP, CBC, and TSH with reflex free T4 are pending.  X-ray of right thumb, 2 views, is also pending.

## 2022-09-23 LAB
ALBUMIN SERPL-MCNC: 4 G/DL (ref 3.4–5)
ALP SERPL-CCNC: 87 U/L (ref 40–150)
ALT SERPL W P-5'-P-CCNC: 22 U/L (ref 0–50)
ANION GAP SERPL CALCULATED.3IONS-SCNC: 7 MMOL/L (ref 3–14)
AST SERPL W P-5'-P-CCNC: 20 U/L (ref 0–45)
BILIRUB SERPL-MCNC: 0.4 MG/DL (ref 0.2–1.3)
BUN SERPL-MCNC: 23 MG/DL (ref 7–30)
CALCIUM SERPL-MCNC: 9.9 MG/DL (ref 8.5–10.1)
CHLORIDE BLD-SCNC: 107 MMOL/L (ref 94–109)
CO2 SERPL-SCNC: 26 MMOL/L (ref 20–32)
CREAT SERPL-MCNC: 0.83 MG/DL (ref 0.52–1.04)
GFR SERPL CREATININE-BSD FRML MDRD: 72 ML/MIN/1.73M2
GLUCOSE BLD-MCNC: 97 MG/DL (ref 70–99)
POTASSIUM BLD-SCNC: 4.4 MMOL/L (ref 3.4–5.3)
PROT SERPL-MCNC: 7.3 G/DL (ref 6.8–8.8)
SODIUM SERPL-SCNC: 140 MMOL/L (ref 133–144)
TSH SERPL DL<=0.005 MIU/L-ACNC: 1.54 MU/L (ref 0.4–4)

## 2022-11-28 ENCOUNTER — OFFICE VISIT (OUTPATIENT)
Dept: FAMILY MEDICINE | Facility: CLINIC | Age: 77
End: 2022-11-28
Payer: MEDICARE

## 2022-11-28 VITALS
WEIGHT: 148.4 LBS | HEIGHT: 66 IN | TEMPERATURE: 97.7 F | RESPIRATION RATE: 14 BRPM | BODY MASS INDEX: 23.85 KG/M2 | DIASTOLIC BLOOD PRESSURE: 82 MMHG | SYSTOLIC BLOOD PRESSURE: 130 MMHG | OXYGEN SATURATION: 96 % | HEART RATE: 71 BPM

## 2022-11-28 DIAGNOSIS — R61 DIAPHORESIS: ICD-10-CM

## 2022-11-28 DIAGNOSIS — B35.3 TINEA PEDIS OF BOTH FEET: Primary | ICD-10-CM

## 2022-11-28 DIAGNOSIS — R03.0 ELEVATED BLOOD PRESSURE READING WITHOUT DIAGNOSIS OF HYPERTENSION: ICD-10-CM

## 2022-11-28 PROCEDURE — 99214 OFFICE O/P EST MOD 30 MIN: CPT | Performed by: PHYSICIAN ASSISTANT

## 2022-11-28 NOTE — PROGRESS NOTES
Assessment & Plan     Tinea pedis of both feet  Patient had improvement with the clotrimazole but notes she had burning with use. Symptoms cam back after being off the medication.  We will try a topical powder instead.   - miconazole (MICATIN) 2 % external powder; Apply topically 2 times daily    Diaphoresis  She notes that her feet and hands can often be sweaty. This could be contributing to her tinea pedis. Once symptoms have improved she can try the Drysol if she would like to help with sweating.  - aluminum chloride (DRYSOL) 20 % external solution; Apply topically At Bedtime    Elevated blood pressure reading without diagnosis of hypertension  Blood pressure elevated but not enough to start medications. She is working on diet changes. DASH diet briefly reviewed and hand out given.    Note from Dr. Umana reviewed.  Review of external notes as documented elsewhere in note  Prescription drug management  31 minutes spent on the date of the encounter doing chart review, history and exam, documentation and further activities per the note       See Patient Instructions    No follow-ups on file.   Follow-up Visit   Expected date:  May 28, 2023 (Approximate)      Follow Up Appointment Details:     Follow-up with whom?: PCP    Follow-Up for what?: Medicare Wellness    Welcome or Annual?: Annual Wellness    How?: In Person                    Carol Yang PA-C  [facility]    Graham Thomson is a 77 year old presenting for the following health issues:  Hypertension and Athletes foot    Checking blood pressure at home:  120-140's/80's    History of Present Illness       Hypertension: She presents for follow up of hypertension.  She does check blood pressure  regularly outside of the clinic. Outside blood pressures have been over 140/90. She follows a low salt diet.         Concern -   Onset: one year ago  Description: patient states her feet get hot and swollen at night. She believes  "its athletes foot  Intensity: moderate  Progression of Symptoms:  intermittent  Accompanying Signs & Symptoms: hot and swollen.  Previous history of similar problem: occasionally in the summer when its hot out  Precipitating factors:        Worsened by: when its hot outside. Even wearing shoes.  Alleviating factors:        Improved by: the medicine does work but it comes back  Therapies tried and outcome: over the counter Lotrimin was prescribed last time and it burned but did help however it came back 2 weeks later. She also applied some this morning and it burned.      Review of Systems   GENERAL:  No fevers        Objective    /82 (BP Location: Right arm, Patient Position: Sitting, Cuff Size: Adult Regular)   Pulse 71   Temp 97.7  F (36.5  C) (Oral)   Resp 14   Ht 1.676 m (5' 6\")   Wt 67.3 kg (148 lb 6.4 oz)   SpO2 96%   BMI 23.95 kg/m    Body mass index is 23.95 kg/m .  Physical Exam   GENERAL: No acute distress  HEENT: Normocephalic  VASCULAR: 1-2+ DP and PT pulses bilaterally  EXTREMITIES:   Bilateral feet with slightly erythema and scale on the bottoms of the feet. Some in between the toes as well.  NEURO: Alert, non-focal                "

## 2022-12-24 DIAGNOSIS — B35.3 TINEA PEDIS OF BOTH FEET: ICD-10-CM

## 2022-12-26 RX ORDER — MICONAZOLE NITRATE 20 MG/G
POWDER TOPICAL
Qty: 71 G | Refills: 3 | Status: SHIPPED | OUTPATIENT
Start: 2022-12-26 | End: 2023-04-18

## 2022-12-26 NOTE — TELEPHONE ENCOUNTER
Routing refill request to provider for review/approval because:  Drug not on the [facility] refill protocol     Dalia Story RN on 12/26/2022 at 9:42 AM

## 2023-01-13 ENCOUNTER — TELEPHONE (OUTPATIENT)
Dept: FAMILY MEDICINE | Facility: CLINIC | Age: 78
End: 2023-01-13

## 2023-01-13 ENCOUNTER — OFFICE VISIT (OUTPATIENT)
Dept: FAMILY MEDICINE | Facility: CLINIC | Age: 78
End: 2023-01-13
Payer: MEDICARE

## 2023-01-13 VITALS
BODY MASS INDEX: 24.49 KG/M2 | HEART RATE: 91 BPM | TEMPERATURE: 97.4 F | RESPIRATION RATE: 20 BRPM | WEIGHT: 147 LBS | SYSTOLIC BLOOD PRESSURE: 158 MMHG | OXYGEN SATURATION: 100 % | HEIGHT: 65 IN | DIASTOLIC BLOOD PRESSURE: 94 MMHG

## 2023-01-13 DIAGNOSIS — K21.9 GASTROESOPHAGEAL REFLUX DISEASE WITHOUT ESOPHAGITIS: ICD-10-CM

## 2023-01-13 DIAGNOSIS — I10 BENIGN ESSENTIAL HYPERTENSION: Primary | ICD-10-CM

## 2023-01-13 PROCEDURE — 99213 OFFICE O/P EST LOW 20 MIN: CPT

## 2023-01-13 RX ORDER — LOSARTAN POTASSIUM 25 MG/1
25 TABLET ORAL DAILY
Qty: 40 TABLET | Refills: 0 | Status: SHIPPED | OUTPATIENT
Start: 2023-01-13 | End: 2023-02-20

## 2023-01-13 NOTE — TELEPHONE ENCOUNTER
Reason for Call:  Appointment Request    Patient requesting this type of appt:  Appt ASAP with PCP or other provider    Requested provider: PCP / open to other providers    Reason patient unable to be scheduled:BP concerns 153/100    When does patient want to be seen/preferred time: ASAP    Comments: none    Could we send this information to you in [email or web address] or would you prefer to receive a phone call?:  Call    Call taken on 1/13/2023 at 9:52 AM by Adelina St

## 2023-01-13 NOTE — PROGRESS NOTES
Assessment & Plan     (I10) Benign essential hypertension  (primary encounter diagnosis)  Comment: patient hypertensive today. Given patient recorded history reasonable to trial losartan. Will have patient follow up in one month for BP and med check. Will have BMP completed at that time as well. Risks, benefits, side effects and intended purposes of Losartan thoroughly discussed.  Patient agreeable with plan of care and at this point patient will follow up as needed unless acute concerns arise in the meantime.  Plan: losartan (COZAAR) 25 MG tablet, PRIMARY CARE         FOLLOW-UP SCHEDULING, Basic metabolic panel          (Ca, Cl, CO2, Creat, Gluc, K, Na, BUN)    (K21.9) Gastroesophageal reflux disease without esophagitis  Comment: stable, not taking anything for management. Monitor as needed at this time.   Plan: see above.          1 month follow up.    No follow-ups on file.   Follow-up Visit   Expected date:  Feb 13, 2023 (Approximate)      Follow Up Appointment Details:     Follow-up with whom?: Me    Follow-Up for what?: Acute Issue Recheck    Additional Details: bp check and labs    How?: In Person or Virtual                    MAMADOU Rossi [facility]    Graham Thomson is a 77 year old, presenting for the following health issues:  Hypertension       History of Present Illness       Hypertension: She presents for follow up of hypertension.  She does check blood pressure  regularly outside of the clinic. Outside blood pressures have been over 140/90. She follows a low salt diet.       -Average BP at home has been 120s-150s/ 90s-100s  -Follows low salt diet  -Cooks for herself  -Exercises regularly goes to [employer] 3x/week  -Worried about recent BP's, would like to start something to help manage given still elevated regardless of lifestyle modifications.   -denies chest pain, palpitations, SOB, fatigue, abdominal pain, n/v, headaches, visual disturbances, lightheadedness,  "dizziness, syncope.    Review of Systems   Constitutional, HEENT, cardiovascular, pulmonary, gi and gu systems are negative, except as otherwise noted.      Objective    BP (!) 168/96 (BP Location: Right arm, Patient Position: Sitting, Cuff Size: Adult Regular)   Pulse 91   Temp 97.4  F (36.3  C) (Oral)   Resp 20   Ht 1.651 m (5' 5\")   Wt 66.7 kg (147 lb)   SpO2 100%   BMI 24.46 kg/m    Body mass index is 24.46 kg/m .  Physical Exam  Vitals and nursing note reviewed.   Constitutional:       General: She is not in acute distress.     Appearance: Normal appearance.   Cardiovascular:      Rate and Rhythm: Normal rate and regular rhythm.      Heart sounds: No murmur heard.    No friction rub. No gallop.      Comments: Slight ankle edema noted bilaterally, nonpitting.   Pulmonary:      Effort: Pulmonary effort is normal. No respiratory distress.      Breath sounds: No wheezing, rhonchi or rales.   Musculoskeletal:      Right lower leg: Edema present.      Left lower leg: Edema present.   Skin:     General: Skin is warm and dry.   Neurological:      Mental Status: She is alert.   Psychiatric:         Mood and Affect: Mood normal.         Behavior: Behavior normal. Behavior is cooperative.         Thought Content: Thought content normal.         Judgment: Judgment normal.              "

## 2023-02-17 ENCOUNTER — VIRTUAL VISIT (OUTPATIENT)
Dept: FAMILY MEDICINE | Facility: CLINIC | Age: 78
End: 2023-02-17
Payer: MEDICARE

## 2023-02-17 DIAGNOSIS — I10 BENIGN ESSENTIAL HYPERTENSION: ICD-10-CM

## 2023-02-17 DIAGNOSIS — I10 BENIGN ESSENTIAL HYPERTENSION: Primary | ICD-10-CM

## 2023-02-17 DIAGNOSIS — B35.3 TINEA PEDIS OF BOTH FEET: ICD-10-CM

## 2023-02-17 PROCEDURE — 99213 OFFICE O/P EST LOW 20 MIN: CPT | Mod: 95

## 2023-02-17 NOTE — PROGRESS NOTES
Alix is a 77 year old who is being evaluated via a billable telephone visit.      What phone number would you like to be contacted at? [phone]  How would you like to obtain your AVS? Karmen    Distant Location (provider location):  On-site    Assessment & Plan     (I10) Benign essential hypertension  (primary encounter diagnosis)  Comment: patient BP at home <140/90, average ~130/85. Taking only 12.5 of 25 mg Losartan daily prescription. After thorough discussion, will trial taking full 25 mg Losartan. throughly discussed Risks, benefits, side effects and intended purposes of Losartan and when to decrease back to 12.5 mg if needed. Placed BMP for patient to come in and complete, would also like to follow up in 2 months for BP recheck given patient dosage increased to full 25 mg, sooner if acute concerns. Patient fully understands and is agreeable with plan of care and at this point patient will follow up as needed unless acute concerns arise in the meantime.    Plan: Basic metabolic panel  (Ca, Cl, CO2, Creat,         Gluc, K, Na, BUN), PRIMARY CARE FOLLOW-UP         SCHEDULING    (B35.3) Tinea pedis of both feet  Comment: using Lotrimin, suggested different antifungal. Patient would like to give Lotrimin powder a bit longer. Suggested that we could discuss this at 2 month in person follow up if not having any resolution. Patient agreeable with this plan.  Plan: see above.         2 month follow up    No follow-ups on file.   Follow-up Visit   Expected date:  Apr 17, 2023 (Approximate)      Follow Up Appointment Details:     Follow-up with whom?: Me    Follow-Up for what?: Chronic Disease f/u    Chronic Disease f/u: Hypertension    How?: In Person                    MAMADOU Rossi [facility]    Subjective   Alix is a 77 year old, presenting for the following health issues:  Hypertension      History of Present Illness       Hypertension: She presents for follow up of  "hypertension.  She does check blood pressure  regularly outside of the clinic. Outside blood pressures have been over 140/90. She follows a low salt diet.       Hypertension  -Patient very happy with losartan  -Patient has been only taking 12.5 mg  -Taking BP every few days  -Average BP has been 130/85  -No side effects noted    Tinea Pedis  -using lotrimin powder  -feels like not working very effectively.    Review of Systems   Constitutional, HEENT, cardiovascular, pulmonary, gi and gu systems are negative, except as otherwise noted.      Objective    Vitals - Patient Reported  Weight (Patient Reported): 63.5 kg (140 lb)  Height (Patient Reported): 165.1 cm (5' 5\")  BMI (Based on Pt Reported Ht/Wt): 23.3  Pain Score: No Pain (0)      Vitals:  No vitals were obtained today due to virtual visit.    Physical Exam   healthy, alert and no distress  PSYCH: Alert and oriented times 3; coherent speech, normal   rate and volume, able to articulate logical thoughts, able   to abstract reason, no tangential thoughts, no hallucinations   or delusions  Her affect is normal and pleasant  RESP: No cough, no audible wheezing, able to talk in full sentences  Remainder of exam unable to be completed due to telephone visits      Phone call duration: 17 minutes    "

## 2023-02-20 RX ORDER — LOSARTAN POTASSIUM 25 MG/1
25 TABLET ORAL DAILY
Qty: 40 TABLET | Refills: 0 | Status: SHIPPED | OUTPATIENT
Start: 2023-02-20 | End: 2023-03-29

## 2023-02-20 NOTE — TELEPHONE ENCOUNTER
Routing refill request to provider for review/approval because:  Labs out of range:  BP  BP Readings from Last 3 Encounters:   01/13/23 (!) 158/94   11/28/22 130/82   09/22/22 (!) 160/90       Janna Shahid RN

## 2023-02-23 NOTE — TELEPHONE ENCOUNTER
RN called and spoke with pt  -Informed pt of refill for Losartan sent, 40-day supply, pt needing labs for further refills  losartan (COZAAR) 25 MG tablet 40 tablet 0 2/20/2023  No   Sig - Route: Take 1 tablet (25 mg) by mouth daily - Oral     -Scheduled pt for lab only appointment 2/28/23 at 2:30pm for BMP   -Scheduled pt for follow-up/BP check appt with Danielle Borrero PA-C 4/18/23 at 10:00am per Zain Hwang NP recommendation  -Reminded pt of annual wellness exam scheduled with Danielle Borrero PA-C on 5/30/23 at 8:30am    Patient was given an opportunity to ask questions, verbalized understanding of plan, and is agreeable.    Janette WILDER RN  Patient Advocate Liaison - PAL RN  [facility]  [phone]

## 2023-02-23 NOTE — TELEPHONE ENCOUNTER
Zain APRN CNP  Cr Triage 3 days ago     AK  Can we please call patient on having her complete her kidney function panel completed given we just started her on Losartan. I will refill for the time being for short term prescription. Let me know if you have any questions, thanks!

## 2023-02-28 ENCOUNTER — LAB (OUTPATIENT)
Dept: LAB | Facility: CLINIC | Age: 78
End: 2023-02-28
Payer: MEDICARE

## 2023-02-28 DIAGNOSIS — I10 BENIGN ESSENTIAL HYPERTENSION: ICD-10-CM

## 2023-02-28 LAB
ANION GAP SERPL CALCULATED.3IONS-SCNC: 12 MMOL/L (ref 7–15)
BUN SERPL-MCNC: 21.4 MG/DL (ref 8–23)
CALCIUM SERPL-MCNC: 10.9 MG/DL (ref 8.8–10.2)
CHLORIDE SERPL-SCNC: 103 MMOL/L (ref 98–107)
CREAT SERPL-MCNC: 0.8 MG/DL (ref 0.51–0.95)
DEPRECATED HCO3 PLAS-SCNC: 26 MMOL/L (ref 22–29)
GFR SERPL CREATININE-BSD FRML MDRD: 75 ML/MIN/1.73M2
GLUCOSE SERPL-MCNC: 90 MG/DL (ref 70–99)
POTASSIUM SERPL-SCNC: 4.5 MMOL/L (ref 3.4–5.3)
SODIUM SERPL-SCNC: 141 MMOL/L (ref 136–145)

## 2023-02-28 PROCEDURE — 80048 BASIC METABOLIC PNL TOTAL CA: CPT

## 2023-02-28 PROCEDURE — 36415 COLL VENOUS BLD VENIPUNCTURE: CPT

## 2023-03-28 DIAGNOSIS — I10 BENIGN ESSENTIAL HYPERTENSION: ICD-10-CM

## 2023-03-29 DIAGNOSIS — I10 BENIGN ESSENTIAL HYPERTENSION: ICD-10-CM

## 2023-03-29 RX ORDER — LOSARTAN POTASSIUM 25 MG/1
25 TABLET ORAL DAILY
Qty: 30 TABLET | Refills: 0 | Status: SHIPPED | OUTPATIENT
Start: 2023-03-29 | End: 2023-03-30

## 2023-03-29 NOTE — TELEPHONE ENCOUNTER
Routing refill request to provider for review/approval because:  Labs out of range:  BP    BP Readings from Last 3 Encounters:   01/13/23 (!) 158/94   11/28/22 130/82   09/22/22 (!) 160/90     Edy FERRELL RN 3/29/2023 at 2:54 PM

## 2023-03-30 RX ORDER — LOSARTAN POTASSIUM 25 MG/1
TABLET ORAL
Qty: 90 TABLET | Refills: 0 | Status: SHIPPED | OUTPATIENT
Start: 2023-03-30 | End: 2023-04-18

## 2023-03-30 NOTE — TELEPHONE ENCOUNTER
Routing refill request to provider for review/approval because:   Request change in quantity to #90     Last blood pressure under 140/90 in past 12 months    BP Readings from Last 6 Encounters:   01/13/23 (!) 158/94   11/28/22 130/82   09/22/22 (!) 160/90   05/25/22 132/78   08/23/21 128/80   06/25/21 137/84     Saritha Guzman Registered Nurse  [facility]

## 2023-04-07 ENCOUNTER — ANCILLARY PROCEDURE (OUTPATIENT)
Dept: MAMMOGRAPHY | Facility: CLINIC | Age: 78
End: 2023-04-07
Attending: PHYSICIAN ASSISTANT
Payer: MEDICARE

## 2023-04-07 DIAGNOSIS — Z12.31 VISIT FOR SCREENING MAMMOGRAM: ICD-10-CM

## 2023-04-07 PROCEDURE — 77063 BREAST TOMOSYNTHESIS BI: CPT | Mod: TC | Performed by: RADIOLOGY

## 2023-04-07 PROCEDURE — 77067 SCR MAMMO BI INCL CAD: CPT | Mod: TC | Performed by: RADIOLOGY

## 2023-04-13 ENCOUNTER — LAB (OUTPATIENT)
Dept: LAB | Facility: CLINIC | Age: 78
End: 2023-04-13
Payer: MEDICARE

## 2023-04-13 DIAGNOSIS — I10 BENIGN ESSENTIAL HYPERTENSION: ICD-10-CM

## 2023-04-13 LAB
ANION GAP SERPL CALCULATED.3IONS-SCNC: 12 MMOL/L (ref 7–15)
BUN SERPL-MCNC: 25.2 MG/DL (ref 8–23)
CALCIUM SERPL-MCNC: 10.4 MG/DL (ref 8.8–10.2)
CHLORIDE SERPL-SCNC: 102 MMOL/L (ref 98–107)
CREAT SERPL-MCNC: 0.87 MG/DL (ref 0.51–0.95)
DEPRECATED HCO3 PLAS-SCNC: 26 MMOL/L (ref 22–29)
GFR SERPL CREATININE-BSD FRML MDRD: 68 ML/MIN/1.73M2
GLUCOSE SERPL-MCNC: 87 MG/DL (ref 70–99)
POTASSIUM SERPL-SCNC: 4.4 MMOL/L (ref 3.4–5.3)
SODIUM SERPL-SCNC: 140 MMOL/L (ref 136–145)

## 2023-04-13 PROCEDURE — 80048 BASIC METABOLIC PNL TOTAL CA: CPT

## 2023-04-13 PROCEDURE — 36415 COLL VENOUS BLD VENIPUNCTURE: CPT

## 2023-04-18 ENCOUNTER — OFFICE VISIT (OUTPATIENT)
Dept: FAMILY MEDICINE | Facility: CLINIC | Age: 78
End: 2023-04-18
Payer: MEDICARE

## 2023-04-18 VITALS
BODY MASS INDEX: 23.95 KG/M2 | TEMPERATURE: 97.8 F | DIASTOLIC BLOOD PRESSURE: 76 MMHG | RESPIRATION RATE: 16 BRPM | HEART RATE: 71 BPM | SYSTOLIC BLOOD PRESSURE: 128 MMHG | HEIGHT: 66 IN | OXYGEN SATURATION: 98 % | WEIGHT: 149 LBS

## 2023-04-18 DIAGNOSIS — B35.3 TINEA PEDIS OF BOTH FEET: ICD-10-CM

## 2023-04-18 DIAGNOSIS — I10 BENIGN ESSENTIAL HYPERTENSION: ICD-10-CM

## 2023-04-18 PROCEDURE — 99213 OFFICE O/P EST LOW 20 MIN: CPT | Performed by: PHYSICIAN ASSISTANT

## 2023-04-18 RX ORDER — LOSARTAN POTASSIUM 25 MG/1
25 TABLET ORAL DAILY
Qty: 90 TABLET | Refills: 3 | Status: SHIPPED | OUTPATIENT
Start: 2023-04-18 | End: 2024-04-08

## 2023-04-18 RX ORDER — MICONAZOLE NITRATE 20 MG/G
POWDER TOPICAL 2 TIMES DAILY
Qty: 71 G | Refills: 3 | Status: SHIPPED | OUTPATIENT
Start: 2023-04-18 | End: 2024-06-05

## 2023-04-18 NOTE — PROGRESS NOTES
"  Assessment & Plan     Benign essential hypertension  Stable. Labs stable./controlled.   - losartan (COZAAR) 25 MG tablet; Take 1 tablet (25 mg) by mouth daily    Tinea pedis of both feet  Uses as needed   - miconazole (ZEASORB-AF) 2 % external powder; Apply topically 2 times daily                 Danielle Borrero PA-C  [facility] VERNON Thomson is a 77 year old, presenting for the following health issues:  Hypertension        4/18/2023     9:40 AM   Additional Questions   Roomed by April SHAH CMA     History of Present Illness       Hypertension: She presents for follow up of hypertension.  She does check blood pressure  regularly outside of the clinic. Outpatient blood pressures have not been over 140/90. She follows a low salt diet.     She eats 4 or more servings of fruits and vegetables daily.She consumes 0 sweetened beverage(s) daily.She exercises with enough effort to increase her heart rate 10 to 19 minutes per day.  She exercises with enough effort to increase her heart rate 4 days per week.   She is taking medications regularly.       Refill on foot powder.         Review of Systems   Constitutional, HEENT, cardiovascular, pulmonary, gi and gu systems are negative, except as otherwise noted.      Objective    /76 (BP Location: Right arm, Patient Position: Sitting, Cuff Size: Adult Large)   Pulse 71   Temp 97.8  F (36.6  C) (Oral)   Resp 16   Ht 1.664 m (5' 5.5\")   Wt 67.6 kg (149 lb)   SpO2 98%   BMI 24.42 kg/m    Body mass index is 24.42 kg/m .  Physical Exam   GENERAL APPEARANCE: healthy, alert and no distress  RESP: lungs clear to auscultation - no rales, rhonchi or wheezes  CV: regular rates and rhythm, normal S1 S2, no S3 or S4 and no murmur, click or rub                    "

## 2023-05-23 SDOH — ECONOMIC STABILITY: FOOD INSECURITY: WITHIN THE PAST 12 MONTHS, YOU WORRIED THAT YOUR FOOD WOULD RUN OUT BEFORE YOU GOT MONEY TO BUY MORE.: NEVER TRUE

## 2023-05-23 SDOH — HEALTH STABILITY: PHYSICAL HEALTH: ON AVERAGE, HOW MANY MINUTES DO YOU ENGAGE IN EXERCISE AT THIS LEVEL?: 20 MIN

## 2023-05-23 SDOH — ECONOMIC STABILITY: INCOME INSECURITY: IN THE LAST 12 MONTHS, WAS THERE A TIME WHEN YOU WERE NOT ABLE TO PAY THE MORTGAGE OR RENT ON TIME?: NO

## 2023-05-23 SDOH — HEALTH STABILITY: PHYSICAL HEALTH: ON AVERAGE, HOW MANY DAYS PER WEEK DO YOU ENGAGE IN MODERATE TO STRENUOUS EXERCISE (LIKE A BRISK WALK)?: 3 DAYS

## 2023-05-23 SDOH — ECONOMIC STABILITY: FOOD INSECURITY: WITHIN THE PAST 12 MONTHS, THE FOOD YOU BOUGHT JUST DIDN'T LAST AND YOU DIDN'T HAVE MONEY TO GET MORE.: NEVER TRUE

## 2023-05-23 SDOH — ECONOMIC STABILITY: INCOME INSECURITY: HOW HARD IS IT FOR YOU TO PAY FOR THE VERY BASICS LIKE FOOD, HOUSING, MEDICAL CARE, AND HEATING?: NOT HARD AT ALL

## 2023-05-23 ASSESSMENT — SOCIAL DETERMINANTS OF HEALTH (SDOH)
DO YOU BELONG TO ANY CLUBS OR ORGANIZATIONS SUCH AS CHURCH GROUPS UNIONS, FRATERNAL OR ATHLETIC GROUPS, OR SCHOOL GROUPS?: YES
HOW OFTEN DO YOU ATTEND CHURCH OR RELIGIOUS SERVICES?: MORE THAN 4 TIMES PER YEAR
IN A TYPICAL WEEK, HOW MANY TIMES DO YOU TALK ON THE PHONE WITH FAMILY, FRIENDS, OR NEIGHBORS?: THREE TIMES A WEEK
HOW OFTEN DO YOU GET TOGETHER WITH FRIENDS OR RELATIVES?: TWICE A WEEK

## 2023-05-23 ASSESSMENT — LIFESTYLE VARIABLES
SKIP TO QUESTIONS 9-10: 1
AUDIT-C TOTAL SCORE: 0
HOW MANY STANDARD DRINKS CONTAINING ALCOHOL DO YOU HAVE ON A TYPICAL DAY: PATIENT DOES NOT DRINK
HOW OFTEN DO YOU HAVE SIX OR MORE DRINKS ON ONE OCCASION: NEVER
HOW OFTEN DO YOU HAVE A DRINK CONTAINING ALCOHOL: NEVER

## 2023-05-23 ASSESSMENT — ENCOUNTER SYMPTOMS
HEARTBURN: 0
SHORTNESS OF BREATH: 0
COUGH: 0
NAUSEA: 0
CONSTIPATION: 0
SORE THROAT: 0
JOINT SWELLING: 1
HEADACHES: 0
HEMATURIA: 0
CHILLS: 0
PALPITATIONS: 0
EYE PAIN: 0
ARTHRALGIAS: 0
NERVOUS/ANXIOUS: 0
HEMATOCHEZIA: 0
WEAKNESS: 0
DYSURIA: 0
BREAST MASS: 0
ABDOMINAL PAIN: 0
MYALGIAS: 0
FEVER: 0
FREQUENCY: 0
DIZZINESS: 0
DIARRHEA: 0
PARESTHESIAS: 1

## 2023-05-23 ASSESSMENT — ACTIVITIES OF DAILY LIVING (ADL): CURRENT_FUNCTION: NO ASSISTANCE NEEDED

## 2023-05-30 ENCOUNTER — OFFICE VISIT (OUTPATIENT)
Dept: FAMILY MEDICINE | Facility: CLINIC | Age: 78
End: 2023-05-30
Attending: PHYSICIAN ASSISTANT
Payer: MEDICARE

## 2023-05-30 VITALS
TEMPERATURE: 97.9 F | BODY MASS INDEX: 23.98 KG/M2 | HEIGHT: 66 IN | RESPIRATION RATE: 12 BRPM | OXYGEN SATURATION: 97 % | HEART RATE: 72 BPM | SYSTOLIC BLOOD PRESSURE: 116 MMHG | DIASTOLIC BLOOD PRESSURE: 72 MMHG | WEIGHT: 149.2 LBS

## 2023-05-30 DIAGNOSIS — Z00.00 ENCOUNTER FOR MEDICARE ANNUAL WELLNESS EXAM: Primary | ICD-10-CM

## 2023-05-30 DIAGNOSIS — E78.5 HYPERLIPIDEMIA LDL GOAL <160: ICD-10-CM

## 2023-05-30 DIAGNOSIS — M25.551 BILATERAL HIP PAIN: ICD-10-CM

## 2023-05-30 DIAGNOSIS — M25.552 BILATERAL HIP PAIN: ICD-10-CM

## 2023-05-30 LAB
CHOLEST SERPL-MCNC: 237 MG/DL
HDLC SERPL-MCNC: 83 MG/DL
LDLC SERPL CALC-MCNC: 138 MG/DL
NONHDLC SERPL-MCNC: 154 MG/DL
TRIGL SERPL-MCNC: 82 MG/DL

## 2023-05-30 PROCEDURE — 91312 COVID-19 BIVALENT 12+ (PFIZER): CPT | Performed by: PHYSICIAN ASSISTANT

## 2023-05-30 PROCEDURE — 36415 COLL VENOUS BLD VENIPUNCTURE: CPT | Performed by: PHYSICIAN ASSISTANT

## 2023-05-30 PROCEDURE — 80061 LIPID PANEL: CPT | Performed by: PHYSICIAN ASSISTANT

## 2023-05-30 PROCEDURE — 0124A COVID-19 BIVALENT 12+ (PFIZER): CPT | Performed by: PHYSICIAN ASSISTANT

## 2023-05-30 PROCEDURE — G0439 PPPS, SUBSEQ VISIT: HCPCS | Performed by: PHYSICIAN ASSISTANT

## 2023-05-30 ASSESSMENT — ENCOUNTER SYMPTOMS
DYSURIA: 0
HEADACHES: 0
BREAST MASS: 0
CHILLS: 0
PALPITATIONS: 0
HEMATOCHEZIA: 0
PARESTHESIAS: 1
CONSTIPATION: 0
ARTHRALGIAS: 0
EYE PAIN: 0
SHORTNESS OF BREATH: 0
DIARRHEA: 0
FEVER: 0
NERVOUS/ANXIOUS: 0
ABDOMINAL PAIN: 0
COUGH: 0
NAUSEA: 0
JOINT SWELLING: 1
MYALGIAS: 0
WEAKNESS: 0
SORE THROAT: 0
FREQUENCY: 0
HEMATURIA: 0
HEARTBURN: 0
DIZZINESS: 0

## 2023-05-30 ASSESSMENT — ACTIVITIES OF DAILY LIVING (ADL): CURRENT_FUNCTION: NO ASSISTANCE NEEDED

## 2023-05-30 NOTE — PROGRESS NOTES
"SUBJECTIVE:   Alix is a 77 year old who presents for Preventive Visit.      5/30/2023     8:11 AM   Additional Questions   Roomed by Nicole Esparza   Accompanied by ELLI   Patient has been advised of split billing requirements and indicates understanding: Yes  Are you in the first 12 months of your Medicare coverage?  No    Healthy Habits:    In general, how would you rate your overall health?  Good    Frequency of exercise:  4-5 days/week    Duration of exercise:  15-30 minutes    Do you usually eat at least 4 servings of fruit and vegetables a day, include whole grains    & fiber and avoid regularly eating high fat or \"junk\" foods?  Yes    Taking medications regularly:  Yes    Medication side effects:  Not applicable    Ability to successfully perform activities of daily living:  No assistance needed    Home Safety:  No safety concerns identified    Hearing Impairment:  No hearing concerns    In the past 6 months, have you been bothered by leaking of urine? Yes    In general, how would you rate your overall mental or emotional health?  Good      PHQ-2 Total Score:    Additional concerns today:  Yes        Have you ever done Advance Care Planning? (For example, a Health Directive, POLST, or a discussion with a medical provider or your loved ones about your wishes): Yes, advance care planning is on file.       Fall risk  Fallen 2 or more times in the past year?: No  Any fall with injury in the past year?: No    Cognitive Screening   1) Repeat 3 items (Leader, Season, Table)    2) Clock draw: NORMAL  3) 3 item recall: Recalls 3 objects  Results: 3 items recalled: COGNITIVE IMPAIRMENT LESS LIKELY    Mini-CogTM Copyright ANNA Simmons. Licensed by the author for use in [facility]; reprinted with permission (arik@.[facility]). All rights reserved.      Do you have sleep apnea, excessive snoring or daytime drowsiness?: no    Reviewed and updated as needed this visit by clinical staff   Tobacco  Allergies  Meds      "         Reviewed and updated as needed this visit by Provider                 Social History     Tobacco Use     Smoking status: Never     Smokeless tobacco: Never   Vaping Use     Vaping status: Never Used     Passive vaping exposure: Yes   Substance Use Topics     Alcohol use: No             5/23/2023     6:59 PM   Alcohol Use   Prescreen: >3 drinks/day or >7 drinks/week? Not Applicable     Do you have a current opioid prescription? No  Do you use any other controlled substances or medications that are not prescribed by a provider? None          Joint Pain    Onset: Couple of months     Description:   Location: left hip and right hip  Character: Tingling     Intensity: mild    Progression of Symptoms: worse    Accompanying Signs & Symptoms:  Other symptoms: tingling    History:   Previous similar pain: no       Precipitating factors:   Trauma or overuse: no     Alleviating factors:  Improved by: massage    Therapies Tried and outcome: massage and movement     Prior to immunization administration, verified patients identity using patient s name and date of birth. Please see Immunization Activity for additional information.     Screening Questionnaire for Adult Immunization    Are you sick today?   No   Do you have allergies to medications, food, a vaccine component or latex?   No   Have you ever had a serious reaction after receiving a vaccination?   No   Do you have a long-term health problem with heart, lung, kidney, or metabolic disease (e.g., diabetes), asthma, a blood disorder, no spleen, complement component deficiency, a cochlear implant, or a spinal fluid leak?  Are you on long-term aspirin therapy?   No   Do you have cancer, leukemia, HIV/AIDS, or any other immune system problem?   No   Do you have a parent, brother, or sister with an immune system problem?   No   In the past 3 months, have you taken medications that affect  your immune system, such as prednisone, other steroids, or anticancer drugs; drugs  for the treatment of rheumatoid arthritis, Crohn s disease, or psoriasis; or have you had radiation treatments?   No   Have you had a seizure, or a brain or other nervous system problem?   No   During the past year, have you received a transfusion of blood or blood    products, or been given immune (gamma) globulin or antiviral drug?   No   For women: Are you pregnant or is there a chance you could become       pregnant during the next month?   No   Have you received any vaccinations in the past 4 weeks?   No     Immunization questionnaire answers were all negative.      Injection of Covid-19-Pfizer given by Nicole Luna. Patient instructed to remain in clinic for 15 minutes afterwards, and to report any adverse reactions.     Screening performed by Nicole Luna on 5/30/2023 at 8:17 AM.        Current providers sharing in care for this patient include:   Patient Care Team:  Danielle Borrero PA-C as PCP - General (Physician Assistant)  Brent Casas MD as Family Medicine Physician (Family Practice)  Danielle Borrero PA-C as Assigned PCP    The following health maintenance items are reviewed in Epic and correct as of today:  Health Maintenance   Topic Date Due     COVID-19 Vaccine (6 - Pfizer series) 03/01/2023     LIPID  05/25/2023     MEDICARE ANNUAL WELLNESS VISIT  05/25/2023     COLORECTAL CANCER SCREENING  10/01/2023     MAMMO SCREENING  04/07/2024     ANNUAL REVIEW OF HM ORDERS  05/30/2024     FALL RISK ASSESSMENT  05/30/2024     DEXA  02/11/2028     ADVANCE CARE PLANNING  05/30/2028     DTAP/TDAP/TD IMMUNIZATION (3 - Td or Tdap) 07/03/2028     HEPATITIS C SCREENING  Completed     PHQ-2 (once per calendar year)  Completed     INFLUENZA VACCINE  Completed     Pneumococcal Vaccine: 65+ Years  Completed     ZOSTER IMMUNIZATION  Completed     IPV IMMUNIZATION  Aged Out     MENINGITIS IMMUNIZATION  Aged Out     Lab work is in process          Pertinent mammograms are reviewed under the  "imaging tab.    Review of Systems   Constitutional: Negative for chills and fever.   HENT: Negative for congestion, ear pain, hearing loss and sore throat.    Eyes: Negative for pain and visual disturbance.   Respiratory: Negative for cough and shortness of breath.    Cardiovascular: Positive for peripheral edema. Negative for chest pain and palpitations.   Gastrointestinal: Negative for abdominal pain, constipation, diarrhea, heartburn, hematochezia and nausea.   Breasts:  Negative for tenderness, breast mass and discharge.   Genitourinary: Negative for dysuria, frequency, genital sores, hematuria, pelvic pain, urgency, vaginal bleeding and vaginal discharge.   Musculoskeletal: Positive for joint swelling. Negative for arthralgias and myalgias.   Skin: Negative for rash.   Neurological: Positive for paresthesias. Negative for dizziness, weakness and headaches.   Psychiatric/Behavioral: Negative for mood changes. The patient is not nervous/anxious.          OBJECTIVE:   /72 (BP Location: Right arm, Patient Position: Sitting, Cuff Size: Adult Regular)   Pulse 72   Temp 97.9  F (36.6  C) (Oral)   Resp 12   Ht 1.664 m (5' 5.5\")   Wt 67.7 kg (149 lb 3.2 oz)   SpO2 97%   BMI 24.45 kg/m   Estimated body mass index is 24.45 kg/m  as calculated from the following:    Height as of this encounter: 1.664 m (5' 5.5\").    Weight as of this encounter: 67.7 kg (149 lb 3.2 oz).  Physical Exam  GENERAL APPEARANCE: healthy, alert and no distress  EYES: Eyes grossly normal to inspection, PERRL and conjunctivae and sclerae normal  RESP: lungs clear to auscultation - no rales, rhonchi or wheezes  CV: regular rate and rhythm, normal S1 S2, no S3 or S4, no murmur, click or rub, no peripheral edema and peripheral pulses strong  MS: gait is age appropriate without ataxia, TTP over left greater trochanter and Left SI joint  PSYCH: mentation appears normal and affect normal/bright    Diagnostic Test Results:  Labs reviewed in " Epic    ASSESSMENT / PLAN:   (Z00.00) Encounter for Medicare annual wellness exam  (primary encounter diagnosis)  Comment:   Plan: AR ANNUAL WELLNESS VISIT, PPS, SUBSEQUENT            (E78.5) Hyperlipidemia LDL goal <160  Comment: await labs.   Plan: Lipid panel reflex to direct LDL Non-fasting            (M25.551,  M25.552) Bilateral hip pain  Comment: would like consult. Taking NSAIDS  Plan: Orthopedic  Referral              Patient has been advised of split billing requirements and indicates understanding: No      COUNSELING:  Reviewed preventive health counseling, as reflected in patient instructions       Regular exercise       Healthy diet/nutrition       Vision screening       Immunizations    Vaccinated for: Covid-19              She reports that she has never smoked. She has never used smokeless tobacco.      Appropriate preventive services were discussed with this patient, including applicable screening as appropriate for cardiovascular disease, diabetes, osteopenia/osteoporosis, and glaucoma.  As appropriate for age/gender, discussed screening for colorectal cancer, prostate cancer, breast cancer, and cervical cancer. Checklist reviewing preventive services available has been given to the patient.    Reviewed patients plan of care and provided an AVS. The Basic Care Plan (routine screening as documented in Health Maintenance) for Alix meets the Care Plan requirement. This Care Plan has been established and reviewed with the Patient.          ALFRED Marcos [facility]    Identified Health Risks:    I have reviewed Opioid Use Disorder and Substance Use Disorder risk factors and made any needed referrals.

## 2023-05-30 NOTE — PATIENT INSTRUCTIONS
Patient Education   Personalized Prevention Plan  You are due for the preventive services outlined below.  Your care team is available to assist you in scheduling these services.  If you have already completed any of these items, please share that information with your care team to update in your medical record.  Health Maintenance Due   Topic Date Due     COVID-19 Vaccine (6 - Pfizer series) 03/01/2023     Cholesterol Lab  05/25/2023     ANNUAL REVIEW OF HM ORDERS  05/25/2023     Annual Wellness Visit  05/25/2023

## 2023-06-09 NOTE — PROGRESS NOTES
[facility]  Sports Medicine Clinic           ASSESSMENT and PLAN:     Alix was seen today for pain and pain.    Diagnoses and all orders for this visit:    Bilateral hip pain: Tingling sensation that is not painful to the patient, and resolves with position changes.  Although not entirely clear etiology she does have some IT band tightness, and some tenderness over the distribution of her IT band.  This may be causing some local nerve compression resulting in tingling sensation.  Her hip x-rays look good and do not suspect that this is referred from intra-articular pathology.  -     XR Pelvis w bilateral hips 1 View; Future  -     Work on IT band tightness with , if she changes her mind can call for referral to physical therapy    Return sooner if develops new or worsening symptoms.    Options for treatment and/or follow-up care were reviewed with the patient was actively involved in the decision making process. Patient verbalized understanding and was in agreement with the plan.    Bhavana Hines MD, [facility]  Primary Care Sports Medicine             SUBJECTIVE       Alix Escobar is a 77 year old female presenting to clinic today with a chief complaint of bilateral hip pain, referred by NOHEMY Louis.    No specific injury to report, right hip is worse than left.  She reports tingling that stays in the hip area.  Symptoms are worse when lying on side, tingling occurs on side that is up when lying on side.  She has tried putting a pillow between knees, but it has not helped.  She reports pain started about 3-6 months ago.  Treatments tried: none.  Current pain 1/10, tingling is what is bothering her the most. She has full ROM.    She has a history of some hip pain and does do exercises that help.       PMH, Medications and Allergies were reviewed and updated as needed.    ROS:  As noted above otherwise negative.    Patient Active Problem List   Diagnosis     Hyperlipidemia LDL goal  <160     Advance Care Planning     Cervicalgia     Osteopenia     Gastroesophageal reflux disease, esophagitis presence not specified     Onychomycosis     Radial scar of left breast     Elevated blood pressure reading without diagnosis of hypertension     Benign essential hypertension     Tinea pedis of both feet       Current Outpatient Medications   Medication Sig Dispense Refill     calcium citrate-vitamin D (CITRACAL) 315-200 MG-UNIT TABS Take 1 tablet by mouth daily       Capsicum, Cayenne, (CAYENNE PO) Take 2 tablets by mouth daily       co-enzyme Q-10 50 MG CAPS Take 1 tablet by mouth daily       losartan (COZAAR) 25 MG tablet Take 1 tablet (25 mg) by mouth daily 90 tablet 3     miconazole (ZEASORB-AF) 2 % external powder Apply topically 2 times daily 71 g 3     multivitamin w/minerals (THERA-VIT-M) tablet Take 1 tablet by mouth daily       naproxen sodium (ANAPROX) 220 MG tablet Take 220 mg by mouth 2 times daily (with meals)              OBJECTIVE:       Vitals:   Vitals:    06/14/23 0854   BP: 112/82   Weight: 70.8 kg (156 lb)     BMI: Body mass index is 25.56 kg/m .    Gen:  Well nourished and in no acute distress  HEENT: Extraocular movement intact  Neck: Supple  Pulm:  Breathing Comfortably. No increased respiratory effort.  Psych: Euthymic. Appropriately answers questions    MSK: Normal gait.  Able to toe walk and heel walk.  Slightly decreased glutes strength, however otherwise good strength of the lower extremity bilaterally.  Sensation intact.  Neurovascularly intact.  Slight tenderness to palpation over the IT band at the hip as well as the lateral knee.  Positive Zuleima test.  Good range of motion of the hips bilaterally.      XRAY bilateral hips and pelvis (6/14/2023): No significant degenerative changes, joint spaces preserved, no acute fracture.     Imaging was personally reviewed and interpreted by me.

## 2023-06-14 ENCOUNTER — OFFICE VISIT (OUTPATIENT)
Dept: ORTHOPEDICS | Facility: CLINIC | Age: 78
End: 2023-06-14
Attending: PHYSICIAN ASSISTANT
Payer: MEDICARE

## 2023-06-14 ENCOUNTER — ANCILLARY PROCEDURE (OUTPATIENT)
Dept: GENERAL RADIOLOGY | Facility: CLINIC | Age: 78
End: 2023-06-14
Payer: MEDICARE

## 2023-06-14 VITALS — DIASTOLIC BLOOD PRESSURE: 82 MMHG | BODY MASS INDEX: 25.56 KG/M2 | SYSTOLIC BLOOD PRESSURE: 112 MMHG | WEIGHT: 156 LBS

## 2023-06-14 DIAGNOSIS — M25.551 BILATERAL HIP PAIN: ICD-10-CM

## 2023-06-14 DIAGNOSIS — M25.552 BILATERAL HIP PAIN: ICD-10-CM

## 2023-06-14 PROCEDURE — 99203 OFFICE O/P NEW LOW 30 MIN: CPT | Performed by: STUDENT IN AN ORGANIZED HEALTH CARE EDUCATION/TRAINING PROGRAM

## 2023-06-14 PROCEDURE — 73522 X-RAY EXAM HIPS BI 3-4 VIEWS: CPT | Mod: TC | Performed by: RADIOLOGY

## 2023-06-14 NOTE — PATIENT INSTRUCTIONS
Please call [phone] with questions or concerns.    Work with your  on your IT Band. If they would prefer you go to PT please let us know and we can put in a referral.    Patient Education     Iliotibial Band Stretch (Flexibility)     Stand next to a chair. Hold onto the chair with your right hand for support. Cross your right leg behind your left leg.  Lean your right hip toward the right. Feel the stretch at the outside of your hip.  Hold for 30 to 60 seconds. Then relax.  Repeat 2 times, or as instructed.  Switch sides and repeat.  Do this 3 times a day, or as instructed.     Tip: Don t bend forward or twist at the waist.   [employer] last reviewed this educational content on 3/1/2020    9776-9750 [employer]. All rights reserved. This information is not intended as a substitute for professional medical care. Always follow your healthcare professional's instructions.  Patient Education     Understanding Iliotibial Band Syndrome  Iliotibial band syndrome, or IT band syndrome, is a condition that causes pain on the outside of the knee. It most often occurs in athletes, especially long-distance runners. It can also happen if you cycle, ski, row, or play soccer. It can also occur in people who are just starting to exercise.   What is the IT band?  The iliotibial (IT) band is a strong, thick band of tissue that runs down the outside of your thigh. It goes all the way from your hipbones to the top of your shinbone (tibia), just below your knee joint. The bones of your knee joint include your tibia, your thighbone (femur), and your kneecap (patella).   When you bend and straighten your leg, the IT band moves over the outer lower edge of your femur. Over time, bending and straightening your leg can cause the IT band to irritate nearby tissues and cause pain.     What causes IT band syndrome?  Researchers are still learning the exact cause of IT band syndrome. The pain may be caused by the  IT band rubbing over the lower outer edge of the femur. This may cause inflammation in the bone, tendons, and small fluid-filled sacs (bursa) in the area. The IT band may also squeeze (compress) the tissue under it and cause pain.   If you are a runner, you might be more likely to develop IT band syndrome if:  You run on uneven ground or down hills  You run on worn-out shoes  You run many miles per day  Your legs bend in a little from your knee to your ankle (bowlegs)  What are the symptoms of IT band syndrome?  IT band syndrome causes pain on the outside of the knee or side of the thigh. It might affect one or both of your knees. The pain is an aching, burning feeling that can spread up the thigh to the hip. You may feel this pain only when you exercise, such as while running. The pain may be worst right after you step on your foot. It may only happen near the end of your exercise. As the condition gets worse, pain may start earlier and continue after you have stopped exercising. Going up and down the stairs may make the pain worse.   How is IT band syndrome diagnosed?  Your healthcare provider will ask about your health history and your symptoms. He or she will give you a physical exam. This will include an exam of your knee. The range of motion and strength of your knee will be tested. The provider will look for areas of pain around your knee, thigh, and hip. The symptoms of IT band syndrome can be like those of osteoarthritis or a meniscal tear. Your provider will need to make sure IT band syndrome is the cause of your symptoms. If the diagnosis is not clear, you may need imaging tests. These can include an X-ray or MRI scan.   [employer] last reviewed this educational content on 4/1/2020 2000-2022 [employer]. All rights reserved. This information is not intended as a substitute for professional medical care. Always follow your healthcare professional's instructions.      Bhavana Hines MD,  CAQSM  Primary Care Sports Medicine

## 2023-11-13 NOTE — PROGRESS NOTES
COVID-19 PCR test completed. Patient handout For Patients Who Have Been Tested for Covid-19 (Coronavirus) was given to the patient, which includes test result notification process.  
Female

## 2024-02-14 ENCOUNTER — E-VISIT (OUTPATIENT)
Dept: FAMILY MEDICINE | Facility: CLINIC | Age: 79
End: 2024-02-14
Payer: MEDICARE

## 2024-02-14 DIAGNOSIS — U07.1 INFECTION DUE TO 2019 NOVEL CORONAVIRUS: Primary | ICD-10-CM

## 2024-02-14 PROCEDURE — 99207 PR NON-BILLABLE SERV PER CHARTING: CPT | Performed by: PHYSICIAN ASSISTANT

## 2024-02-14 RX ORDER — FLUTICASONE PROPIONATE 50 MCG
1 SPRAY, SUSPENSION (ML) NASAL DAILY
Qty: 16 G | Refills: 1 | Status: SHIPPED | OUTPATIENT
Start: 2024-02-14 | End: 2024-06-05

## 2024-02-14 RX ORDER — BENZONATATE 200 MG/1
200 CAPSULE ORAL 3 TIMES DAILY PRN
Qty: 30 CAPSULE | Refills: 0 | Status: SHIPPED | OUTPATIENT
Start: 2024-02-14 | End: 2024-03-05

## 2024-02-14 NOTE — PATIENT INSTRUCTIONS
Thank you for choosing us for your care. I have placed an order for a prescription so that you can start treatment. View your full visit summary for details by clicking on the link below. Your pharmacist will able to address any questions you may have about the medication.     If you're not feeling better within 5-7 days, please schedule an appointment.  You can schedule an appointment right here in [facility], or call [phone]  If the visit is for the same symptoms as your eVisit, we'll refund the cost of your eVisit if seen within seven days.

## 2024-02-21 ENCOUNTER — OFFICE VISIT (OUTPATIENT)
Dept: URGENT CARE | Facility: URGENT CARE | Age: 79
End: 2024-02-21
Payer: MEDICARE

## 2024-02-21 VITALS
DIASTOLIC BLOOD PRESSURE: 70 MMHG | OXYGEN SATURATION: 100 % | TEMPERATURE: 99.2 F | WEIGHT: 158 LBS | HEART RATE: 98 BPM | SYSTOLIC BLOOD PRESSURE: 122 MMHG | BODY MASS INDEX: 25.89 KG/M2

## 2024-02-21 DIAGNOSIS — H66.91 RIGHT OTITIS MEDIA, UNSPECIFIED OTITIS MEDIA TYPE: Primary | ICD-10-CM

## 2024-02-21 DIAGNOSIS — H61.23 BILATERAL IMPACTED CERUMEN: ICD-10-CM

## 2024-02-21 PROCEDURE — 69209 REMOVE IMPACTED EAR WAX UNI: CPT | Performed by: FAMILY MEDICINE

## 2024-02-21 PROCEDURE — 99213 OFFICE O/P EST LOW 20 MIN: CPT | Mod: 25 | Performed by: FAMILY MEDICINE

## 2024-02-21 RX ORDER — AMOXICILLIN 875 MG
875 TABLET ORAL 2 TIMES DAILY
Qty: 20 TABLET | Refills: 0 | Status: SHIPPED | OUTPATIENT
Start: 2024-02-21 | End: 2024-03-02

## 2024-02-21 NOTE — PROGRESS NOTES
ICD-10-CM    1. Right otitis media, unspecified otitis media type  H66.91 amoxicillin (AMOXIL) 875 MG tablet      2. Bilateral impacted cerumen  H61.23 GA REMOVAL IMPACTED CERUMEN IRRIGATION/LVG SHAYY BARBOUR performed bilateral earwash with excellent clearance of cerumen from both canals.  TMs intact following the procedure.  R TM is noted to be erythematous and to have a moderate purulent effusion.  Canals appear normal.    PLAN:  Patient Instructions   Amoxicillin twice daily for 10 days for right ear infection.    Use 1-2 tablets of Aleve twice daily for the next 2 days to help with pain and inflammation.  OK to continue Flonase but be gentle when blowing your nose. :)    SUBJECTIVE:  Alix Escobar is a 78 year old female who presents to  today with suddenly worsening R ear pain.  Had COVID starting February 6 and has had ongoing runny nose.  Yesterday developed sharp, intense R ear pain.  Tried an OTC homeopathic pain-relieving ear drop with minimal improvement.  Has been using Aleve as well.  No purulent drainage.    OBJECTIVE:  /70   Pulse 98   Temp 99.2  F (37.3  C) (Tympanic)   Wt 71.7 kg (158 lb)   SpO2 100%   BMI 25.89 kg/m    GEN: well-appearing, in NAD  ENT: TMs blocked by cerumen bilaterally

## 2024-02-21 NOTE — PATIENT INSTRUCTIONS
Amoxicillin twice daily for 10 days for right ear infection.    Use 1-2 tablets of Aleve twice daily for the next 2 days to help with pain and inflammation.  OK to continue Flonase but be gentle when blowing your nose. :)

## 2024-03-04 ENCOUNTER — E-VISIT (OUTPATIENT)
Dept: FAMILY MEDICINE | Facility: CLINIC | Age: 79
End: 2024-03-04
Payer: MEDICARE

## 2024-03-04 ENCOUNTER — TELEPHONE (OUTPATIENT)
Dept: FAMILY MEDICINE | Facility: CLINIC | Age: 79
End: 2024-03-04

## 2024-03-04 DIAGNOSIS — H92.03 OTALGIA, BILATERAL: Primary | ICD-10-CM

## 2024-03-04 PROCEDURE — 99207 PR NON-BILLABLE SERV PER CHARTING: CPT | Performed by: PHYSICIAN ASSISTANT

## 2024-03-04 NOTE — PATIENT INSTRUCTIONS
Dear Alix,    We are sorry you are not feeling well. Based on the responses you provided, it is recommended that you be seen in-person in clinic so we can better evaluate your symptoms. Please schedule this visit in [employer]. You will have a Schedule Now button in [employer] to help with scheduling this appointment. Otherwise, you can call [phone] to schedule an appointment.     You will not be charged for this eVisit. Thank you for trusting us with your care.     Danielle Borrero PA-C

## 2024-03-04 NOTE — TELEPHONE ENCOUNTER
Please get pt in this week in person with any provider to have her ears checked. Was just in urgent care and given abx. Still having bothersome ear issues.

## 2024-03-05 ENCOUNTER — OFFICE VISIT (OUTPATIENT)
Dept: FAMILY MEDICINE | Facility: CLINIC | Age: 79
End: 2024-03-05
Payer: MEDICARE

## 2024-03-05 VITALS
HEIGHT: 65 IN | SYSTOLIC BLOOD PRESSURE: 130 MMHG | HEART RATE: 71 BPM | DIASTOLIC BLOOD PRESSURE: 83 MMHG | TEMPERATURE: 97.6 F | BODY MASS INDEX: 26.49 KG/M2 | RESPIRATION RATE: 10 BRPM | OXYGEN SATURATION: 99 % | WEIGHT: 159 LBS

## 2024-03-05 DIAGNOSIS — H69.91 DYSFUNCTION OF RIGHT EUSTACHIAN TUBE: Primary | ICD-10-CM

## 2024-03-05 DIAGNOSIS — H73.891 RETRACTION OF TYMPANIC MEMBRANE OF RIGHT EAR: ICD-10-CM

## 2024-03-05 PROCEDURE — 99213 OFFICE O/P EST LOW 20 MIN: CPT | Performed by: PHYSICIAN ASSISTANT

## 2024-03-05 RX ORDER — RESPIRATORY SYNCYTIAL VIRUS VACCINE 120MCG/0.5
0.5 KIT INTRAMUSCULAR ONCE
Qty: 1 EACH | Refills: 0 | Status: CANCELLED | OUTPATIENT
Start: 2024-03-05 | End: 2024-03-05

## 2024-03-05 RX ORDER — PREDNISONE 20 MG/1
20 TABLET ORAL DAILY
Qty: 5 TABLET | Refills: 0 | Status: SHIPPED | OUTPATIENT
Start: 2024-03-05 | End: 2024-03-10

## 2024-03-05 ASSESSMENT — ENCOUNTER SYMPTOMS: HEADACHES: 1

## 2024-03-05 NOTE — PATIENT INSTRUCTIONS
Start Flonase and complete prednisone burst.  Call clinic if symptoms not improving for referral to ENT.

## 2024-03-05 NOTE — PROGRESS NOTES
"  Assessment & Plan     Dysfunction of right eustachian tube  Patient no longer has an infection but ear drum is still retracted. We will try a prednisone burst. Offered more gradual treatment with Flonase first but she declines. We will treat with both at the same time. Follow up with ENT if not improving.  - predniSONE (DELTASONE) 20 MG tablet; Take 1 tablet (20 mg) by mouth daily for 5 days    Retraction of tympanic membrane of right ear      Review of external notes as documented elsewhere in note  Prescription drug management      Graham Thomson is a 78 year old, presenting for the following health issues:  Ear Problem and Urgent Care      3/5/2024     1:08 PM   Additional Questions   Roomed by AT     Ear Problem  This is a new problem. The current episode started more than 1 week ago. There is pain in the right ear. The problem has not changed since onset.There has been no fever. The pain is at a severity of 5/10. The pain is mild. Associated symptoms include headaches and hearing loss.   History of Present Illness       Reason for visit:  Ear infection  Symptom onset:  3-4 weeks ago  Symptom intensity:  Mild  Symptom progression:  Improving  Had these symptoms before:  No    She eats 4 or more servings of fruits and vegetables daily.She consumes 0 sweetened beverage(s) daily.She exercises with enough effort to increase her heart rate 10 to 19 minutes per day.  She exercises with enough effort to increase her heart rate 4 days per week.   She is taking medications regularly.     ED/UC Followup:    Facility:  [facility]  Date of visit: 2/21/24  Reason for visit: Right otitis media, unspecified otitis media type, Bilateral impacted cerumen  Current Status: Patient is doing better, still experiencing slight occasional pain, \"plugged sensation\", slight muffled hearing  Treated with amoxicillin 875 mg for ear infection. Ear wash performed during urgent care with improvement of " "cerumen impaction.         Objective    /83 (BP Location: Left arm, Patient Position: Chair, Cuff Size: Adult Regular)   Pulse 71   Temp 97.6  F (36.4  C) (Oral)   Resp 10   Ht 1.651 m (5' 5\")   Wt 72.1 kg (159 lb)   SpO2 99%   BMI 26.46 kg/m    Body mass index is 26.46 kg/m .  Physical Exam   GENERAL: No acute distress  HEENT: Normocephalic, Canals patent, bilateral TM's non-erythematous, right TM retracted.  NEURO: Alert and non-focal          Signed Electronically by: Carol Yang PA-C    "

## 2024-03-08 ENCOUNTER — PATIENT OUTREACH (OUTPATIENT)
Dept: CARE COORDINATION | Facility: CLINIC | Age: 79
End: 2024-03-08
Payer: MEDICARE

## 2024-03-20 ENCOUNTER — MYC MEDICAL ADVICE (OUTPATIENT)
Dept: FAMILY MEDICINE | Facility: CLINIC | Age: 79
End: 2024-03-20
Payer: MEDICARE

## 2024-03-20 DIAGNOSIS — Z12.11 SCREEN FOR COLON CANCER: Primary | ICD-10-CM

## 2024-03-21 NOTE — TELEPHONE ENCOUNTER
See Mychart message and advise, pt has upcoming appointments scheduled  Alicia Phillip RN, BSN  [facility]

## 2024-03-28 ENCOUNTER — TELEPHONE (OUTPATIENT)
Dept: GASTROENTEROLOGY | Facility: CLINIC | Age: 79
End: 2024-03-28
Payer: MEDICARE

## 2024-03-28 NOTE — TELEPHONE ENCOUNTER
"Endoscopy Scheduling Screen    Have you had a positive Covid test in the last 14 days?  No    What is your communication preference for Instructions and/or Bowel Prep?   MyChart    What insurance is in the chart?  Other:  medicare/aarp     Ordering/Referring Provider: RAJESH RIOS     (If ordering provider performs procedure, schedule with ordering provider unless otherwise instructed. )    BMI: Estimated body mass index is 26.46 kg/m  as calculated from the following:    Height as of 3/5/24: 1.651 m (5' 5\").    Weight as of 3/5/24: 72.1 kg (159 lb).     Sedation Ordered  moderate sedation.   If patient BMI > 50 do not schedule in [facility].    If patient BMI > 45 do not schedule at [facility].    Are you taking methadone or Suboxone?  No    Have you had difficulties, pain, or discomfort during past endoscopy procedures?  No    Are you taking any prescription medications for pain 3 or more times per week?   NO, No RN review required.    Do you have a history of malignant hyperthermia?  No    (Females) Are you currently pregnant?   No     Have you been diagnosed or told you have pulmonary hypertension?   No    Do you have an LVAD?  No    Have you been told you have moderate to severe sleep apnea?  No    Have you been told you have COPD, asthma, or any other lung disease?  No    Do you have any heart conditions?  No     Have you ever had or are you waiting for an organ transplant?  No. Continue scheduling, no site restrictions.    Have you had a stroke or transient ischemic attack (TIA aka \"mini stroke\" in the last 6 months?   No    Have you been diagnosed with or been told you have cirrhosis of the liver?   No    Are you currently on dialysis?   No    Do you need assistance transferring?   No    BMI: Estimated body mass index is 26.46 kg/m  as calculated from the following:    Height as of 3/5/24: 1.651 m (5' 5\").    Weight as of 3/5/24: 72.1 kg (159 lb).     Is patients BMI > 40 and scheduling location [facility]?  No    Do you " take an injectable medication for weight loss or diabetes (excluding insulin)?  No    Do you take the medication Naltrexone?  No    Do you take blood thinners?  No       Prep   Are you currently on dialysis or do you have chronic kidney disease?  No    Do you have a diagnosis of diabetes?  No    Do you have a diagnosis of cystic fibrosis (CF)?  No    On a regular basis do you go 3 -5 days between bowel movements?  No    BMI > 40?  No    Preferred Pharmacy:    [id number] - [place], [place] - [address]  [address]  [place] [address]  Phone: [phone] Fax: [phone]      Final Scheduling Details     Procedure scheduled  Colonoscopy    Surgeon:  Arlyn      Date of procedure:  06/11/2024     Pre-OP / PAC:   No - Not required for this site.    Location  [facility] - Per order.    Sedation   Moderate Sedation - Per order.      Patient Reminders:   You will receive a call from a Nurse to review instructions and health history.  This assessment must be completed prior to your procedure.  Failure to complete the Nurse assessment may result in the procedure being cancelled.      On the day of your procedure, please designate an adult(s) who can drive you home stay with you for the next 24 hours. The medicines used in the exam will make you sleepy. You will not be able to drive.      You cannot take public transportation, ride share services, or non-medical taxi service without a responsible caregiver.  Medical transport services are allowed with the requirement that a responsible caregiver will receive you at your destination.  We require that drivers and caregivers are confirmed prior to your procedure.

## 2024-04-02 ENCOUNTER — OFFICE VISIT (OUTPATIENT)
Dept: FAMILY MEDICINE | Facility: CLINIC | Age: 79
End: 2024-04-02
Attending: PHYSICIAN ASSISTANT
Payer: MEDICARE

## 2024-04-02 VITALS
BODY MASS INDEX: 26.49 KG/M2 | WEIGHT: 159 LBS | HEART RATE: 76 BPM | OXYGEN SATURATION: 97 % | RESPIRATION RATE: 14 BRPM | TEMPERATURE: 97.4 F | HEIGHT: 65 IN | DIASTOLIC BLOOD PRESSURE: 71 MMHG | SYSTOLIC BLOOD PRESSURE: 115 MMHG

## 2024-04-02 DIAGNOSIS — H69.91 DYSFUNCTION OF RIGHT EUSTACHIAN TUBE: Primary | ICD-10-CM

## 2024-04-02 DIAGNOSIS — B35.3 TINEA PEDIS OF BOTH FEET: ICD-10-CM

## 2024-04-02 DIAGNOSIS — H73.891 RETRACTION OF TYMPANIC MEMBRANE OF RIGHT EAR: ICD-10-CM

## 2024-04-02 PROCEDURE — 99213 OFFICE O/P EST LOW 20 MIN: CPT | Performed by: PHYSICIAN ASSISTANT

## 2024-04-02 RX ORDER — RESPIRATORY SYNCYTIAL VIRUS VACCINE 120MCG/0.5
0.5 KIT INTRAMUSCULAR ONCE
Qty: 1 EACH | Refills: 0 | Status: CANCELLED | OUTPATIENT
Start: 2024-04-02 | End: 2024-04-02

## 2024-04-02 RX ORDER — TERBINAFINE HYDROCHLORIDE 250 MG/1
250 TABLET ORAL DAILY
Qty: 14 TABLET | Refills: 0 | Status: SHIPPED | OUTPATIENT
Start: 2024-04-02 | End: 2024-04-16

## 2024-04-05 ENCOUNTER — PATIENT OUTREACH (OUTPATIENT)
Dept: CARE COORDINATION | Facility: CLINIC | Age: 79
End: 2024-04-05
Payer: MEDICARE

## 2024-04-06 DIAGNOSIS — I10 BENIGN ESSENTIAL HYPERTENSION: ICD-10-CM

## 2024-04-08 RX ORDER — LOSARTAN POTASSIUM 25 MG/1
25 TABLET ORAL DAILY
Qty: 90 TABLET | Refills: 3 | Status: SHIPPED | OUTPATIENT
Start: 2024-04-08 | End: 2024-06-05

## 2024-04-25 ENCOUNTER — ANCILLARY PROCEDURE (OUTPATIENT)
Dept: MAMMOGRAPHY | Facility: CLINIC | Age: 79
End: 2024-04-25
Attending: PHYSICIAN ASSISTANT
Payer: MEDICARE

## 2024-04-25 DIAGNOSIS — Z12.31 VISIT FOR SCREENING MAMMOGRAM: ICD-10-CM

## 2024-04-25 PROCEDURE — 77067 SCR MAMMO BI INCL CAD: CPT | Mod: TC | Performed by: RADIOLOGY

## 2024-04-25 PROCEDURE — 77063 BREAST TOMOSYNTHESIS BI: CPT | Mod: TC | Performed by: RADIOLOGY

## 2024-05-01 ENCOUNTER — OFFICE VISIT (OUTPATIENT)
Dept: PODIATRY | Facility: CLINIC | Age: 79
End: 2024-05-01
Attending: PHYSICIAN ASSISTANT
Payer: MEDICARE

## 2024-05-01 DIAGNOSIS — M79.672 PAIN IN BOTH FEET: ICD-10-CM

## 2024-05-01 DIAGNOSIS — B35.3 TINEA PEDIS OF BOTH FEET: ICD-10-CM

## 2024-05-01 DIAGNOSIS — M79.671 PAIN IN BOTH FEET: ICD-10-CM

## 2024-05-01 PROCEDURE — 99204 OFFICE O/P NEW MOD 45 MIN: CPT | Performed by: PODIATRIST

## 2024-05-01 RX ORDER — CLOTRIMAZOLE AND BETAMETHASONE DIPROPIONATE 10; .64 MG/G; MG/G
CREAM TOPICAL 2 TIMES DAILY
Qty: 45 G | Refills: 2 | Status: SHIPPED | OUTPATIENT
Start: 2024-05-01 | End: 2024-06-05

## 2024-05-01 ASSESSMENT — PAIN SCALES - GENERAL: PAINLEVEL: EXTREME PAIN (8)

## 2024-05-01 NOTE — PROGRESS NOTES
Past Medical History:   Diagnosis Date    Advanced directives, counseling/discussion 06/29/2011    Cervicalgia 08/08/2011    Dysphagia     Hyperlipidemia LDL goal <130 04/15/2011    Hyperlipidemia LDL goal <160 04/15/2011    High HDL    Hypertension     Osteoarthritides     Osteopenia 09/20/2011    Routine general medical examination at a health care facility 09/20/2011    Routine gynecological examination 09/20/2011    Umbilical hernia      Patient Active Problem List   Diagnosis    Hyperlipidemia LDL goal <160    Advance Care Planning    Cervicalgia    Osteopenia    Gastroesophageal reflux disease, esophagitis presence not specified    Onychomycosis    Radial scar of left breast    Elevated blood pressure reading without diagnosis of hypertension    Benign essential hypertension    Tinea pedis of both feet     Past Surgical History:   Procedure Laterality Date    BIOPSY      benign breast, right breast.2003    BIOPSY BREAST SEED LOCALIZATION Left 1/7/2021    Procedure: LEFT BREAST SEED LOCALIZED EXCISIONAL BIOPSY;  Surgeon: Jam Wright MD;  Location: [facility] OR    BREAST SURGERY  1985    benign lump removed. Left breast.1985    COLONOSCOPY  10/1/2013    Procedure: COLONOSCOPY;  Colonoscopy ;  Surgeon: Adair Stoddard MD;  Location: [facility] GI    EXCISE MASS UPPER EXTREMITY  5/20/2013    Procedure: EXCISE MASS UPPER EXTREMITY;  Remove Mass Right Upper Arm;  Surgeon: Elvira Wilson MD;  Location: [facility] OR    EYE SURGERY      lasik    wisdom teeth       Social History     Socioeconomic History    Marital status:      Spouse name: Not on file    Number of children: Not on file    Years of education: Not on file    Highest education level: Master's degree (e.g., MA, MS, April, MEd, MSW, MÓNICA)   Occupational History    Not on file   Tobacco Use    Smoking status: Never    Smokeless tobacco: Never   Vaping Use    Vaping status: Never Used   Substance and Sexual Activity    Alcohol use: No    Drug use: No     Sexual activity: Not Currently     Partners: Male     Birth control/protection: None   Other Topics Concern    Parent/sibling w/ CABG, MI or angioplasty before 65F 55M? No   Social History Narrative    Not on file     Social Determinants of Health     Financial Resource Strain: Low Risk  (5/23/2023)    Overall Financial Resource Strain (CARDIA)     Difficulty of Paying Living Expenses: Not hard at all   Food Insecurity: No Food Insecurity (5/23/2023)    Hunger Vital Sign     Worried About Running Out of Food in the Last Year: Never true     Ran Out of Food in the Last Year: Never true   Transportation Needs: No Transportation Needs (5/23/2023)    PRAPARE - Transportation     Lack of Transportation (Medical): No     Lack of Transportation (Non-Medical): No   Physical Activity: Insufficiently Active (5/23/2023)    Exercise Vital Sign     Days of Exercise per Week: 3 days     Minutes of Exercise per Session: 20 min   Stress: No Stress Concern Present (5/23/2023)    [employer] - Occupational Stress Questionnaire     Feeling of Stress : Not at all   Social Connections: Socially Integrated (5/23/2023)    Social Connection and Isolation Panel [NHANES]     Frequency of Communication with Friends and Family: Three times a week     Frequency of Social Gatherings with Friends and Family: Twice a week     Attends Temple Services: More than 4 times per year     Active Member of Clubs or Organizations: Yes     Attends Club or Organization Meetings: Not on file     Marital Status:    Interpersonal Safety: Low Risk  (3/5/2024)    Interpersonal Safety     Do you feel physically and emotionally safe where you currently live?: Yes     Within the past 12 months, have you been hit, slapped, kicked or otherwise physically hurt by someone?: No     Within the past 12 months, have you been humiliated or emotionally abused in other ways by your partner or ex-partner?: No   Housing Stability: Low Risk   (2023)    Housing Stability Vital Sign     Unable to Pay for Housing in the Last Year: No     Number of Places Lived in the Last Year: 1     Unstable Housing in the Last Year: No     Family History   Problem Relation Age of Onset    Cerebrovascular Disease Father             C.A.D. Mother     Coronary Artery Disease Mother          at 103 years old, CHF    Unknown/Adopted Maternal Grandmother         I don't know what disease this refers to.  She  in childbirth.    Hypertension Maternal Grandfather     Hypertension Paternal Grandfather     Blood Disease Sister     Neurologic Disorder Sister         epilepsy    Arthritis Other         neck and thumb joints       Last Comprehensive Metabolic Panel:  Lab Results   Component Value Date     2023    POTASSIUM 4.4 2023    CHLORIDE 102 2023    CO2 26 2023    ANIONGAP 12 2023    GLC 87 2023    BUN 25.2 (H) 2023    CR 0.87 2023    GFRESTIMATED 68 2023    WONG 10.4 (H) 2023             Subjective findings- 78-year-old presents from family practice for tinea with foot pain, I reviewed family practice 2024 note.  Patient relates she has had burning in her feet when she goes to bed, has had tinea pedis for the last 1 to 2 years that she has been treating with several different topical sprays, took Lamisil pills for 2 weeks not sure if that helped or not, relates she stopped using the sprays and the burning got better, no injuries, is not putting anything on the feet currently.    Objective findings- DP and PT are 2 out of 4 bilaterally.  Has mild peripheral edema bilaterally.  Has mild scaly dry cracked skin with erythematous congestion discoloration bilaterally in moccasin pattern.  There is no erythema, no drainage, no odor, no calor, no pain on palpation, no pain or range of motion bilaterally.  No tendon voids bilaterally.    Assessment and plan- Chronic Tinea Pedis causing burning pain  bilaterally.  Less likely Raynaud's or vascular disease changes.  Diagnosis and treatment options discussed with the patient.  Patient is advised on vinegar water soaks which she relates she has used in the past.  Prescription for Lotrisone cream given use discussed with the patient.  Return to clinic and see me in 3 to 4 weeks.                                Moderate level of medical decision making.

## 2024-05-01 NOTE — NURSING NOTE
Alix Escobar's chief complaint for this visit includes:  Chief Complaint   Patient presents with    Left Foot - Pain, New Patient    Right Foot - Pain, New Patient     PCP: Danielle Borrero    Referring Provider:  Carol Yang PA-C  [id number] [phone]    There were no vitals taken for this visit.  Extreme Pain (8)     Do you need any medication refills at today's visit? NO    Allergies   Allergen Reactions    Seasonal Allergies        Beltran Nicole, EMT

## 2024-05-01 NOTE — LETTER
5/1/2024         RE: Alix Escobar  [address] [facility] [id number]        Dear Colleague,    Thank you for referring your patient, Alix Escobar, to the [facility]. Please see a copy of my visit note below.    Past Medical History:   Diagnosis Date     Advanced directives, counseling/discussion 06/29/2011     Cervicalgia 08/08/2011     Dysphagia      Hyperlipidemia LDL goal <130 04/15/2011     Hyperlipidemia LDL goal <160 04/15/2011    High HDL     Hypertension      Osteoarthritides      Osteopenia 09/20/2011     Routine general medical examination at a [facility] care facility 09/20/2011     Routine gynecological examination 09/20/2011     Umbilical hernia      Patient Active Problem List   Diagnosis     Hyperlipidemia LDL goal <160     Advance Care Planning     Cervicalgia     Osteopenia     Gastroesophageal reflux disease, esophagitis presence not specified     Onychomycosis     Radial scar of left breast     Elevated blood pressure reading without diagnosis of hypertension     Benign essential hypertension     Tinea pedis of both feet     Past Surgical History:   Procedure Laterality Date     BIOPSY      benign breast, right breast.2003     BIOPSY BREAST SEED LOCALIZATION Left 1/7/2021    Procedure: LEFT BREAST SEED LOCALIZED EXCISIONAL BIOPSY;  Surgeon: Jam Wright MD;  Location: [facility] OR     BREAST SURGERY  1985    benign lump removed. Left breast.1985     COLONOSCOPY  10/1/2013    Procedure: COLONOSCOPY;  Colonoscopy ;  Surgeon: Adair Stoddard MD;  Location:  GI     EXCISE MASS UPPER EXTREMITY  5/20/2013    Procedure: EXCISE MASS UPPER EXTREMITY;  Remove Mass Right Upper Arm;  Surgeon: Elvira Wilson MD;  Location: [facility] OR     EYE SURGERY      lasik     wisdom teeth       Social History     Socioeconomic History     Marital status:      Spouse name: Not on file     Number of children: Not on file     Years of education: Not on file     Highest  education level: Master's degree (e.g., MA, MS, April, MEd, MSW, MÓNICA)   Occupational History     Not on file   Tobacco Use     Smoking status: Never     Smokeless tobacco: Never   Vaping Use     Vaping status: Never Used   Substance and Sexual Activity     Alcohol use: No     Drug use: No     Sexual activity: Not Currently     Partners: Male     Birth control/protection: None   Other Topics Concern     Parent/sibling w/ CABG, MI or angioplasty before 65F 55M? No   Social History Narrative     Not on file     Social Determinants of Health     Financial Resource Strain: Low Risk  (5/23/2023)    Overall Financial Resource Strain (CARDIA)      Difficulty of Paying Living Expenses: Not hard at all   Food Insecurity: No Food Insecurity (5/23/2023)    Hunger Vital Sign      Worried About Running Out of Food in the Last Year: Never true      Ran Out of Food in the Last Year: Never true   Transportation Needs: No Transportation Needs (5/23/2023)    PRAPARE - Transportation      Lack of Transportation (Medical): No      Lack of Transportation (Non-Medical): No   Physical Activity: Insufficiently Active (5/23/2023)    Exercise Vital Sign      Days of Exercise per Week: 3 days      Minutes of Exercise per Session: 20 min   Stress: No Stress Concern Present (5/23/2023)    [employer] [place] [employer] - Occupational Stress Questionnaire      Feeling of Stress : Not at all   Social Connections: Socially Integrated (5/23/2023)    Social Connection and Isolation Panel [NHANES]      Frequency of Communication with Friends and Family: Three times a week      Frequency of Social Gatherings with Friends and Family: Twice a week      Attends Scientology Services: More than 4 times per year      Active Member of Clubs or Organizations: Yes      Attends Club or Organization Meetings: Not on file      Marital Status:    Interpersonal Safety: Low Risk  (3/5/2024)    Interpersonal Safety      Do you feel physically and  emotionally safe where you currently live?: Yes      Within the past 12 months, have you been hit, slapped, kicked or otherwise physically hurt by someone?: No      Within the past 12 months, have you been humiliated or emotionally abused in other ways by your partner or ex-partner?: No   Housing Stability: Low Risk  (2023)    Housing Stability Vital Sign      Unable to Pay for Housing in the Last Year: No      Number of Places Lived in the Last Year: 1      Unstable Housing in the Last Year: No     Family History   Problem Relation Age of Onset     Cerebrovascular Disease Father              C.A.D. Mother      Coronary Artery Disease Mother          at 103 years old, CHF     Unknown/Adopted Maternal Grandmother         I don't know what disease this refers to.  She  in childbirth.     Hypertension Maternal Grandfather      Hypertension Paternal Grandfather      Blood Disease Sister      Neurologic Disorder Sister         epilepsy     Arthritis Other         neck and thumb joints       Last Comprehensive Metabolic Panel:  Lab Results   Component Value Date     2023    POTASSIUM 4.4 2023    CHLORIDE 102 2023    CO2 26 2023    ANIONGAP 12 2023    GLC 87 2023    BUN 25.2 (H) 2023    CR 0.87 2023    GFRESTIMATED 68 2023    WONG 10.4 (H) 2023             Subjective findings- 78-year-old presents from family practice for tinea with foot pain, I reviewed family practice 2024 note.  Patient relates she has had burning in her feet when she goes to bed, has had tinea pedis for the last 1 to 2 years that she has been treating with several different topical sprays, took Lamisil pills for 2 weeks not sure if that helped or not, relates she stopped using the sprays and the burning got better, no injuries, is not putting anything on the feet currently.    Objective findings- DP and PT are 2 out of 4 bilaterally.  Has mild peripheral edema  bilaterally.  Has mild scaly dry cracked skin with erythematous congestion discoloration bilaterally in moccasin pattern.  There is no erythema, no drainage, no odor, no calor, no pain on palpation, no pain or range of motion bilaterally.  No tendon voids bilaterally.    Assessment and plan- Chronic Tinea Pedis causing burning pain bilaterally.  Less likely Raynaud's or vascular disease changes.  Diagnosis and treatment options discussed with the patient.  Patient is advised on vinegar water soaks which she relates she has used in the past.  Prescription for Lotrisone cream given use discussed with the patient.  Return to clinic and see me in 3 to 4 weeks.                                Moderate level of medical decision making.      Again, thank you for allowing me to participate in the care of your patient.        Sincerely,        Víctor Sarabia DPM

## 2024-05-01 NOTE — NURSING NOTE
Chief Complaint   Patient presents with    Left Foot - Pain, New Patient    Right Foot - Pain, New Patient       There were no vitals filed for this visit.    There is no height or weight on file to calculate BMI.      BEN Gong NREMT

## 2024-05-22 ENCOUNTER — OFFICE VISIT (OUTPATIENT)
Dept: PODIATRY | Facility: CLINIC | Age: 79
End: 2024-05-22
Payer: MEDICARE

## 2024-05-22 ENCOUNTER — TELEPHONE (OUTPATIENT)
Dept: PODIATRY | Facility: CLINIC | Age: 79
End: 2024-05-22

## 2024-05-22 DIAGNOSIS — M79.672 PAIN IN BOTH FEET: ICD-10-CM

## 2024-05-22 DIAGNOSIS — M79.671 PAIN IN BOTH FEET: ICD-10-CM

## 2024-05-22 DIAGNOSIS — B35.3 TINEA PEDIS OF BOTH FEET: Primary | ICD-10-CM

## 2024-05-22 PROCEDURE — 99213 OFFICE O/P EST LOW 20 MIN: CPT | Performed by: PODIATRIST

## 2024-05-22 RX ORDER — CAPSAICIN 0.025 %
CREAM (GRAM) TOPICAL
Qty: 45 G | Refills: 3 | Status: SHIPPED | OUTPATIENT
Start: 2024-05-22

## 2024-05-22 ASSESSMENT — PAIN SCALES - GENERAL: PAINLEVEL: MODERATE PAIN (5)

## 2024-05-22 NOTE — PROGRESS NOTES
Past Medical History:   Diagnosis Date    Advanced directives, counseling/discussion 06/29/2011    Cervicalgia 08/08/2011    Dysphagia     Hyperlipidemia LDL goal <130 04/15/2011    Hyperlipidemia LDL goal <160 04/15/2011    High HDL    Hypertension     Osteoarthritides     Osteopenia 09/20/2011    Routine general medical examination at a health care facility 09/20/2011    Routine gynecological examination 09/20/2011    Umbilical hernia      Patient Active Problem List   Diagnosis    Hyperlipidemia LDL goal <160    Advance Care Planning    Cervicalgia    Osteopenia    Gastroesophageal reflux disease, esophagitis presence not specified    Onychomycosis    Radial scar of left breast    Elevated blood pressure reading without diagnosis of hypertension    Benign essential hypertension    Tinea pedis of both feet     Past Surgical History:   Procedure Laterality Date    BIOPSY      benign breast, right breast.2003    BIOPSY BREAST SEED LOCALIZATION Left 1/7/2021    Procedure: LEFT BREAST SEED LOCALIZED EXCISIONAL BIOPSY;  Surgeon: Jam Wright MD;  Location: [facility] OR    BREAST SURGERY  1985    benign lump removed. Left breast.1985    COLONOSCOPY  10/1/2013    Procedure: COLONOSCOPY;  Colonoscopy ;  Surgeon: Adair Stoddard MD;  Location: [facility] GI    EXCISE MASS UPPER EXTREMITY  5/20/2013    Procedure: EXCISE MASS UPPER EXTREMITY;  Remove Mass Right Upper Arm;  Surgeon: Elvira Wilson MD;  Location: [facility] OR    EYE SURGERY      lasik    wisdom teeth       Social History     Socioeconomic History    Marital status:      Spouse name: Not on file    Number of children: Not on file    Years of education: Not on file    Highest education level: Master's degree (e.g., MA, MS, April, MEd, MSW, MÓNICA)   Occupational History    Not on file   Tobacco Use    Smoking status: Never    Smokeless tobacco: Never   Vaping Use    Vaping status: Never Used   Substance and Sexual Activity    Alcohol use: No    Drug use: No     Sexual activity: Not Currently     Partners: Male     Birth control/protection: None   Other Topics Concern    Parent/sibling w/ CABG, MI or angioplasty before 65F 55M? No   Social History Narrative    Not on file     Social Determinants of Health     Financial Resource Strain: Low Risk  (5/23/2023)    Overall Financial Resource Strain (CARDIA)     Difficulty of Paying Living Expenses: Not hard at all   Food Insecurity: No Food Insecurity (5/23/2023)    Hunger Vital Sign     Worried About Running Out of Food in the Last Year: Never true     Ran Out of Food in the Last Year: Never true   Transportation Needs: No Transportation Needs (5/23/2023)    PRAPARE - Transportation     Lack of Transportation (Medical): No     Lack of Transportation (Non-Medical): No   Physical Activity: Insufficiently Active (5/23/2023)    Exercise Vital Sign     Days of Exercise per Week: 3 days     Minutes of Exercise per Session: 20 min   Stress: No Stress Concern Present (5/23/2023)    [place] [employer]ational [employer] - Occupational Stress Questionnaire     Feeling of Stress : Not at all   Social Connections: Socially Integrated (5/23/2023)    Social Connection and Isolation Panel [NHANES]     Frequency of Communication with Friends and Family: Three times a week     Frequency of Social Gatherings with Friends and Family: Twice a week     Attends Jehovah's witness Services: More than 4 times per year     Active Member of Clubs or Organizations: Yes     Attends Club or Organization Meetings: Not on file     Marital Status:    Interpersonal Safety: Low Risk  (3/5/2024)    Interpersonal Safety     Do you feel physically and emotionally safe where you currently live?: Yes     Within the past 12 months, have you been hit, slapped, kicked or otherwise physically hurt by someone?: No     Within the past 12 months, have you been humiliated or emotionally abused in other ways by your partner or ex-partner?: No   Housing Stability: Low Risk   (2023)    Housing Stability Vital Sign     Unable to Pay for Housing in the Last Year: No     Number of Places Lived in the Last Year: 1     Unstable Housing in the Last Year: No     Family History   Problem Relation Age of Onset    Cerebrovascular Disease Father             C.A.D. Mother     Coronary Artery Disease Mother          at 103 years old, CHF    Unknown/Adopted Maternal Grandmother         I don't know what disease this refers to.  She  in childbirth.    Hypertension Maternal Grandfather     Hypertension Paternal Grandfather     Blood Disease Sister     Neurologic Disorder Sister         epilepsy    Arthritis Other         neck and thumb joints           Subjective findings- 75-year-old returns clinic for Tinea Pedis with burning pain in her feet bilaterally.  She relates using the Lotrisone twice daily with no problems but does not feel its helped, relates every night she gets burning in her feet so she has to take the covers off her feet.    Objective findings- DP and PT are 2 out of 4 bilaterally.  Her skin is dry and intact with no dryness or cracking.  There is no erythema, no drainage, no odor, no calor, no edema, no pain on palpation bilaterally.    Assessment and plan- Chronic Tinea Pedis appears resolved.  Burning pain in her feet bilaterally.  Diagnosis and treatment options discussed with the patient.  Will discontinue the Lotrisone cream.  Referral to neurology given use discussed with the patient.  Prescription for Capsaicin cream given and use discussed with the patient.  Previous notes reviewed.  Return to clinic and see me as needed pending her Neurology appointment.                          Low level of medical decision making.

## 2024-05-22 NOTE — TELEPHONE ENCOUNTER
Called and talked to the patient. Mentioned that Dr. Sarabia re-ordered the referral and put a Doctors name that he would like for her to see and that she can now schedule the appointment for neurology. The patient verbally understood.

## 2024-05-22 NOTE — NURSING NOTE
Alix Escobar's chief complaint for this visit includes:  Chief Complaint   Patient presents with    Left Foot - Pain, Follow Up    Right Foot - Pain, Follow Up     PCP: Danielle Borrero    Referring Provider:  No referring provider defined for this encounter.    There were no vitals taken for this visit.  Moderate Pain (5)     Do you need any medication refills at today's visit? NO    Allergies   Allergen Reactions    Seasonal Allergies        Beltran Nicole, EMT

## 2024-05-22 NOTE — TELEPHONE ENCOUNTER
Left Voicemail (1st Attempt) for the patient to call back and schedule the following:    Appointment type: neurology referral  Specialty phone number: [phone]    [facility]   Orthopedics, Podiatry, Sports Medicine, Ent ,Eye , Audiology, Adult Endocrine & Diabetes, Nutrition & Medication Therapy Management Specialties   [facility]

## 2024-05-22 NOTE — TELEPHONE ENCOUNTER
Called and talked to the patient. The patient mentioned that the referral needed a doctors name on the referral that Dr. Sarabia would like her to see. The patient mentioned that she was told that there this name was needed for her to get scheduled. Told the patient I will pass along her message to Dr. Sarabia and she verbally understood.

## 2024-05-22 NOTE — LETTER
5/22/2024         RE: Alix Escobar  [address] [facility] [id number]        Dear Colleague,    Thank you for referring your patient, Alix Escobra, to the [facility]. Please see a copy of my visit note below.    Past Medical History:   Diagnosis Date     Advanced directives, counseling/discussion 06/29/2011     Cervicalgia 08/08/2011     Dysphagia      Hyperlipidemia LDL goal <130 04/15/2011     Hyperlipidemia LDL goal <160 04/15/2011    High HDL     Hypertension      Osteoarthritides      Osteopenia 09/20/2011     Routine general medical examination at a [facility] care facility 09/20/2011     Routine gynecological examination 09/20/2011     Umbilical hernia      Patient Active Problem List   Diagnosis     Hyperlipidemia LDL goal <160     Advance Care Planning     Cervicalgia     Osteopenia     Gastroesophageal reflux disease, esophagitis presence not specified     Onychomycosis     Radial scar of left breast     Elevated blood pressure reading without diagnosis of hypertension     Benign essential hypertension     Tinea pedis of both feet     Past Surgical History:   Procedure Laterality Date     BIOPSY      benign breast, right breast.2003     BIOPSY BREAST SEED LOCALIZATION Left 1/7/2021    Procedure: LEFT BREAST SEED LOCALIZED EXCISIONAL BIOPSY;  Surgeon: Jam Wright MD;  Location: [facility] OR     BREAST SURGERY  1985    benign lump removed. Left breast.1985     COLONOSCOPY  10/1/2013    Procedure: COLONOSCOPY;  Colonoscopy ;  Surgeon: Adair Stoddard MD;  Location:  GI     EXCISE MASS UPPER EXTREMITY  5/20/2013    Procedure: EXCISE MASS UPPER EXTREMITY;  Remove Mass Right Upper Arm;  Surgeon: Elvira Wilson MD;  Location: [facility] OR     EYE SURGERY      lasik     wisdom teeth       Social History     Socioeconomic History     Marital status:      Spouse name: Not on file     Number of children: Not on file     Years of education: Not on file     Highest  education level: Master's degree (e.g., MA, MS, April, MEd, MSW, MÓNICA)   Occupational History     Not on file   Tobacco Use     Smoking status: Never     Smokeless tobacco: Never   Vaping Use     Vaping status: Never Used   Substance and Sexual Activity     Alcohol use: No     Drug use: No     Sexual activity: Not Currently     Partners: Male     Birth control/protection: None   Other Topics Concern     Parent/sibling w/ CABG, MI or angioplasty before 65F 55M? No   Social History Narrative     Not on file     Social Determinants of Health     Financial Resource Strain: Low Risk  (5/23/2023)    Overall Financial Resource Strain (CARDIA)      Difficulty of Paying Living Expenses: Not hard at all   Food Insecurity: No Food Insecurity (5/23/2023)    Hunger Vital Sign      Worried About Running Out of Food in the Last Year: Never true      Ran Out of Food in the Last Year: Never true   Transportation Needs: No Transportation Needs (5/23/2023)    PRAPARE - Transportation      Lack of Transportation (Medical): No      Lack of Transportation (Non-Medical): No   Physical Activity: Insufficiently Active (5/23/2023)    Exercise Vital Sign      Days of Exercise per Week: 3 days      Minutes of Exercise per Session: 20 min   Stress: No Stress Concern Present (5/23/2023)    [place] of [employer]ational Health - Occupational Stress Questionnaire      Feeling of Stress : Not at all   Social Connections: Socially Integrated (5/23/2023)    Social Connection and Isolation Panel [NHANES]      Frequency of Communication with Friends and Family: Three times a week      Frequency of Social Gatherings with Friends and Family: Twice a week      Attends Anabaptism Services: More than 4 times per year      Active Member of Clubs or Organizations: Yes      Attends Club or Organization Meetings: Not on file      Marital Status:    Interpersonal Safety: Low Risk  (3/5/2024)    Interpersonal Safety      Do you feel physically and  emotionally safe where you currently live?: Yes      Within the past 12 months, have you been hit, slapped, kicked or otherwise physically hurt by someone?: No      Within the past 12 months, have you been humiliated or emotionally abused in other ways by your partner or ex-partner?: No   Housing Stability: Low Risk  (2023)    Housing Stability Vital Sign      Unable to Pay for Housing in the Last Year: No      Number of Places Lived in the Last Year: 1      Unstable Housing in the Last Year: No     Family History   Problem Relation Age of Onset     Cerebrovascular Disease Father              C.A.D. Mother      Coronary Artery Disease Mother          at 103 years old, CHF     Unknown/Adopted Maternal Grandmother         I don't know what disease this refers to.  She  in childbirth.     Hypertension Maternal Grandfather      Hypertension Paternal Grandfather      Blood Disease Sister      Neurologic Disorder Sister         epilepsy     Arthritis Other         neck and thumb joints           Subjective findings- 75-year-old returns clinic for Tinea Pedis with burning pain in her feet bilaterally.  She relates using the Lotrisone twice daily with no problems but does not feel its helped, relates every night she gets burning in her feet so she has to take the covers off her feet.    Objective findings- DP and PT are 2 out of 4 bilaterally.  Her skin is dry and intact with no dryness or cracking.  There is no erythema, no drainage, no odor, no calor, no edema, no pain on palpation bilaterally.    Assessment and plan- Chronic Tinea Pedis appears resolved.  Burning pain in her feet bilaterally.  Diagnosis and treatment options discussed with the patient.  Will discontinue the Lotrisone cream.  Referral to neurology given use discussed with the patient.  Prescription for Capsaicin cream given and use discussed with the patient.  Previous notes reviewed.  Return to clinic and see me as needed pending her  Neurology appointment.                          Low level of medical decision making.      Again, thank you for allowing me to participate in the care of your patient.        Sincerely,        Víctor Sarabia DPM

## 2024-05-22 NOTE — TELEPHONE ENCOUNTER
[facility] Call Center    Phone Message    May a detailed message be left on voicemail: yes     Reason for Call: Referred to Neurology . Order didn't have enough info on Referral. Patient asking for Call back from Doctor. Please call Patient    Action Taken:     [facility]       Travel Screening: Not Applicable

## 2024-05-30 ENCOUNTER — TELEPHONE (OUTPATIENT)
Dept: GASTROENTEROLOGY | Facility: CLINIC | Age: 79
End: 2024-05-30
Payer: MEDICARE

## 2024-05-30 NOTE — TELEPHONE ENCOUNTER
Pre visit planning completed.      Procedure details:    Patient scheduled for Colonoscopy  on 06.11.2024.     Arrival time: 1130. Procedure time 1215    Facility location[facility]; [address] [id number] Check in location: Main entrance at . Come through the roundabout underneath the awning (not the ER entrance).    Sedation type: Conscious sedation     Pre op exam needed? N/A    Indication for procedure: Screen for colon cancer       Chart review:     Electronic implanted devices? No    Recent diagnosis of diverticulitis within the last 6 weeks? No    Diabetic? No      Medication review:    Anticoagulants? No    NSAIDS? Yes.  Naproxen (Naprosyn, Aleve).  Holding interval of 4 days.    Other medication HOLDING recommendations:  N/A      Prep for procedure:     Bowel prep recommendation: Standard Miralax  Due to: standard bowel prep.    Prep instructions sent via C[employer] Wanted         Kirsty Barr RN  Endoscopy Procedure Pre Assessment RN  [phone] option 4

## 2024-05-31 SDOH — HEALTH STABILITY: PHYSICAL HEALTH: ON AVERAGE, HOW MANY DAYS PER WEEK DO YOU ENGAGE IN MODERATE TO STRENUOUS EXERCISE (LIKE A BRISK WALK)?: 4 DAYS

## 2024-05-31 SDOH — HEALTH STABILITY: PHYSICAL HEALTH: ON AVERAGE, HOW MANY MINUTES DO YOU ENGAGE IN EXERCISE AT THIS LEVEL?: 30 MIN

## 2024-05-31 ASSESSMENT — SOCIAL DETERMINANTS OF HEALTH (SDOH)
DO YOU BELONG TO ANY CLUBS OR ORGANIZATIONS SUCH AS CHURCH GROUPS UNIONS, FRATERNAL OR ATHLETIC GROUPS, OR SCHOOL GROUPS?: YES
IN A TYPICAL WEEK, HOW MANY TIMES DO YOU TALK ON THE PHONE WITH FAMILY, FRIENDS, OR NEIGHBORS?: TWICE A WEEK
HOW OFTEN DO YOU GET TOGETHER WITH FRIENDS OR RELATIVES?: THREE TIMES A WEEK
HOW OFTEN DO YOU ATTEND CHURCH OR RELIGIOUS SERVICES?: MORE THAN 4 TIMES PER YEAR
HOW OFTEN DO YOU ATTENT MEETINGS OF THE CLUB OR ORGANIZATION YOU BELONG TO?: MORE THAN 4 TIMES PER YEAR
HOW OFTEN DO YOU GET TOGETHER WITH FRIENDS OR RELATIVES?: THREE TIMES A WEEK

## 2024-05-31 ASSESSMENT — LIFESTYLE VARIABLES
HOW OFTEN DO YOU HAVE A DRINK CONTAINING ALCOHOL: NEVER
AUDIT-C TOTAL SCORE: 0
HOW MANY STANDARD DRINKS CONTAINING ALCOHOL DO YOU HAVE ON A TYPICAL DAY: PATIENT DOES NOT DRINK
HOW OFTEN DO YOU HAVE SIX OR MORE DRINKS ON ONE OCCASION: NEVER
SKIP TO QUESTIONS 9-10: 1

## 2024-06-03 NOTE — TELEPHONE ENCOUNTER
Pre assessment completed for upcoming procedure.   (Please see previous telephone encounter notes for complete details)    Patient  returned call.       Procedure details:    Arrival time and facility location reviewed.    Pre op exam needed? N/A    Designated  policy reviewed. Instructed to have someone stay 6  hours post procedure.       Medication review:    Medications reviewed. Please see supporting documentation below. Holding recommendations discussed (if applicable).       Prep for procedure:     Procedure prep instructions reviewed.        Any additional information needed:  N/A      Patient  verbalized understanding and had no questions or concerns at this time.      Sania Sorenson RN  Endoscopy Procedure Pre Assessment   [phone] option 4

## 2024-06-03 NOTE — TELEPHONE ENCOUNTER
Attempted to contact patient in order to complete pre assessment questions.     No answer. Left message to return call to [phone] option 4    Callback required communication sent via Vudu.      Kirsty Barr RN  Endoscopy Procedure Pre Assessment

## 2024-06-05 ENCOUNTER — OFFICE VISIT (OUTPATIENT)
Dept: FAMILY MEDICINE | Facility: CLINIC | Age: 79
End: 2024-06-05
Attending: PHYSICIAN ASSISTANT
Payer: MEDICARE

## 2024-06-05 VITALS
OXYGEN SATURATION: 97 % | SYSTOLIC BLOOD PRESSURE: 137 MMHG | DIASTOLIC BLOOD PRESSURE: 79 MMHG | HEIGHT: 66 IN | RESPIRATION RATE: 16 BRPM | WEIGHT: 162 LBS | TEMPERATURE: 97.9 F | BODY MASS INDEX: 26.03 KG/M2 | HEART RATE: 72 BPM

## 2024-06-05 DIAGNOSIS — Z00.00 ENCOUNTER FOR MEDICARE ANNUAL WELLNESS EXAM: Primary | ICD-10-CM

## 2024-06-05 DIAGNOSIS — R41.3 MEMORY CHANGES: ICD-10-CM

## 2024-06-05 DIAGNOSIS — R26.81 GAIT INSTABILITY: ICD-10-CM

## 2024-06-05 DIAGNOSIS — R53.83 OTHER FATIGUE: ICD-10-CM

## 2024-06-05 DIAGNOSIS — E78.5 HYPERLIPIDEMIA LDL GOAL <160: ICD-10-CM

## 2024-06-05 DIAGNOSIS — Z78.0 ENCOUNTER FOR OSTEOPOROSIS SCREENING IN ASYMPTOMATIC POSTMENOPAUSAL PATIENT: ICD-10-CM

## 2024-06-05 DIAGNOSIS — Z13.820 ENCOUNTER FOR OSTEOPOROSIS SCREENING IN ASYMPTOMATIC POSTMENOPAUSAL PATIENT: ICD-10-CM

## 2024-06-05 DIAGNOSIS — I10 BENIGN ESSENTIAL HYPERTENSION: ICD-10-CM

## 2024-06-05 LAB
ERYTHROCYTE [DISTWIDTH] IN BLOOD BY AUTOMATED COUNT: 13.3 % (ref 10–15)
HCT VFR BLD AUTO: 39.2 % (ref 35–47)
HGB BLD-MCNC: 12.9 G/DL (ref 11.7–15.7)
MCH RBC QN AUTO: 29.3 PG (ref 26.5–33)
MCHC RBC AUTO-ENTMCNC: 32.9 G/DL (ref 31.5–36.5)
MCV RBC AUTO: 89 FL (ref 78–100)
PLATELET # BLD AUTO: 297 10E3/UL (ref 150–450)
RBC # BLD AUTO: 4.4 10E6/UL (ref 3.8–5.2)
WBC # BLD AUTO: 5.6 10E3/UL (ref 4–11)

## 2024-06-05 PROCEDURE — 36415 COLL VENOUS BLD VENIPUNCTURE: CPT | Performed by: PHYSICIAN ASSISTANT

## 2024-06-05 PROCEDURE — 99214 OFFICE O/P EST MOD 30 MIN: CPT | Mod: 25 | Performed by: PHYSICIAN ASSISTANT

## 2024-06-05 PROCEDURE — 85027 COMPLETE CBC AUTOMATED: CPT | Performed by: PHYSICIAN ASSISTANT

## 2024-06-05 PROCEDURE — G0439 PPPS, SUBSEQ VISIT: HCPCS | Performed by: PHYSICIAN ASSISTANT

## 2024-06-05 PROCEDURE — 84443 ASSAY THYROID STIM HORMONE: CPT | Performed by: PHYSICIAN ASSISTANT

## 2024-06-05 PROCEDURE — 80061 LIPID PANEL: CPT | Performed by: PHYSICIAN ASSISTANT

## 2024-06-05 PROCEDURE — 80053 COMPREHEN METABOLIC PANEL: CPT | Performed by: PHYSICIAN ASSISTANT

## 2024-06-05 RX ORDER — LOSARTAN POTASSIUM 25 MG/1
25 TABLET ORAL DAILY
Qty: 90 TABLET | Refills: 3 | Status: SHIPPED | OUTPATIENT
Start: 2024-06-05

## 2024-06-05 NOTE — PROGRESS NOTES
"Preventive Care Visit  [facility]  Danielle Borrero PA-C, Family Medicine  Jun 5, 2024      Assessment & Plan     (Z00.00) Encounter for Medicare annual wellness exam  (primary encounter diagnosis)  Comment:   Plan: [place] ANNUAL WELLNESS VISIT, PPS, SUBSEQUENT            (E78.5) Hyperlipidemia LDL goal <160  Comment: declines statin.   Plan: Lipid panel reflex to direct LDL Non-fasting,         OFFICE/OUTPT VISIT,EST,LEVL IV            (I10) Benign essential hypertension  Comment: stable with meds.   Plan: losartan (COZAAR) 25 MG tablet, Comprehensive         metabolic panel (BMP + Alb, Alk Phos, ALT, AST,        Total. Bili, TP), CBC with platelets,         OFFICE/OUTPT VISIT,EST,LEVL IV            (R41.3) Memory changes  Comment: pt has upcoming appt with Neurology for feet, but recommend she discuss memory concerns.  Low suspicion, passed cognitive screening easily   Plan: OFFICE/OUTPT VISIT,EST,LEVL IV            (R26.81) Gait instability  Comment: will try P.T. for balance.   Plan: Physical Therapy  Referral,         OFFICE/OUTPT VISIT,EST,LEVL IV            (Z13.820,  Z78.0) Encounter for osteoporosis screening in asymptomatic postmenopausal patient  Comment: due  Plan: DX Bone Density            (R53.83) Other fatigue  Comment: await labs.   Plan: TSH with free T4 reflex, OFFICE/OUTPT         VISIT,EST,LEVL IV            Patient has been advised of split billing requirements and indicates understanding: No        BMI  Estimated body mass index is 26.55 kg/m  as calculated from the following:    Height as of this encounter: 1.664 m (5' 5.5\").    Weight as of this encounter: 73.5 kg (162 lb).   Weight management plan: Discussed healthy diet and exercise guidelines    Counseling  Appropriate preventive services were discussed with this patient, including applicable screening as appropriate for fall prevention, nutrition, physical activity, Tobacco-use cessation, weight loss and " cognition.  Checklist reviewing preventive services available has been given to the patient.  Reviewed patient's diet, addressing concerns and/or questions.   Discussed possible causes of fatigue. Information on urinary incontinence and treatment options given to patient.           Graham Thomson is a 78 year old, presenting for the following:  Wellness Visit        6/5/2024     9:38 AM   Additional Questions   Roomed by Radhika SANDRA         Health Care Directive  Patient has a Health Care Directive on file  Advance care planning document is on file and is current.    HPI      Hyperlipidemia Follow-Up    Are you regularly taking any medication or supplement to lower your cholesterol?   No, declines  Are you having muscle aches or other side effects that you think could be caused by your cholesterol lowering medication?  N/a    Hypertension Follow-up    Do you check your blood pressure regularly outside of the clinic? No   Are you following a low salt diet? Yes  Are your blood pressures ever more than 140 on the top number (systolic) OR more   than 90 on the bottom number (diastolic), for example 140/90? No      pt states her  would like her to get her memory checked.  She states she will often be telling a story and then loses her train of thought.  Patient has not noticed any difficulty completing other activities of daily living.  She still drives and has not noticed any memory issues anywhere else in her life.    Patient also has noticed some slight gait instability.  She stated when she was standing on a riser during choir she felt unsteady at the end of the riser and moved towards the middle she has noticed that she is holding on to railings more for stability.  She has not had any falls.  No episodes of near syncope or vertigo.    Feeling fatigued, but feels it's age related. Still doing daily, normal activities.       5/31/2024   General Health   How would you rate your overall physical health? Good    Feel stress (tense, anxious, or unable to sleep) Not at all    Not at all         5/31/2024   Nutrition   Diet: Low salt         5/31/2024   Exercise   Days per week of moderate/strenous exercise 4 days    4 days   Average minutes spent exercising at this level 30 min    30 min         5/31/2024   Social Factors   Frequency of gathering with friends or relatives Three times a week    Three times a week   Worry food won't last until get money to buy more No    No   Food not last or not have enough money for food? No    No   Do you have housing?  Yes    Yes   Are you worried about losing your housing? No    No   Lack of transportation? No    No   Unable to get utilities (heat,electricity)? No    No         5/31/2024   Fall Risk   Fallen 2 or more times in the past year? No    No   Trouble with walking or balance? Yes    Yes           5/31/2024   Activities of Daily Living- Home Safety   Needs help with the following daily activites None of the above   Safety concerns in the home None of the above         5/31/2024   Dental   Dentist two times every year? Yes         5/31/2024   Hearing Screening   Hearing concerns? None of the above         5/31/2024   Driving Risk Screening   Patient/family members have concerns about driving No         5/31/2024   General Alertness/Fatigue Screening   Have you been more tired than usual lately? (!) YES         5/31/2024   Urinary Incontinence Screening   Bothered by leaking urine in past 6 months Yes         5/31/2024   TB Screening   Were you born outside of the [place]? No         Today's PHQ-2 Score:       6/4/2024    11:47 AM   PHQ-2 ( 1999 [employer])   Q1: Little interest or pleasure in doing things 0   Q2: Feeling down, depressed or hopeless 0   PHQ-2 Score 0   Q1: Little interest or pleasure in doing things Not at all   Q2: Feeling down, depressed or hopeless Not at all   PHQ-2 Score 0           5/31/2024   Substance Use   Alcohol more than 3/day or more than 7/wk Not Applicable    Do you have a current opioid prescription? No   How severe/bad is pain from 1 to 10? 0/10 (No Pain)   Do you use any other substances recreationally? No     Social History     Tobacco Use    Smoking status: Never    Smokeless tobacco: Never   Vaping Use    Vaping status: Never Used   Substance Use Topics    Alcohol use: No    Drug use: No           2024   LAST FHS-7 RESULTS   1st degree relative breast or ovarian cancer No   Any relative bilateral breast cancer No   Any male have breast cancer No   Any ONE woman have BOTH breast AND ovarian cancer No   Any woman with breast cancer before 50yrs No   2 or more relatives with breast AND/OR ovarian cancer No   2 or more relatives with breast AND/OR bowel cancer No        Mammogram Screening - Mammogram every 1-2 years updated in Health Maintenance based on mutual decision making    ASCVD Risk   The 10-year ASCVD risk score (Zuleika WHITLOCK, et al., 2019) is: 24.6%    Values used to calculate the score:      Age: 78 years      Sex: Female      Is Non- : No      Diabetic: No      Tobacco smoker: No      Systolic Blood Pressure: 115 mmHg      Is BP treated: Yes      HDL Cholesterol: 83 mg/dL      Total Cholesterol: 237 mg/dL    Fracture Risk Assessment Tool  Link to Frax Calculator  Use the information below to complete the Frax calculator  : 1945  Sex: female  Weight (kg): 73.5 kg (actual weight)  Height (cm): 166.4 cm  Previous Fragility Fracture:  No  History of parent with fractured hip:  No  Current Smoking:  No  Patient has been on glucocorticoids for more than 3 months (5mg/day or more): No  Rheumatoid Arthritis on Problem List:  No  Secondary Osteoporosis on Problem List:  No  Consumes 3 or more units of alcohol per day: No  Femoral Neck BMD (g/cm2)            Reviewed and updated as needed this visit by Provider                    Past Medical History:   Diagnosis Date    Advanced directives, counseling/discussion  "06/29/2011    Cervicalgia 08/08/2011    Dysphagia     Hyperlipidemia LDL goal <130 04/15/2011    Hyperlipidemia LDL goal <160 04/15/2011    High HDL    Hypertension     Osteoarthritides     Osteopenia 09/20/2011    Routine general medical examination at a health care facility 09/20/2011    Routine gynecological examination 09/20/2011    Umbilical hernia      Current providers sharing in care for this patient include:  Patient Care Team:  Danielle Borrero PA-C as PCP - General (Physician Assistant)  Brent Casas MD as Family Medicine Physician (Family Practice)  Danielle Borrero PA-C as Assigned PCP  Víctor Sarabia DPM as Assigned Musculoskeletal Provider  Sathish Kelly MD as MD (Neurology)    The following health maintenance items are reviewed in Epic and correct as of today:  Health Maintenance   Topic Date Due    COLORECTAL CANCER SCREENING  10/01/2023    COVID-19 Vaccine (8 - 2023-24 season) 02/29/2024    LIPID  05/30/2024    ANNUAL REVIEW OF HM ORDERS  05/30/2024    MEDICARE ANNUAL WELLNESS VISIT  05/30/2024    MAMMO SCREENING  04/25/2025    FALL RISK ASSESSMENT  06/05/2025    GLUCOSE  04/13/2026    DEXA  02/11/2028    ADVANCE CARE PLANNING  05/30/2028    DTAP/TDAP/TD IMMUNIZATION (3 - Td or Tdap) 07/03/2028    HEPATITIS C SCREENING  Completed    PHQ-2 (once per calendar year)  Completed    INFLUENZA VACCINE  Completed    Pneumococcal Vaccine: 65+ Years  Completed    ZOSTER IMMUNIZATION  Completed    RSV VACCINE (Pregnancy & 60+)  Completed    IPV IMMUNIZATION  Aged Out    HPV IMMUNIZATION  Aged Out    MENINGITIS IMMUNIZATION  Aged Out    RSV MONOCLONAL ANTIBODY  Aged Out         Review of Systems  Constitutional, HEENT, cardiovascular, pulmonary, gi and gu systems are negative, except as otherwise noted.     Objective    Exam  /79 (BP Location: Left arm, Patient Position: Chair, Cuff Size: Adult Regular)   Pulse 72   Temp 97.9  F (36.6  C) (Oral)   Resp 16   Ht 1.664 m (5' 5.5\")   Wt 73.5 " "kg (162 lb)   SpO2 97%   BMI 26.55 kg/m     Estimated body mass index is 26.46 kg/m  as calculated from the following:    Height as of 4/2/24: 1.651 m (5' 5\").    Weight as of 4/2/24: 72.1 kg (159 lb).    Physical Exam  GENERAL: alert and no distress  HENT: ear canals and TM's normal, nose and mouth without ulcers or lesions  NECK: no adenopathy, no asymmetry, masses, or scars  RESP: lungs clear to auscultation - no rales, rhonchi or wheezes  CV: regular rate and rhythm, normal S1 S2, no S3 or S4, no murmur, click or rub, no peripheral edema   ABDOMEN: soft, nontender, no hepatosplenomegaly, no masses and bowel sounds normal  MS: no gross musculoskeletal defects noted, no edema  NEURO: Normal strength and tone, mentation intact and speech normal  PSYCH: mentation appears normal, affect normal/bright        6/5/2024   Mini Cog   Clock Draw Score 2 Normal    2 Normal   3 Item Recall 3 objects recalled    3 objects recalled   Mini Cog Total Score 5    5              Signed Electronically by: Danielle Borrero PA-C    "

## 2024-06-05 NOTE — PATIENT INSTRUCTIONS
"Preventive Care Advice   This is general advice we often give to help people stay healthy. Your care team may have specific advice just for you. Please talk to your care team about your own preventive care needs.  Lifestyle  Exercise at least 150 minutes each week (30 minutes a day, 5 days a week).  Do muscle strengthening activities 2 days a week. These help control your weight and prevent disease.  No smoking.  Wear sunscreen to prevent skin cancer.  Have your home tested for radon every 2 to 5 years. Radon is a colorless, odorless gas that can harm your lungs. To learn more, go to www.health.[facility].[place]. and search for \"Radon in Homes.\"  Keep guns unloaded and locked up in a safe place like a safe or gun vault, or, use a gun lock and hide the keys. Always lock away bullets separately. To learn more, visit [employer].[place].gov and search for \"safe gun storage.\"  Nutrition  Eat 5 or more servings of fruits and vegetables each day.  Try wheat bread, brown rice and whole grain pasta (instead of white bread, rice, and pasta).  Get enough calcium and vitamin D. Check the label on foods and aim for 100% of the RDA (recommended daily allowance).  Regular exams  Have a dental exam and cleaning every 6 months.  See your health care team every year to talk about:  Any changes in your health.  Any medicines your care team has prescribed.  Preventive care, family planning, and ways to prevent chronic diseases.  Shots (vaccines)   HPV shots (up to age 26), if you've never had them before.  Hepatitis B shots (up to age 59), if you've never had them before.  COVID-19 shot: Get this shot when it's due.  Flu shot: Get a flu shot every year.  Tetanus shot: Get a tetanus shot every 10 years.  Pneumococcal, hepatitis A, and RSV shots: Ask your care team if you need these based on your risk.  Shingles shot (for age 50 and up).  General health tests  Diabetes screening:  Starting at age 35, Get screened for diabetes at least every 3 years.  If " you are younger than age 35, ask your care team if you should be screened for diabetes.  Cholesterol test: At age 39, start having a cholesterol test every 5 years, or more often if advised.  Bone density scan (DEXA): At age 50, ask your care team if you should have this scan for osteoporosis (brittle bones).  Hepatitis C: Get tested at least once in your life.  Abdominal aortic aneurysm screening: Talk to your doctor about having this screening if you:  Have ever smoked; and  Are biologically male; and  Are between the ages of 65 and 75.  STIs (sexually transmitted infections)  Before age 24: Ask your care team if you should be screened for STIs.  After age 24: Get screened for STIs if you're at risk. You are at risk for STIs (including HIV) if:  You are sexually active with more than one person.  You don't use condoms every time.  You or a partner was diagnosed with a sexually transmitted infection.  If you are at risk for HIV, ask about PrEP medicine to prevent HIV.  Get tested for HIV at least once in your life, whether you are at risk for HIV or not.  Cancer screening tests  Cervical cancer screening: If you have a cervix, begin getting regular cervical cancer screening tests at age 21. Most people who have regular screenings with normal results can stop after age 65. Talk about this with your provider.  Breast cancer scan (mammogram): If you've ever had breasts, begin having regular mammograms starting at age 40. This is a scan to check for breast cancer.  Colon cancer screening: It is important to start screening for colon cancer at age 45.  Have a colonoscopy test every 10 years (or more often if you're at risk) Or, ask your provider about stool tests like a FIT test every year or Cologuard test every 3 years.  To learn more about your testing options, visit: www.[employer]/180826.pdf.  For help making a decision, visit: [email or web address].  Prostate cancer screening test: If you have a prostate and are age 55  to 69, ask your provider if you would benefit from a yearly prostate cancer screening test.  Lung cancer screening: If you are a current or former smoker age 50 to 80, ask your care team if ongoing lung cancer screenings are right for you.  For informational purposes only. Not to replace the advice of your health care provider. Copyright   2023 [employer]. All rights reserved. Clinically reviewed by the [facility]. Modanisa 126789 - REV 04/24.    Preventing Falls: Care Instructions  Injuries and health problems such as trouble walking or poor eyesight can increase your risk of falling. So can some medicines. But there are things you can do to help prevent falls. You can exercise to get stronger. You can also arrange your home to make it safer.    Talk to your doctor about the medicines you take. Ask if any of them increase the risk of falls and whether they can be changed or stopped.   Try to exercise regularly. It can help improve your strength and balance. This can help lower your risk of falling.     Practice fall safety and prevention.    Wear low-heeled shoes that fit well and give your feet good support. Talk to your doctor if you have foot problems that make this hard.  Carry a cellphone or wear a medical alert device that you can use to call for help.  Use stepladders instead of chairs to reach high objects. Don't climb if you're at risk for falls. Ask for help, if needed.  Wear the correct eyeglasses, if you need them.    Make your home safer.    Remove rugs, cords, clutter, and furniture from walkways.  Keep your house well lit. Use night-lights in hallways and bathrooms.  Install and use sturdy handrails on stairways.  Wear nonskid footwear, even inside. Don't walk barefoot or in socks without shoes.    Be safe outside.    Use handrails, curb cuts, and ramps whenever possible.  Keep your hands free by using a shoulder bag or backpack.  Try to walk in well-lit  "areas. Watch out for uneven ground, changes in pavement, and debris.  Be careful in the winter. Walk on the grass or gravel when sidewalks are slippery. Use de-icer on steps and walkways. Add non-slip devices to shoes.    Put grab bars and nonskid mats in your shower or tub and near the toilet. Try to use a shower chair or bath bench when bathing.   Get into a tub or shower by putting in your weaker leg first. Get out with your strong side first. Have a phone or medical alert device in the bathroom with you.   Where can you learn more?  Go to https://www.[employer]./patiented  Enter G117 in the search box to learn more about \"Preventing Falls: Care Instructions.\"  Current as of: July 17, 2023               Content Version: 14.0    6893-7618 [employer].   Care instructions adapted under license by your healthcare professional. If you have questions about a medical condition or this instruction, always ask your healthcare professional. [employer] disclaims any warranty or liability for your use of this information.      Learning About Sleeping Well  What does sleeping well mean?     Sleeping well means getting enough sleep to feel good and stay healthy. How much sleep is enough varies among people.  The number of hours you sleep and how you feel when you wake up are both important. If you do not feel refreshed, you probably need more sleep. Another sign of not getting enough sleep is feeling tired during the day.  Experts recommend that adults get at least 7 or more hours of sleep per day. Children and older adults need more sleep.  Why is getting enough sleep important?  Getting enough quality sleep is a basic part of good health. When your sleep suffers, your physical health, mood, and your thoughts can suffer too. You may find yourself feeling more grumpy or stressed. Not getting enough sleep also can lead to serious problems, including injury, accidents, anxiety, and " "depression.  What might cause poor sleeping?  Many things can cause sleep problems, including:  Changes to your sleep schedule.  Stress. Stress can be caused by fear about a single event, such as giving a speech. Or you may have ongoing stress, such as worry about work or school.  Depression, anxiety, and other mental or emotional conditions.  Changes in your sleep habits or surroundings. This includes changes that happen where you sleep, such as noise, light, or sleeping in a different bed. It also includes changes in your sleep pattern, such as having jet lag or working a late shift.  Health problems, such as pain, breathing problems, and restless legs syndrome.  Lack of regular exercise.  Using alcohol, nicotine, or caffeine before bed.  How can you help yourself?  Here are some tips that may help you sleep more soundly and wake up feeling more refreshed.  Your sleeping area   Use your bedroom only for sleeping and sex. A bit of light reading may help you fall asleep. But if it doesn't, do your reading elsewhere in the house. Try not to use your TV, computer, smartphone, or tablet while you are in bed.  Be sure your bed is big enough to stretch out comfortably, especially if you have a sleep partner.  Keep your bedroom quiet, dark, and cool. Use curtains, blinds, or a sleep mask to block out light. To block out noise, use earplugs, soothing music, or a \"white noise\" machine.  Your evening and bedtime routine   Create a relaxing bedtime routine. You might want to take a warm shower or bath, or listen to soothing music.  Go to bed at the same time every night. And get up at the same time every morning, even if you feel tired.  What to avoid   Limit caffeine (coffee, tea, caffeinated sodas) during the day, and don't have any for at least 6 hours before bedtime.  Avoid drinking alcohol before bedtime. Alcohol can cause you to wake up more often during the night.  Try not to smoke or use tobacco, especially in the " "evening. Nicotine can keep you awake.  Limit naps during the day, especially close to bedtime.  Avoid lying in bed awake for too long. If you can't fall asleep or if you wake up in the middle of the night and can't get back to sleep within about 20 minutes, get out of bed and go to another room until you feel sleepy.  Avoid taking medicine right before bed that may keep you awake or make you feel hyper or energized. Your doctor can tell you if your medicine may do this and if you can take it earlier in the day.  If you can't sleep   Imagine yourself in a peaceful, pleasant scene. Focus on the details and feelings of being in a place that is relaxing.  Get up and do a quiet or boring activity until you feel sleepy.  Avoid drinking any liquids before going to bed to help prevent waking up often to use the bathroom.  Where can you learn more?  Go to https://www.[employer]/patiented  Enter J942 in the search box to learn more about \"Learning About Sleeping Well.\"  Current as of: July 10, 2023               Content Version: 14.0    1630-8816 [employer].   Care instructions adapted under license by your healthcare professional. If you have questions about a medical condition or this instruction, always ask your healthcare professional. [employer] disclaims any warranty or liability for your use of this information.      Bladder Training: Care Instructions  Your Care Instructions     Bladder training is used to treat urge incontinence and stress incontinence. Urge incontinence means that the need to urinate comes on so fast that you can't get to a toilet in time. Stress incontinence means that you leak urine because of pressure on your bladder. For example, it may happen when you laugh, cough, or lift something heavy.  Bladder training can increase how long you can wait before you have to urinate. It can also help your bladder hold more urine. And it can give you better control over the urge " to urinate.  It is important to remember that bladder training takes a few weeks to a few months to make a difference. You may not see results right away, but don't give up.  Follow-up care is a key part of your treatment and safety. Be sure to make and go to all appointments, and call your doctor if you are having problems. It's also a good idea to know your test results and keep a list of the medicines you take.  How can you care for yourself at home?  Work with your doctor to come up with a bladder training program that is right for you. You may use one or more of the following methods.  Delayed urination  In the beginning, try to keep from urinating for 5 minutes after you first feel the need to go.  While you wait, take deep, slow breaths to relax. Kegel exercises can also help you delay the need to go to the bathroom.  After some practice, when you can easily wait 5 minutes to urinate, try to wait 10 minutes before you urinate.  Slowly increase the waiting period until you are able to control when you have to urinate.  Scheduled urination  Empty your bladder when you first wake up in the morning.  Schedule times throughout the day when you will urinate.  Start by going to the bathroom every hour, even if you don't need to go.  Slowly increase the time between trips to the bathroom.  When you have found a schedule that works well for you, keep doing it.  If you wake up during the night and have to urinate, do it. Apply your schedule to waking hours only.  Kegel exercises  These tighten and strengthen pelvic muscles, which can help you control the flow of urine. (If doing these exercises causes pain, stop doing them and talk with your doctor.) To do Kegel exercises:  Squeeze your muscles as if you were trying not to pass gas. Or squeeze your muscles as if you were stopping the flow of urine. Your belly, legs, and buttocks shouldn't move.  Hold the squeeze for 3 seconds, then relax for 5 to 10 seconds.  Start  "with 3 seconds, then add 1 second each week until you are able to squeeze for 10 seconds.  Repeat the exercise 10 times a session. Do 3 to 8 sessions a day.  When should you call for help?  Watch closely for changes in your health, and be sure to contact your doctor if:    Your incontinence is getting worse.     You do not get better as expected.   Where can you learn more?  Go to https://www."[employer].net/patiented  Enter V684 in the search box to learn more about \"Bladder Training: Care Instructions.\"  Current as of: November 15, 2023               Content Version: 14.0    8210-3700 [employer].   Care instructions adapted under license by your healthcare professional. If you have questions about a medical condition or this instruction, always ask your healthcare professional. [employer] disclaims any warranty or liability for your use of this information.      "

## 2024-06-06 LAB
ALBUMIN SERPL BCG-MCNC: 4.4 G/DL (ref 3.5–5.2)
ALP SERPL-CCNC: 71 U/L (ref 40–150)
ALT SERPL W P-5'-P-CCNC: 15 U/L (ref 0–50)
ANION GAP SERPL CALCULATED.3IONS-SCNC: 10 MMOL/L (ref 7–15)
AST SERPL W P-5'-P-CCNC: 22 U/L (ref 0–45)
BILIRUB SERPL-MCNC: 0.4 MG/DL
BUN SERPL-MCNC: 18.1 MG/DL (ref 8–23)
CALCIUM SERPL-MCNC: 9.8 MG/DL (ref 8.8–10.2)
CHLORIDE SERPL-SCNC: 106 MMOL/L (ref 98–107)
CHOLEST SERPL-MCNC: 235 MG/DL
CREAT SERPL-MCNC: 0.78 MG/DL (ref 0.51–0.95)
DEPRECATED HCO3 PLAS-SCNC: 26 MMOL/L (ref 22–29)
EGFRCR SERPLBLD CKD-EPI 2021: 77 ML/MIN/1.73M2
FASTING STATUS PATIENT QL REPORTED: NO
FASTING STATUS PATIENT QL REPORTED: NO
GLUCOSE SERPL-MCNC: 82 MG/DL (ref 70–99)
HDLC SERPL-MCNC: 87 MG/DL
LDLC SERPL CALC-MCNC: 129 MG/DL
NONHDLC SERPL-MCNC: 148 MG/DL
POTASSIUM SERPL-SCNC: 4.3 MMOL/L (ref 3.4–5.3)
PROT SERPL-MCNC: 7 G/DL (ref 6.4–8.3)
SODIUM SERPL-SCNC: 142 MMOL/L (ref 135–145)
TRIGL SERPL-MCNC: 94 MG/DL
TSH SERPL DL<=0.005 MIU/L-ACNC: 3.3 UIU/ML (ref 0.3–4.2)

## 2024-06-11 ENCOUNTER — HOSPITAL ENCOUNTER (OUTPATIENT)
Facility: CLINIC | Age: 79
Discharge: HOME | End: 2024-06-11
Attending: INTERNAL MEDICINE | Admitting: INTERNAL MEDICINE
Payer: MEDICARE

## 2024-06-11 VITALS
OXYGEN SATURATION: 99 % | SYSTOLIC BLOOD PRESSURE: 132 MMHG | HEART RATE: 76 BPM | DIASTOLIC BLOOD PRESSURE: 74 MMHG | RESPIRATION RATE: 14 BRPM

## 2024-06-11 LAB — COLONOSCOPY: NORMAL

## 2024-06-11 PROCEDURE — 45378 DIAGNOSTIC COLONOSCOPY: CPT | Performed by: INTERNAL MEDICINE

## 2024-06-11 PROCEDURE — 250N000011 HC RX IP 250 OP 636: Mod: JZ | Performed by: INTERNAL MEDICINE

## 2024-06-11 PROCEDURE — G0500 MOD SEDAT ENDO SERVICE >5YRS: HCPCS | Performed by: INTERNAL MEDICINE

## 2024-06-11 PROCEDURE — G0121 COLON CA SCRN NOT HI RSK IND: HCPCS | Performed by: INTERNAL MEDICINE

## 2024-06-11 RX ORDER — NALOXONE HYDROCHLORIDE 0.4 MG/ML
0.2 INJECTION, SOLUTION INTRAMUSCULAR; INTRAVENOUS; SUBCUTANEOUS
Status: DISCONTINUED | OUTPATIENT
Start: 2024-06-11 | End: 2024-06-11 | Stop reason: HOSPADM

## 2024-06-11 RX ORDER — FLUMAZENIL 0.1 MG/ML
0.2 INJECTION, SOLUTION INTRAVENOUS
Status: DISCONTINUED | OUTPATIENT
Start: 2024-06-11 | End: 2024-06-11 | Stop reason: HOSPADM

## 2024-06-11 RX ORDER — ONDANSETRON 4 MG/1
4 TABLET, ORALLY DISINTEGRATING ORAL EVERY 6 HOURS PRN
Status: DISCONTINUED | OUTPATIENT
Start: 2024-06-11 | End: 2024-06-11 | Stop reason: HOSPADM

## 2024-06-11 RX ORDER — NALOXONE HYDROCHLORIDE 0.4 MG/ML
0.4 INJECTION, SOLUTION INTRAMUSCULAR; INTRAVENOUS; SUBCUTANEOUS
Status: DISCONTINUED | OUTPATIENT
Start: 2024-06-11 | End: 2024-06-11 | Stop reason: HOSPADM

## 2024-06-11 RX ORDER — SIMETHICONE 40MG/0.6ML
133 SUSPENSION, DROPS(FINAL DOSAGE FORM)(ML) ORAL
Status: DISCONTINUED | OUTPATIENT
Start: 2024-06-11 | End: 2024-06-11 | Stop reason: HOSPADM

## 2024-06-11 RX ORDER — ATROPINE SULFATE 0.1 MG/ML
1 INJECTION INTRAVENOUS
Status: DISCONTINUED | OUTPATIENT
Start: 2024-06-11 | End: 2024-06-11 | Stop reason: HOSPADM

## 2024-06-11 RX ORDER — DIPHENHYDRAMINE HYDROCHLORIDE 50 MG/ML
25-50 INJECTION INTRAMUSCULAR; INTRAVENOUS
Status: DISCONTINUED | OUTPATIENT
Start: 2024-06-11 | End: 2024-06-11 | Stop reason: HOSPADM

## 2024-06-11 RX ORDER — PROCHLORPERAZINE MALEATE 5 MG
5 TABLET ORAL EVERY 6 HOURS PRN
Status: DISCONTINUED | OUTPATIENT
Start: 2024-06-11 | End: 2024-06-11 | Stop reason: HOSPADM

## 2024-06-11 RX ORDER — FENTANYL CITRATE 50 UG/ML
50-100 INJECTION, SOLUTION INTRAMUSCULAR; INTRAVENOUS EVERY 5 MIN PRN
Status: DISCONTINUED | OUTPATIENT
Start: 2024-06-11 | End: 2024-06-11 | Stop reason: HOSPADM

## 2024-06-11 RX ORDER — EPINEPHRINE 1 MG/ML
0.1 INJECTION, SOLUTION INTRAMUSCULAR; SUBCUTANEOUS
Status: DISCONTINUED | OUTPATIENT
Start: 2024-06-11 | End: 2024-06-11 | Stop reason: HOSPADM

## 2024-06-11 RX ORDER — ONDANSETRON 2 MG/ML
4 INJECTION INTRAMUSCULAR; INTRAVENOUS EVERY 6 HOURS PRN
Status: DISCONTINUED | OUTPATIENT
Start: 2024-06-11 | End: 2024-06-11 | Stop reason: HOSPADM

## 2024-06-11 RX ORDER — ONDANSETRON 2 MG/ML
4 INJECTION INTRAMUSCULAR; INTRAVENOUS
Status: DISCONTINUED | OUTPATIENT
Start: 2024-06-11 | End: 2024-06-11 | Stop reason: HOSPADM

## 2024-06-11 RX ORDER — LIDOCAINE 40 MG/G
CREAM TOPICAL
Status: DISCONTINUED | OUTPATIENT
Start: 2024-06-11 | End: 2024-06-11 | Stop reason: HOSPADM

## 2024-06-11 RX ADMIN — MIDAZOLAM 1 MG: 1 INJECTION INTRAMUSCULAR; INTRAVENOUS at 12:36

## 2024-06-11 RX ADMIN — FENTANYL CITRATE 50 MCG: 50 INJECTION, SOLUTION INTRAMUSCULAR; INTRAVENOUS at 12:36

## 2024-06-11 RX ADMIN — MIDAZOLAM 1 MG: 1 INJECTION INTRAMUSCULAR; INTRAVENOUS at 12:32

## 2024-06-11 RX ADMIN — FENTANYL CITRATE 50 MCG: 50 INJECTION, SOLUTION INTRAMUSCULAR; INTRAVENOUS at 12:32

## 2024-06-11 ASSESSMENT — ACTIVITIES OF DAILY LIVING (ADL)
ADLS_ACUITY_SCORE: 35
ADLS_ACUITY_SCORE: 35

## 2024-06-11 NOTE — H&P
Pre-Endoscopy History and Physical     Alix Escobar MRN# [id number]   YOB: 1945 Age: 78 year old     Date of Procedure: 6/11/2024  Primary care provider: Danielle Borrero  Type of Endoscopy: Colonoscopy with possible biopsy, possible polypectomy  Reason for Procedure: screen  Type of Anesthesia Anticipated: Conscious Sedation    HPI:    Alix is a 78 year old female who will be undergoing the above procedure.      A history and physical has been performed. The patient's medications and allergies have been reviewed. The risks and benefits of the procedure and the sedation options and risks were discussed with the patient.  All questions were answered and informed consent was obtained.      She denies a personal or family history of anesthesia complications or bleeding disorders.     Patient Active Problem List   Diagnosis    Hyperlipidemia LDL goal <160    Advance Care Planning    Cervicalgia    Osteopenia    Gastroesophageal reflux disease, esophagitis presence not specified    Onychomycosis    Radial scar of left breast    Elevated blood pressure reading without diagnosis of hypertension    Benign essential hypertension    Tinea pedis of both feet        Past Medical History:   Diagnosis Date    Advanced directives, counseling/discussion 06/29/2011    Cervicalgia 08/08/2011    Dysphagia     Hyperlipidemia LDL goal <130 04/15/2011    Hyperlipidemia LDL goal <160 04/15/2011    High HDL    Hypertension     Osteoarthritides     Osteopenia 09/20/2011    Routine general medical examination at a health care facility 09/20/2011    Routine gynecological examination 09/20/2011    Umbilical hernia         Past Surgical History:   Procedure Laterality Date    BIOPSY      benign breast, right breast.2003    BIOPSY BREAST SEED LOCALIZATION Left 1/7/2021    Procedure: LEFT BREAST SEED LOCALIZED EXCISIONAL BIOPSY;  Surgeon: Jam Wright MD;  Location: [facility] OR    BREAST SURGERY  1985    benign lump removed.  Left breast.1985    COLONOSCOPY  10/1/2013    Procedure: COLONOSCOPY;  Colonoscopy ;  Surgeon: Adair Stoddard MD;  Location: [facility] GI    EXCISE MASS UPPER EXTREMITY  2013    Procedure: EXCISE MASS UPPER EXTREMITY;  Remove Mass Right Upper Arm;  Surgeon: Elvira Wilson MD;  Location: [facility] OR    EYE SURGERY      lasik    wisdom teeth         Social History     Tobacco Use    Smoking status: Never    Smokeless tobacco: Never   Substance Use Topics    Alcohol use: No       Family History   Problem Relation Age of Onset    Cerebrovascular Disease Father             C.A.D. Mother     Coronary Artery Disease Mother          at 103 years old, CHF    Unknown/Adopted Maternal Grandmother         I don't know what disease this refers to.  She  in childbirth.    Hypertension Maternal Grandfather     Hypertension Paternal Grandfather     Blood Disease Sister     Neurologic Disorder Sister         epilepsy    Arthritis Other         neck and thumb joints       Prior to Admission medications    Medication Sig Start Date End Date Taking? Authorizing Provider   calcium citrate-vitamin D (CITRACAL) 315-200 MG-UNIT TABS Take 1 tablet by mouth daily    Reported, Patient   capsaicin (ZOSTRIX) 0.025 % external cream 1 gram to affected areas on feet 2-3 times daily as needed for burning pain. 24   Víctor Sarabia, LINDSAY   Capsicum, Cayenne, (CAYENNE PO) Take 2 tablets by mouth daily    Reported, Patient   co-enzyme Q-10 50 MG CAPS Take 1 tablet by mouth daily    Reported, Patient   losartan (COZAAR) 25 MG tablet Take 1 tablet (25 mg) by mouth daily 24   Danielle Borrero, PAClaytonC   multivitamin w/minerals (THERA-VIT-M) tablet Take 1 tablet by mouth daily    Reported, Patient   naproxen sodium (ANAPROX) 220 MG tablet Take 220 mg by mouth 2 times daily (with meals)    Reported, Patient       Allergies   Allergen Reactions    Seasonal Allergies         REVIEW OF SYSTEMS:   5 point ROS negative except as  "noted above in HPI, including Gen., Resp., CV, GI &  system review.    PHYSICAL EXAM:   There were no vitals taken for this visit. Estimated body mass index is 26.55 kg/m  as calculated from the following:    Height as of 6/5/24: 1.664 m (5' 5.5\").    Weight as of 6/5/24: 73.5 kg (162 lb).   GENERAL APPEARANCE: alert, and oriented  MENTAL STATUS: alert  AIRWAY EXAM: Mallampatti Class I (visualization of the soft palate, fauces, uvula, anterior and posterior pillars)  RESP: lungs clear to auscultation - no rales, rhonchi or wheezes  CV: regular rates and rhythm  DIAGNOSTICS:    Not indicated    IMPRESSION   ASA Class 2 - Mild systemic disease    PLAN:   Plan for Colonoscopy with possible biopsy, possible polypectomy. We discussed the risks, benefits and alternatives and the patient wished to proceed.    The above has been forwarded to the consulting provider.      Signed Electronically by: Adair Stoddard MD  June 11, 2024          "

## 2024-07-01 ENCOUNTER — ANCILLARY PROCEDURE (OUTPATIENT)
Dept: GENERAL RADIOLOGY | Facility: CLINIC | Age: 79
End: 2024-07-01
Attending: PHYSICIAN ASSISTANT
Payer: MEDICARE

## 2024-07-01 ENCOUNTER — OFFICE VISIT (OUTPATIENT)
Dept: URGENT CARE | Facility: URGENT CARE | Age: 79
End: 2024-07-01
Payer: MEDICARE

## 2024-07-01 ENCOUNTER — MYC MEDICAL ADVICE (OUTPATIENT)
Dept: FAMILY MEDICINE | Facility: CLINIC | Age: 79
End: 2024-07-01
Payer: MEDICARE

## 2024-07-01 VITALS
SYSTOLIC BLOOD PRESSURE: 124 MMHG | HEART RATE: 74 BPM | OXYGEN SATURATION: 96 % | DIASTOLIC BLOOD PRESSURE: 84 MMHG | TEMPERATURE: 97.1 F | RESPIRATION RATE: 16 BRPM

## 2024-07-01 DIAGNOSIS — S92.502A CLOSED FRACTURE OF PHALANX OF LEFT SECOND TOE, INITIAL ENCOUNTER: Primary | ICD-10-CM

## 2024-07-01 DIAGNOSIS — M79.675 PAIN OF TOE OF LEFT FOOT: ICD-10-CM

## 2024-07-01 PROCEDURE — 99213 OFFICE O/P EST LOW 20 MIN: CPT | Performed by: PHYSICIAN ASSISTANT

## 2024-07-01 PROCEDURE — 73660 X-RAY EXAM OF TOE(S): CPT | Mod: TC | Performed by: RADIOLOGY

## 2024-07-01 NOTE — PATIENT INSTRUCTIONS
It looks like you have a fracture of your toe  I will have you wear the post op shoe for 4 weeks  RICE therapy, including (rest, ice, compression, elevation)   Over-the-counter analgesics (Tylenol or Ibuprofen) as needed.   Follow-up with PCP in 2 weeks for a recheck   Seek emergency care if you develop severe pain/swelling, inability to move extremity, numbness / tingling, weakness, skin paleness, or icy cold extremity.

## 2024-08-05 ENCOUNTER — MYC MEDICAL ADVICE (OUTPATIENT)
Dept: PODIATRY | Facility: CLINIC | Age: 79
End: 2024-08-05
Payer: MEDICARE

## 2024-08-05 DIAGNOSIS — B35.3 TINEA PEDIS OF BOTH FEET: Primary | ICD-10-CM

## 2024-08-05 RX ORDER — CLOTRIMAZOLE AND BETAMETHASONE DIPROPIONATE 10; .64 MG/G; MG/G
1 CREAM TOPICAL 2 TIMES DAILY
Qty: 60 G | Refills: 2 | Status: SHIPPED | OUTPATIENT
Start: 2024-08-05 | End: 2024-11-03

## 2024-08-21 ENCOUNTER — ANCILLARY PROCEDURE (OUTPATIENT)
Dept: BONE DENSITY | Facility: CLINIC | Age: 79
End: 2024-08-21
Attending: PHYSICIAN ASSISTANT
Payer: MEDICARE

## 2024-08-21 DIAGNOSIS — Z78.0 ENCOUNTER FOR OSTEOPOROSIS SCREENING IN ASYMPTOMATIC POSTMENOPAUSAL PATIENT: ICD-10-CM

## 2024-08-21 DIAGNOSIS — Z13.820 ENCOUNTER FOR OSTEOPOROSIS SCREENING IN ASYMPTOMATIC POSTMENOPAUSAL PATIENT: ICD-10-CM

## 2024-08-21 DIAGNOSIS — M85.89 OSTEOPENIA OF MULTIPLE SITES: Primary | ICD-10-CM

## 2024-08-21 PROCEDURE — 77080 DXA BONE DENSITY AXIAL: CPT | Mod: TC | Performed by: PHYSICIAN ASSISTANT

## 2024-08-21 RX ORDER — ALENDRONATE SODIUM 70 MG/1
70 TABLET ORAL
Qty: 12 TABLET | Refills: 3 | Status: SHIPPED | OUTPATIENT
Start: 2024-08-21

## 2024-09-02 ENCOUNTER — MYC MEDICAL ADVICE (OUTPATIENT)
Dept: FAMILY MEDICINE | Facility: CLINIC | Age: 79
End: 2024-09-02
Payer: MEDICARE

## 2024-10-11 PROBLEM — B35.3 TINEA PEDIS OF BOTH FEET: Status: RESOLVED | Noted: 2023-04-18 | Resolved: 2024-10-11

## 2024-10-11 PROBLEM — H90.3 BILATERAL SENSORINEURAL HEARING LOSS: Status: ACTIVE | Noted: 2024-10-11

## 2024-10-11 PROBLEM — R03.0 ELEVATED BLOOD PRESSURE READING WITHOUT DIAGNOSIS OF HYPERTENSION: Status: RESOLVED | Noted: 2022-11-28 | Resolved: 2024-10-11

## 2024-10-11 NOTE — PROGRESS NOTES
NEUROLOGY CONSULTATION NOTE       [facility] NEUROLOGY [place]  [address] [place] [address]  Tel: [phone]  Fax: [phone]  [email or web address]     Alix Escobar,  1945, MRN [id number]  PCP: Danielle Borrero  Date: 10/14/2024     ASSESSMENT & PLAN     Visit Diagnosis  Paresthesia     Paresthesias; R/O RLS, peripheral neuropathy  79-year-old female with HLD, osteopenia, GERD, HTN who was referred for evaluation of bilateral feet paresthesias that tend to bother her more at night.  Her description and history raises the possibility of peripheral neuropathy.  Additionally some aspect of her history raise the possibility of restless leg syndrome.  I have recommended:    1.  EMG of bilateral lower extremity  2.  Lab work to include antinuclear antibody, ferritin, folate, hemoglobin A1c, MMA, rheumatoid factor, vitamin B1, B6 and B12  3.  Follow-up the day she is scheduled for EMG    Thank you again for this referral, please feel free to contact me if you have any questions.    Sathish Kelly MD  [facility]     REASON FOR CONSULTATION Neurologic Problem        HISTORY OF PRESENT ILLNESS     We have been requested by Víctor Sarabia to evaluate Alix Escobar who is a 79 year old  female for paresthesia    Patient is a pleasant 79-year-old female with history of HLD, osteopenia, GERD, HTN who was referred for evaluation of bilateral feet numbness and tingling that tends to bother her more at night who returns for follow-up.  According to patient her symptoms started about a year ago.  She started noticing restless nests in her leg especially when she would sit down or lay down at night.  She would put a warm cloth on her feet to get some relief.  During daytime as long as she is moving she did not have any symptoms.  She denies any weakness, autonomic symptoms or any change in skin color.  There is no history of diabetes or exposure to any chemical  or chemotherapeutic agent.  She also denies alcohol use.     PROBLEM LIST   Patient Active Problem List   Diagnosis    Hyperlipidemia LDL goal <160    Cervicalgia    Osteopenia    Gastroesophageal reflux disease, esophagitis presence not specified    Onychomycosis    Radial scar of left breast    Benign essential hypertension    Bilateral sensorineural hearing loss         PAST MEDICAL & SURGICAL HISTORY     Past Medical History:   Patient  has a past medical history of Advanced directives, counseling/discussion (06/29/2011), Cervicalgia (08/08/2011), Dysphagia, Hyperlipidemia LDL goal <130 (04/15/2011), Hyperlipidemia LDL goal <160 (04/15/2011), Hypertension, Osteoarthritides, Osteopenia (09/20/2011), Routine general medical examination at a health care facility (09/20/2011), Routine gynecological examination (09/20/2011), and Umbilical hernia.    Surgical History:  She  has a past surgical history that includes Excise mass upper extremity (5/20/2013); Colonoscopy (10/1/2013); wisdom teeth; biopsy; Breast surgery (1985); Eye surgery; Biopsy Breast Seed Localization (Left, 1/7/2021); and Colonoscopy (N/A, 6/11/2024).     SOCIAL HISTORY     Reviewed, and she  reports that she has never smoked. She has never used smokeless tobacco. She reports that she does not drink alcohol and does not use drugs.     FAMILY HISTORY     Reviewed, and family history includes Arthritis in an other family member; Blood Disease in her sister; C.A.D. in her mother; Cerebrovascular Disease in her father; Coronary Artery Disease in her mother; Hypertension in her maternal grandfather and paternal grandfather; Neurologic Disorder in her sister; Unknown/Adopted in her maternal grandmother.     ALLERGIES     Allergies   Allergen Reactions    Seasonal Allergies          REVIEW OF SYSTEMS     A 12 point review of system was performed and was negative except as outlined in the history of present illness.     HOME MEDICATIONS     Current  Outpatient Rx   Medication Sig Dispense Refill    alendronate (FOSAMAX) 70 MG tablet Take 1 tablet (70 mg) by mouth every 7 days. 12 tablet 3    calcium citrate-vitamin D (CITRACAL) 315-200 MG-UNIT TABS Take 1 tablet by mouth daily      capsaicin (ZOSTRIX) 0.025 % external cream 1 gram to affected areas on feet 2-3 times daily as needed for burning pain. 45 g 3    Capsicum, Cayenne, (CAYENNE PO) Take 2 tablets by mouth daily      clotrimazole-betamethasone (LOTRISONE) 1-0.05 % external cream Apply 1 g topically 2 times daily for 90 days To affected areas on feet 60 g 2    co-enzyme Q-10 50 MG CAPS Take 1 tablet by mouth daily      losartan (COZAAR) 25 MG tablet Take 1 tablet (25 mg) by mouth daily 90 tablet 3    multivitamin w/minerals (THERA-VIT-M) tablet Take 1 tablet by mouth daily      naproxen sodium (ANAPROX) 220 MG tablet Take 220 mg by mouth 2 times daily (with meals)           PHYSICAL EXAM     Vital signs  /67   Pulse 69   Wt 72.5 kg (159 lb 14.4 oz)   BMI 26.20 kg/m      Weight:   159 lbs 14.4 oz    Elderly female who is alert and oriented vital signs are reviewed and documented in electronic medical record.  Neck supple.  Neurologically speech was normal.  Cranial nerves II through XII are intact.  Motor strength 5/5.  Reflexes 2+ except ankles 1+ sensation decreased to temperature and vibratory sensation.  Pinprick sensation was normal.  No dysmetria noted on finger-nose testing gait normal Romberg negative     PERTINENT DIAGNOSTIC STUDIES     Following studies were reviewed:     No recent imaging studies to review     PERTINENT LABS  Following labs were reviewed:  No visits with results within 3 Month(s) from this visit.   Latest known visit with results is:   Admission on 06/11/2024, Discharged on 06/11/2024   Component Date Value Ref Range Status    COLONOSCOPY 06/11/2024    Final                    Value:[facility]  Hospital  _______________________________________________________________________________  Patient Name: Alix Escobar        Procedure Date: 6/11/2024 12:10 PM  MRN: [id number]                       Account Number: [id number]  YOB: 1945              Admit Type: Outpatient  Age: 78                               Gender: Female  Attending MD: GABRIEL SUAZO MD,  Total Sedation Time: 16_minutes continuous   bedside 1:1  Instrument Name: 222 - Adult Colonoscope   _______________________________________________________________________________     Procedure:                Colonoscopy  Indications:              Screening for colorectal malignant neoplasm  Providers:                GABRIEL SUAZO MD (Doctor)  Referring MD:             RAJESH RIOS MD (Referring MD)  Medicines:                Midazolam 2 mg IV, Fentanyl 100 micrograms IV  Complications:            No immediate complications.  ______________________________________________                          _________________________________  Procedure:                Pre-Anesthesia Assessment:                            - Prior to the procedure, a History and Physical                             was performed, and patient medications and                             allergies were reviewed. The patient is competent.                             The risks and benefits of the procedure and the                             sedation options and risks were discussed with the                             patient. All questions were answered and informed                             consent was obtained. Patient identification and                             proposed procedure were verified by the physician                             in the procedure room. Mental Status Examination:                             alert and oriented. Airway Examination: normal                             oropharyngeal airway and neck mobility. Respiratory                              Examination: clear to auscultation. C                          V Examination:                             normal. Prophylactic Antibiotics: The patient does                             not require prophylactic antibiotics. Prior                             Anticoagulants: The patient has taken no                             anticoagulant or antiplatelet agents. ASA Grade                             Assessment: II - A patient with mild systemic                             disease. After reviewing the risks and benefits,                             the patient was deemed in satisfactory condition to                             undergo the procedure. The anesthesia plan was to                             use moderate sedation / analgesia (conscious                             sedation). Immediately prior to administration of                             medications, the patient was re-assessed for                             adequacy to receive sedatives. The heart rate,                             respiratory rate, oxygen saturations, blood                                                       pressure, adequacy of pulmonary ventilation, and                             response to care were monitored throughout the                             procedure. The physical status of the patient was                             re-assessed after the procedure.                            After obtaining informed consent, the colonoscope                             was passed under direct vision. Throughout the                             procedure, the patient's blood pressure, pulse, and                             oxygen saturations were monitored continuously. The                             Olympus Adult Colonoscope, Model # CF-AT140B,                             Censitrac # [id number] was introduced through the                             anus and advanced to the cecum, identified by                              appendiceal orifice and ileocecal valve. The                             colonoscopy was performed without difficulty. The                                                       patient tolerated the procedure well. The quality                             of the bowel preparation was good. The ileocecal                             valve, appendiceal orifice, and rectum were                             photographed.                                                                                   Findings:       The perianal and digital rectal examinations were normal.       The entire examined colon appeared normal on direct and retroflexion        views.                                                                                   Impression:               - The entire examined colon is normal on direct and                             retroflexion views.                            - No specimens collected.  Recommendation:           - No repeat colonoscopy.                                                                                   Procedure Code(s):       --- Professional ---       , Colorectal cancer screening; colonoscopy on individual not                                  meeting criteria for high risk  Diagnosis Code(s):       --- Professional ---       Z12.11, Encounter for screening for malignant neoplasm of colon    CPT copyright 2022 [employer]. All rights reserved.    The codes documented in this report are preliminary and upon  review may   be revised to meet current compliance requirements.    Electronically signed by Gabriel Stoddard MD  ____________________  GABRIEL STODDARD MD  6/11/2024 12:52:04 PM  I was physically present for the entire viewing portion of the exam.  __________________________GABRIEL STODDARD MD  Number of Addenda: 0    Note Initiated On: 6/11/2024 12:10 PM  MRN:                      [id number]  Procedure Date:           6/11/2024 12:10:57  PM  Scope Withdrawal Time: 0 hours 6 minutes 40 seconds   Total Procedure Duration: 0 hours 14 minutes 43 seconds   Estimated Blood Loss:       Scope In: 12:33:23 PM  Scope Out: 12:48:06 PM          Total time spent for face to face visit, reviewing labs/imaging studies, counseling and coordination of care was: 1 Hour spent on the date of the encounter doing chart review, review of outside records, review of test results, interpretation of tests, patient visit, and documentation     The longitudinal plan of care for the diagnosis(es)/condition(s) as documented were addressed during this visit. Due to the added complexity in care, I will continue to support Alix in the subsequent management and with ongoing continuity of care.    This note was dictated using voice recognition software.  Any grammatical or context distortions are unintentional and inherent to the software.    Orders Placed This Encounter   Procedures    Folate    Hemoglobin A1c    Anti Nuclear Zahida IgG by IFA with Reflex    Methylmalonic Acid    Vitamin B1 whole blood    Vitamin B12    Vitamin B6    Rheumatoid factor    Ferritin    EMG      New Prescriptions    No medications on file      Modified Medications    No medications on file

## 2024-10-14 ENCOUNTER — OFFICE VISIT (OUTPATIENT)
Dept: NEUROLOGY | Facility: CLINIC | Age: 79
End: 2024-10-14
Attending: PODIATRIST
Payer: MEDICARE

## 2024-10-14 ENCOUNTER — LAB (OUTPATIENT)
Dept: LAB | Facility: HOSPITAL | Age: 79
End: 2024-10-14
Payer: MEDICARE

## 2024-10-14 VITALS
DIASTOLIC BLOOD PRESSURE: 67 MMHG | HEART RATE: 69 BPM | WEIGHT: 159.9 LBS | SYSTOLIC BLOOD PRESSURE: 111 MMHG | BODY MASS INDEX: 26.2 KG/M2

## 2024-10-14 DIAGNOSIS — R79.9 ABNORMAL FINDING OF BLOOD CHEMISTRY, UNSPECIFIED: ICD-10-CM

## 2024-10-14 DIAGNOSIS — R20.2 PARESTHESIA: ICD-10-CM

## 2024-10-14 DIAGNOSIS — R20.2 PARESTHESIA: Primary | ICD-10-CM

## 2024-10-14 LAB
EST. AVERAGE GLUCOSE BLD GHB EST-MCNC: 114 MG/DL
FERRITIN SERPL-MCNC: 53 NG/ML (ref 11–328)
FOLATE SERPL-MCNC: 27.2 NG/ML (ref 4.6–34.8)
HBA1C MFR BLD: 5.6 %
RHEUMATOID FACT SERPL-ACNC: <10 IU/ML
VIT B12 SERPL-MCNC: 577 PG/ML (ref 232–1245)

## 2024-10-14 PROCEDURE — 82728 ASSAY OF FERRITIN: CPT

## 2024-10-14 PROCEDURE — 82746 ASSAY OF FOLIC ACID SERUM: CPT

## 2024-10-14 PROCEDURE — 83921 ORGANIC ACID SINGLE QUANT: CPT

## 2024-10-14 PROCEDURE — 86038 ANTINUCLEAR ANTIBODIES: CPT

## 2024-10-14 PROCEDURE — 84207 ASSAY OF VITAMIN B-6: CPT

## 2024-10-14 PROCEDURE — 36415 COLL VENOUS BLD VENIPUNCTURE: CPT

## 2024-10-14 PROCEDURE — G2211 COMPLEX E/M VISIT ADD ON: HCPCS | Performed by: PSYCHIATRY & NEUROLOGY

## 2024-10-14 PROCEDURE — 86431 RHEUMATOID FACTOR QUANT: CPT

## 2024-10-14 PROCEDURE — 99205 OFFICE O/P NEW HI 60 MIN: CPT | Performed by: PSYCHIATRY & NEUROLOGY

## 2024-10-14 PROCEDURE — 84425 ASSAY OF VITAMIN B-1: CPT

## 2024-10-14 PROCEDURE — 82607 VITAMIN B-12: CPT

## 2024-10-14 PROCEDURE — 83036 HEMOGLOBIN GLYCOSYLATED A1C: CPT

## 2024-10-14 NOTE — LETTER
10/14/2024      Alix Escobar  [address]  [facility] [address]      Dear Colleague,    Thank you for referring your patient, Alix Escobar, to the [facility] [place]. Please see a copy of my visit note below.    NEUROLOGY CONSULTATION NOTE       [facility]UR[facility] [place]  [address] [place] [address]  Tel: [phone]  Fax: [phone]  [email or web address]     Alix Escobar,  1945, MRN [id number]  PCP: Danielle Borrero  Date: 10/14/2024     ASSESSMENT & PLAN     Visit Diagnosis  Paresthesia     Paresthesias; R/O RLS, peripheral neuropathy  79-year-old female with HLD, osteopenia, GERD, HTN who was referred for evaluation of bilateral feet paresthesias that tend to bother her more at night.  Her description and history raises the possibility of peripheral neuropathy.  Additionally some aspect of her history raise the possibility of restless leg syndrome.  I have recommended:    1.  EMG of bilateral lower extremity  2.  Lab work to include antinuclear antibody, ferritin, folate, hemoglobin A1c, MMA, rheumatoid factor, vitamin B1, B6 and B12  3.  Follow-up the day she is scheduled for EMG    Thank you again for this referral, please feel free to contact me if you have any questions.    Sathish Kelly MD  [facility] NEUROLOGY, [place]     REASON FOR CONSULTATION Neurologic Problem        HISTORY OF PRESENT ILLNESS     We have been requested by Víctor Sarabia to evaluate Alix Escobar who is a 79 year old  female for paresthesia    Patient is a pleasant 79-year-old female with history of HLD, osteopenia, GERD, HTN who was referred for evaluation of bilateral feet numbness and tingling that tends to bother her more at night who returns for follow-up.  According to patient her symptoms started about a year ago.  She started noticing restless nests in her leg especially when she would sit down or lay down at night.  She would  put a warm cloth on her feet to get some relief.  During daytime as long as she is moving she did not have any symptoms.  She denies any weakness, autonomic symptoms or any change in skin color.  There is no history of diabetes or exposure to any chemical or chemotherapeutic agent.  She also denies alcohol use.     PROBLEM LIST   Patient Active Problem List   Diagnosis    Hyperlipidemia LDL goal <160    Cervicalgia    Osteopenia    Gastroesophageal reflux disease, esophagitis presence not specified    Onychomycosis    Radial scar of left breast    Benign essential hypertension    Bilateral sensorineural hearing loss         PAST MEDICAL & SURGICAL HISTORY     Past Medical History:   Patient  has a past medical history of Advanced directives, counseling/discussion (06/29/2011), Cervicalgia (08/08/2011), Dysphagia, Hyperlipidemia LDL goal <130 (04/15/2011), Hyperlipidemia LDL goal <160 (04/15/2011), Hypertension, Osteoarthritides, Osteopenia (09/20/2011), Routine general medical examination at a health care facility (09/20/2011), Routine gynecological examination (09/20/2011), and Umbilical hernia.    Surgical History:  She  has a past surgical history that includes Excise mass upper extremity (5/20/2013); Colonoscopy (10/1/2013); wisdom teeth; biopsy; Breast surgery (1985); Eye surgery; Biopsy Breast Seed Localization (Left, 1/7/2021); and Colonoscopy (N/A, 6/11/2024).     SOCIAL HISTORY     Reviewed, and she  reports that she has never smoked. She has never used smokeless tobacco. She reports that she does not drink alcohol and does not use drugs.     FAMILY HISTORY     Reviewed, and family history includes Arthritis in an other family member; Blood Disease in her sister; C.A.D. in her mother; Cerebrovascular Disease in her father; Coronary Artery Disease in her mother; Hypertension in her maternal grandfather and paternal grandfather; Neurologic Disorder in her sister; Unknown/Adopted in her maternal grandmother.      ALLERGIES     Allergies   Allergen Reactions    Seasonal Allergies          REVIEW OF SYSTEMS     A 12 point review of system was performed and was negative except as outlined in the history of present illness.     HOME MEDICATIONS     Current Outpatient Rx   Medication Sig Dispense Refill    alendronate (FOSAMAX) 70 MG tablet Take 1 tablet (70 mg) by mouth every 7 days. 12 tablet 3    calcium citrate-vitamin D (CITRACAL) 315-200 MG-UNIT TABS Take 1 tablet by mouth daily      capsaicin (ZOSTRIX) 0.025 % external cream 1 gram to affected areas on feet 2-3 times daily as needed for burning pain. 45 g 3    Capsicum, Cayenne, (CAYENNE PO) Take 2 tablets by mouth daily      clotrimazole-betamethasone (LOTRISONE) 1-0.05 % external cream Apply 1 g topically 2 times daily for 90 days To affected areas on feet 60 g 2    co-enzyme Q-10 50 MG CAPS Take 1 tablet by mouth daily      losartan (COZAAR) 25 MG tablet Take 1 tablet (25 mg) by mouth daily 90 tablet 3    multivitamin w/minerals (THERA-VIT-M) tablet Take 1 tablet by mouth daily      naproxen sodium (ANAPROX) 220 MG tablet Take 220 mg by mouth 2 times daily (with meals)           PHYSICAL EXAM     Vital signs  /67   Pulse 69   Wt 72.5 kg (159 lb 14.4 oz)   BMI 26.20 kg/m      Weight:   159 lbs 14.4 oz    Elderly female who is alert and oriented vital signs are reviewed and documented in electronic medical record.  Neck supple.  Neurologically speech was normal.  Cranial nerves II through XII are intact.  Motor strength 5/5.  Reflexes 2+ except ankles 1+ sensation decreased to temperature and vibratory sensation.  Pinprick sensation was normal.  No dysmetria noted on finger-nose testing gait normal Romberg negative     PERTINENT DIAGNOSTIC STUDIES     Following studies were reviewed:     No recent imaging studies to review     PERTINENT LABS  Following labs were reviewed:  No visits with results within 3 Month(s) from this visit.   Latest known visit with  results is:   Admission on 06/11/2024, Discharged on 06/11/2024   Component Date Value Ref Range Status    COLONOSCOPY 06/11/2024    Final                    Value:[facility]  _______________________________________________________________________________  Patient Name: Alix Escobar        Procedure Date: 6/11/2024 12:10 PM  MRN: [id number]                       Account Number: [id number]  YOB: 1945              Admit Type: Outpatient  Age: 78                               Gender: Female  Attending MD: GABRIEL SUAZO MD,  Total Sedation Time: 16_minutes continuous   bedside 1:1  Instrument Name: 222 - Adult Colonoscope   _______________________________________________________________________________     Procedure:                Colonoscopy  Indications:              Screening for colorectal malignant neoplasm  Providers:                GABRIEL SUAZO MD (Doctor)  Referring MD:             RAJESH RIOS MD (Referring MD)  Medicines:                Midazolam 2 mg IV, Fentanyl 100 micrograms IV  Complications:            No immediate complications.  ______________________________________________                          _________________________________  Procedure:                Pre-Anesthesia Assessment:                            - Prior to the procedure, a History and Physical                             was performed, and patient medications and                             allergies were reviewed. The patient is competent.                             The risks and benefits of the procedure and the                             sedation options and risks were discussed with the                             patient. All questions were answered and informed                             consent was obtained. Patient identification and                             proposed procedure were verified by the physician                             in the procedure room. Mental  Status Examination:                             alert and oriented. Airway Examination: normal                             oropharyngeal airway and neck mobility. Respiratory                             Examination: clear to auscultation. C                          V Examination:                             normal. Prophylactic Antibiotics: The patient does                             not require prophylactic antibiotics. Prior                             Anticoagulants: The patient has taken no                             anticoagulant or antiplatelet agents. ASA Grade                             Assessment: II - A patient with mild systemic                             disease. After reviewing the risks and benefits,                             the patient was deemed in satisfactory condition to                             undergo the procedure. The anesthesia plan was to                             use moderate sedation / analgesia (conscious                             sedation). Immediately prior to administration of                             medications, the patient was re-assessed for                             adequacy to receive sedatives. The heart rate,                             respiratory rate, oxygen saturations, blood                                                       pressure, adequacy of pulmonary ventilation, and                             response to care were monitored throughout the                             procedure. The physical status of the patient was                             re-assessed after the procedure.                            After obtaining informed consent, the colonoscope                             was passed under direct vision. Throughout the                             procedure, the patient's blood pressure, pulse, and                             oxygen saturations were monitored continuously. The                             Olympus Adult Colonoscope, Model #  CF-UF121U,                             [facility] # [id number] was introduced through the                             anus and advanced to the cecum, identified by                             appendiceal orifice and ileocecal valve. The                             colonoscopy was performed without difficulty. The                                                       patient tolerated the procedure well. The quality                             of the bowel preparation was good. The ileocecal                             valve, appendiceal orifice, and rectum were                             photographed.                                                                                   Findings:       The perianal and digital rectal examinations were normal.       The entire examined colon appeared normal on direct and retroflexion        views.                                                                                   Impression:               - The entire examined colon is normal on direct and                             retroflexion views.                            - No specimens collected.  Recommendation:           - No repeat colonoscopy.                                                                                   Procedure Code(s):       --- Professional ---       , Colorectal cancer screening; colonoscopy on individual not                                  meeting criteria for high risk  Diagnosis Code(s):       --- Professional ---       Z12.11, Encounter for screening for malignant neoplasm of colon    CPT copyright 2022 [employer]. All rights reserved.    The codes documented in this report are preliminary and upon  review may   be revised to meet current compliance requirements.    Electronically signed by Gabriel Stoddard MD  ____________________  GABRIEL STODDARD MD  6/11/2024 12:52:04 PM  I was physically present for the entire viewing portion of the  exam.  __________________________GABRIEL SUAZO MD  Number of Addenda: 0    Note Initiated On: 6/11/2024 12:10 PM  MRN:                      [id number]  Procedure Date:           6/11/2024 12:10:57 PM  Scope Withdrawal Time: 0 hours 6 minutes 40 seconds   Total Procedure Duration: 0 hours 14 minutes 43 seconds   Estimated Blood Loss:       Scope In: 12:33:23 PM  Scope Out: 12:48:06 PM          Total time spent for face to face visit, reviewing labs/imaging studies, counseling and coordination of care was: 1 Hour spent on the date of the encounter doing chart review, review of outside records, review of test results, interpretation of tests, patient visit, and documentation     The longitudinal plan of care for the diagnosis(es)/condition(s) as documented were addressed during this visit. Due to the added complexity in care, I will continue to support Alix in the subsequent management and with ongoing continuity of care.    This note was dictated using voice recognition software.  Any grammatical or context distortions are unintentional and inherent to the software.    Orders Placed This Encounter   Procedures    Folate    Hemoglobin A1c    Anti Nuclear Zahida IgG by IFA with Reflex    Methylmalonic Acid    Vitamin B1 whole blood    Vitamin B12    Vitamin B6    Rheumatoid factor    Ferritin    EMG      New Prescriptions    No medications on file      Modified Medications    No medications on file                Again, thank you for allowing me to participate in the care of your patient.      Sincerely,    Sathish Kelly MD

## 2024-10-14 NOTE — NURSING NOTE
"Alix Escobar is a 79 year old female who presents for:  Chief Complaint   Patient presents with    Neurologic Problem     Tingling/hot/prickly/uncomfortable and both feet looks red.  Noticed it for a couple of months now.         Initial Vitals:  /67   Pulse 69   Wt 72.5 kg (159 lb 14.4 oz)   BMI 26.20 kg/m   Estimated body mass index is 26.2 kg/m  as calculated from the following:    Height as of 6/5/24: 1.664 m (5' 5.5\").    Weight as of this encounter: 72.5 kg (159 lb 14.4 oz).. Body surface area is 1.83 meters squared. BP completed using cuff size: wrist cuff    Jeet Noriega  "

## 2024-10-15 LAB — ANA SER QL IF: NEGATIVE

## 2024-10-16 LAB — METHYLMALONATE SERPL-SCNC: 0.22 UMOL/L (ref 0–0.4)

## 2024-10-17 LAB
PYRIDOXAL PHOS SERPL-SCNC: 270.3 NMOL/L
VIT B1 PYROPHOSHATE BLD-SCNC: 140 NMOL/L

## 2025-02-03 ENCOUNTER — OFFICE VISIT (OUTPATIENT)
Dept: NEUROLOGY | Facility: CLINIC | Age: 80
End: 2025-02-03
Attending: PSYCHIATRY & NEUROLOGY
Payer: MEDICARE

## 2025-02-03 VITALS
WEIGHT: 161 LBS | DIASTOLIC BLOOD PRESSURE: 81 MMHG | HEART RATE: 64 BPM | HEIGHT: 66 IN | BODY MASS INDEX: 25.88 KG/M2 | SYSTOLIC BLOOD PRESSURE: 135 MMHG

## 2025-02-03 DIAGNOSIS — R20.2 PARESTHESIA: ICD-10-CM

## 2025-02-03 DIAGNOSIS — R20.2 PARESTHESIA: Primary | ICD-10-CM

## 2025-02-03 PROCEDURE — 95910 NRV CNDJ TEST 7-8 STUDIES: CPT | Performed by: PSYCHIATRY & NEUROLOGY

## 2025-02-03 PROCEDURE — 99213 OFFICE O/P EST LOW 20 MIN: CPT | Performed by: PSYCHIATRY & NEUROLOGY

## 2025-02-03 PROCEDURE — 95886 MUSC TEST DONE W/N TEST COMP: CPT | Mod: RT | Performed by: PSYCHIATRY & NEUROLOGY

## 2025-02-03 NOTE — PROCEDURES
ELECTROMYOGRAPHY (EMG) REPORT       [facility] NEUROLOGY [place]  [address] [place] [address]  Tel: [phone]  Fax: [phone]  [email or web address]     Alix Escobar,  1945, MRN [id number]  PCP: [facility]  Date: 2/3/2025     Principal Diagnosis: Paresthesia     Height: 5 feet 5 inch  Reason for referral: Evaluate bilateral lowers. c/o burning sensations in both feet > 1 year. Left = Right.       Motor NCS      Nerve / Sites Lat Amp Dist [place]    ms mV cm m/s   L Peroneal - EDB      Ankle 4.64 3.2 8       Fib head 10.99 3.2 31 49      Pop fossa 13.39 3.0 11 46   R Peroneal - EDB      Ankle 4.38 2.3 8       Fib head 10.36 2.1 29 48      Pop fossa 12.60 2.2 11 49   L Tibial - AH      Ankle 5.57 11.6 8       Pop fossa 13.75 10.6 40 49   R Tibial - AH      Ankle 5.83 6.4 8       Pop fossa 14.38 5.7 37 43       F  Wave      Nerve Fmin    ms   L Tibial - AH 48.59   R Tibial - AH 48.23       Sensory NCS      Nerve / Sites Onset Lat Pk Lat Amp.2-3 Dist [place]    ms ms  V cm m/s   L Sural - Ankle (Calf)      Calf 3.33 4.11 9.6 14 42   R Sural - Ankle (Calf)      Calf 3.54 4.48 8.1 14 40   L Superficial peroneal - Ankle      Lat leg 2.60 3.49 5.6 12 46   R Superficial peroneal - Ankle      Lat leg 2.66 3.54 4.6 12 45       EMG Summary Table     Spontaneous MUAP Rcmt Note   Muscle Fib PSW Fasc IA # Amp Dur PPP Rate Type   L. Gluteus medius None None None N N N N N N N   L. Gluteus adriane None None None N N N N N N N   L. L3 paraspinal None None None N N N N N N N   L. L4 paraspinal None None None N N N N N N N   L. L5 paraspinal None None None N N N N N N N   L. S1 paraspinal None None None N N N N N N N   L. Adductor jolly None None None N N N N N N N   L. Quadriceps None None None N N N N N N N   L. Tibialis anterior None None None N N N N N N N   L. Gastrocnemius (Medial head) None None None N N N N N N N   L. Tibialis posterior None None None N N N N N  N N   R. Adductor jolly None None None N N N N N N N   R. Quadriceps None None None N N N N N N N   R. Tibialis anterior None None None N N N N N N N   R. Gastrocnemius (Medial head) None None None N N N N N N N   R. Tibialis posterior None None None N N N N N N N        Summary: Nerve conduction and EMG study of bilateral lower extremities shows:  Normal bilateral peroneal and tibial CMAP, distal motor latencies and conduction velocities.  Normal bilateral tibial F latencies  Normal bilateral sural and superficial peroneal SNAP  Needle exam was normal.    Impression:   This is a normal nerve conduction and EMG study of bilateral lower extremities.      Sathish Kelly MD  [facility]      This note was dictated using voice recognition software.  Any grammatical or context distortions are unintentional and inherent to the software.

## 2025-02-03 NOTE — PROGRESS NOTES
NEUROLOGY FOLLOW UP VISIT  NOTE       [facility] NEUROLOGY [place]  [address] [place] [address]  Tel: [phone]  Fax: [phone]  [email or web address]     Alix Escobar,  1945, MRN [id number]  PCP: [facility] [place]  Date: 2025      ASSESSMENT & PLAN     Visit Diagnosis  Paresthesia     Paresthesias; R/O small fiber neuropathy  79-year-old female with HLD, osteopenia, GERD, HTN who was initially evaluated for bilateral feet paresthesias.  Although her symptoms raise the possibility of peripheral neuropathy her EMG was within normal limits.  I have informed her that are normal EMG does not rule out the possibility of small fiber neuropathy but I suspect her symptoms were due to elevated B6 as she was taking supplements.  She has stopped using vitamin B6 supplement and also noted to have a low normal ferritin level.  At present I have recommended taking iron supplement for the next couple of months and no further intervention.  If she continues to have paresthesias we can consider gabapentin or nortriptyline for possible small fiber neuropathy.  Obviously we will need a skin biopsy to confirm that diagnosis.  Follow-up will be in as needed basis.    Thank you again for this referral, please feel free to contact me if you have any questions.    Sathish Kelly MD  [facility] NEUROLOGY, [place]     HISTORY OF PRESENT ILLNESS     Patient is a 79-year-old female with HLD, osteopenia, GERD, HTN last seen on 10/14/2024 for bilateral feet paresthesias and clinically had findings to suggest peripheral neuropathy.  She had lab work for common causes of neuropathy that included normal rheumatoid factor, B12, B1, MMA, antinuclear antibody, hemoglobin A1c and folate.  Vitamin B6 level was elevated at 270.3 and ferritin although normal was 53.  EMG was within normal limits.  Patient still reports some symptoms at night but is happy to learn that she  does not have neuropathy.  She reports that after she found out her vitamin B6 level was elevated she has stopped using vitamin supplements      According to patient her symptoms started about a year ago.  She started noticing restless in her leg especially when she would sit down or lay down at night.  She would put a warm cloth on her feet to get some relief.  During daytime as long as she is moving she did not have any symptoms.  She denies any weakness, autonomic symptoms or any change in skin color.  There is no history of diabetes or exposure to any chemical or chemotherapeutic agent.  She also denies alcohol use.     PROBLEM LIST   Patient Active Problem List   Diagnosis    Hyperlipidemia LDL goal <160    Cervicalgia    Osteopenia    Gastroesophageal reflux disease, esophagitis presence not specified    Onychomycosis    Radial scar of left breast    Benign essential hypertension    Bilateral sensorineural hearing loss         PAST MEDICAL & SURGICAL HISTORY     Past Medical History:   Patient  has a past medical history of Advanced directives, counseling/discussion (06/29/2011), Cervicalgia (08/08/2011), Dysphagia, Hyperlipidemia LDL goal <130 (04/15/2011), Hyperlipidemia LDL goal <160 (04/15/2011), Hypertension, Osteoarthritides, Osteopenia (09/20/2011), Routine general medical examination at a health care facility (09/20/2011), Routine gynecological examination (09/20/2011), and Umbilical hernia.    Surgical History:  She  has a past surgical history that includes Excise mass upper extremity (5/20/2013); Colonoscopy (10/1/2013); wisdom teeth; biopsy; Breast surgery (1985); Eye surgery; Biopsy Breast Seed Localization (Left, 1/7/2021); and Colonoscopy (N/A, 6/11/2024).     SOCIAL HISTORY     Reviewed, and she  reports that she has never smoked. She has never used smokeless tobacco. She reports that she does not drink alcohol and does not use drugs.     FAMILY HISTORY     Reviewed, and family history includes  "Arthritis in an other family member; Blood Disease in her sister; C.A.D. in her mother; Cerebrovascular Disease in her father; Coronary Artery Disease in her mother; Hypertension in her maternal grandfather and paternal grandfather; Neurologic Disorder in her sister; Unknown/Adopted in her maternal grandmother.     ALLERGIES     Allergies   Allergen Reactions    Seasonal Allergies          REVIEW OF SYSTEMS     A 12 point review of system was performed and was negative except as outlined in the history of present illness.     HOME MEDICATIONS     Current Outpatient Rx   Medication Sig Dispense Refill    alendronate (FOSAMAX) 70 MG tablet Take 1 tablet (70 mg) by mouth every 7 days. 12 tablet 3    calcium citrate-vitamin D (CITRACAL) 315-200 MG-UNIT TABS Take 1 tablet by mouth daily      capsaicin (ZOSTRIX) 0.025 % external cream 1 gram to affected areas on feet 2-3 times daily as needed for burning pain. 45 g 3    Capsicum, Cayenne, (CAYENNE PO) Take 2 tablets by mouth daily      co-enzyme Q-10 50 MG CAPS Take 1 tablet by mouth daily      losartan (COZAAR) 25 MG tablet Take 1 tablet (25 mg) by mouth daily 90 tablet 3    multivitamin w/minerals (THERA-VIT-M) tablet Take 1 tablet by mouth daily      naproxen sodium (ANAPROX) 220 MG tablet Take 220 mg by mouth 2 times daily (with meals)           PHYSICAL EXAM     Vital signs  /81 (BP Location: Right arm, Patient Position: Sitting)   Pulse 64   Ht 1.664 m (5' 5.5\")   Wt 73 kg (161 lb)   BMI 26.38 kg/m      Weight:   161 lbs 0 oz    Elderly female who is alert and oriented vital signs are reviewed and documented in electronic medical record. Neck supple. Neurologically speech was normal. Cranial nerves II through XII are intact. Motor strength 5/5. Reflexes 2+ except ankles 1+ sensation decreased to temperature and vibratory sensation. Pinprick sensation was normal. No dysmetria noted on finger-nose testing gait normal Romberg negative      PERTINENT " DIAGNOSTIC STUDIES     Following studies were reviewed:     EMG 2/3/2025  This is a normal nerve conduction and EMG study of bilateral lower extremities.     PERTINENT LABS  Following labs were reviewed:  No visits with results within 3 Month(s) from this visit.   Latest known visit with results is:   Lab on 10/14/2024   Component Date Value Ref Range Status    Folic Acid 10/14/2024 27.2  4.6 - 34.8 ng/mL Final    Estimated Average Glucose 10/14/2024 114  <117 mg/dL Final    Hemoglobin A1C 10/14/2024 5.6  <5.7 % Final    GUSTAVO interpretation 10/14/2024 Negative  Negative Final    Methylmalonic Acid 10/14/2024 0.22  0.00 - 0.40 umol/L Final    Vitamin B1 Whole Blood Level 10/14/2024 140  70 - 180 nmol/L Final    Vitamin B12 10/14/2024 577  232 - 1,245 pg/mL Final    Vitamin B6 10/14/2024 270.3 (H)  20.0 - 125.0 nmol/L Final    Rheumatoid Factor 10/14/2024 <10  <14 IU/mL Final    Ferritin 10/14/2024 53  11 - 328 ng/mL Final         Total time spent for face to face visit, reviewing labs/imaging studies, counseling and coordination of care was: 20 Minutes spent on the date of the encounter doing chart review, review of outside records, review of test results, interpretation of tests, patient visit, and documentation     The longitudinal plan of care for the diagnosis(es)/condition(s) as documented were addressed during this visit. Due to the added complexity in care, I will continue to support Alix in the subsequent management and with ongoing continuity of care.    This note was dictated using voice recognition software.  Any grammatical or context distortions are unintentional and inherent to the software.    No orders of the defined types were placed in this encounter.     New Prescriptions    No medications on file     Modified Medications    No medications on file

## 2025-02-03 NOTE — LETTER
2/3/2025      Alix Escobar  [address] [address]      Dear Colleague,    Thank you for referring your patient, Alix Escobar, to the [facility] [place]. Please see a copy of my visit note below.    See procedure note for EMG report      Again, thank you for allowing me to participate in the care of your patient.        Sincerely,        Sathish Kelly MD    Electronically signed

## 2025-03-26 ENCOUNTER — PATIENT OUTREACH (OUTPATIENT)
Dept: CARE COORDINATION | Facility: CLINIC | Age: 80
End: 2025-03-26
Payer: MEDICARE

## 2025-04-21 ENCOUNTER — PATIENT OUTREACH (OUTPATIENT)
Dept: CARE COORDINATION | Facility: CLINIC | Age: 80
End: 2025-04-21
Payer: MEDICARE

## 2025-04-23 ENCOUNTER — PATIENT OUTREACH (OUTPATIENT)
Dept: CARE COORDINATION | Facility: CLINIC | Age: 80
End: 2025-04-23
Payer: MEDICARE

## 2025-04-29 ENCOUNTER — HOSPITAL ENCOUNTER (OUTPATIENT)
Dept: MAMMOGRAPHY | Facility: CLINIC | Age: 80
Discharge: HOME | End: 2025-04-29
Attending: FAMILY MEDICINE | Admitting: FAMILY MEDICINE
Payer: MEDICARE

## 2025-04-29 DIAGNOSIS — Z12.31 VISIT FOR SCREENING MAMMOGRAM: ICD-10-CM

## 2025-04-29 PROCEDURE — 77063 BREAST TOMOSYNTHESIS BI: CPT

## 2025-05-20 ENCOUNTER — TELEPHONE (OUTPATIENT)
Dept: FAMILY MEDICINE | Facility: CLINIC | Age: 80
End: 2025-05-20
Payer: MEDICARE

## 2025-05-20 NOTE — TELEPHONE ENCOUNTER
Please reach out to patient to schedule an appointment with provider     RN chart review   - no provider visit within 3 months indicating need for ear wash   - no order for ear wash     Unable to complete MA only ear wash without visit and order     Saritha Guzman Registered Nurse  [facility]

## 2025-05-20 NOTE — TELEPHONE ENCOUNTER
Reason for Call:  Appointment Request    Patient requesting this type of appt:  Ear Wash appt    Requested provider: Ma/ Emilianon/RN Visit    Reason patient unable to be scheduled: Pt does not have order from PCP    When does patient want to be seen/preferred time: Pt did not state a pref date for appt time    Comments: Pt would like tosched a ear cleaning    Could we send this information to you in [facility] or would you prefer to receive a phone call?:   Patient would prefer a phone call   Okay to leave a detailed message?: Yes at Cell number on file:    Telephone Information:   [phone]        Call taken on 5/20/2025 at 12:46 PM by Paris Lainez

## 2025-05-20 NOTE — TELEPHONE ENCOUNTER
Contacts       Contact Date/Time Type Contact Phone/Fax    05/20/2025 12:46 PM CDT Phone (Incoming) Alix Escobar (Self) [phone] (H)          Attempted to reach patient to: Schedule an appointment    When patient returns call, please take this action: Assist with scheduling    Reason for the visit: Will need a visit with a provider for ear wash     When to schedule: Next available    Additional comments/info: Per Nurse, patient will need an appt with provider for ear wash for orders    If unable to schedule: Add a note to the telephone encounter noting the barrier and route back to primary care team MAXI Humphreys

## 2025-05-29 DIAGNOSIS — B35.3 TINEA PEDIS OF BOTH FEET: Primary | ICD-10-CM

## 2025-05-29 RX ORDER — CLOTRIMAZOLE AND BETAMETHASONE DIPROPIONATE 10; .64 MG/G; MG/G
CREAM TOPICAL 2 TIMES DAILY PRN
Qty: 45 G | Refills: 3 | Status: SHIPPED | OUTPATIENT
Start: 2025-05-29

## 2025-05-29 NOTE — PROGRESS NOTES
Past Medical History:   Diagnosis Date    Advanced directives, counseling/discussion 06/29/2011    Cervicalgia 08/08/2011    Dysphagia     Hyperlipidemia LDL goal <130 04/15/2011    Hyperlipidemia LDL goal <160 04/15/2011    High HDL    Hypertension     Osteoarthritides     Osteopenia 09/20/2011    Routine general medical examination at a health care facility 09/20/2011    Routine gynecological examination 09/20/2011    Umbilical hernia      Patient Active Problem List   Diagnosis    Hyperlipidemia LDL goal <160    Cervicalgia    Osteopenia    Gastroesophageal reflux disease, esophagitis presence not specified    Onychomycosis    Radial scar of left breast    Benign essential hypertension    Bilateral sensorineural hearing loss     Past Surgical History:   Procedure Laterality Date    BIOPSY      benign breast, right breast.2003    BIOPSY BREAST SEED LOCALIZATION Left 1/7/2021    Procedure: LEFT BREAST SEED LOCALIZED EXCISIONAL BIOPSY;  Surgeon: Jam Wright MD;  Location: [facility] OR    BREAST SURGERY  1985    benign lump removed. Left breast.1985    COLONOSCOPY  10/1/2013    Procedure: COLONOSCOPY;  Colonoscopy ;  Surgeon: Adair Stoddard MD;  Location:  GI    COLONOSCOPY N/A 6/11/2024    Procedure: Colonoscopy;  Surgeon: Adair Stoddard MD;  Location:  GI    EXCISE MASS UPPER EXTREMITY  5/20/2013    Procedure: EXCISE MASS UPPER EXTREMITY;  Remove Mass Right Upper Arm;  Surgeon: Elvira Wilson MD;  Location: [facility] OR    EYE SURGERY      lasik    wisdom teeth       Social History     Socioeconomic History    Marital status:      Spouse name: Not on file    Number of children: Not on file    Years of education: Not on file    Highest education level: Master's degree (e.g., MA, MS, April, MEd, MSW, MÓNICA)   Occupational History    Not on file   Tobacco Use    Smoking status: Never    Smokeless tobacco: Never   Vaping Use    Vaping status: Never Used   Substance and Sexual Activity    Alcohol use: No     Drug use: No    Sexual activity: Not Currently     Partners: Male     Birth control/protection: None   Other Topics Concern    Parent/sibling w/ CABG, MI or angioplasty before 65F 55M? No   Social History Narrative    Not on file     Social Drivers of Health     Financial Resource Strain: Low Risk  (5/31/2024)    Financial Resource Strain     Within the past 12 months, have you or your family members you live with been unable to get utilities (heat, electricity) when it was really needed?: No   Food Insecurity: Low Risk  (5/31/2024)    Food Insecurity     Within the past 12 months, did you worry that your food would run out before you got money to buy more?: No     Within the past 12 months, did the food you bought just not last and you didn t have money to get more?: No   Transportation Needs: Low Risk  (5/31/2024)    Transportation Needs     Within the past 12 months, has lack of transportation kept you from medical appointments, getting your medicines, non-medical meetings or appointments, work, or from getting things that you need?: No   Physical Activity: Insufficiently Active (5/31/2024)    Exercise Vital Sign     Days of Exercise per Week: 4 days     Minutes of Exercise per Session: 30 min   Stress: No Stress Concern Present (5/31/2024)    [employer] - Occupational Stress Questionnaire     Feeling of Stress : Not at all   Social Connections: Socially Integrated (5/31/2024)    Social Connection and Isolation Panel [NHANES]     Frequency of Communication with Friends and Family: Twice a week     Frequency of Social Gatherings with Friends and Family: Three times a week     Attends Yazdanism Services: More than 4 times per year     Active Member of Clubs or Organizations: Yes     Attends Club or Organization Meetings: More than 4 times per year     Marital Status:    Interpersonal Safety: Low Risk  (4/17/2025)    Interpersonal Safety     Do you feel physically and  emotionally safe where you currently live?: Yes     Within the past 12 months, have you been hit, slapped, kicked or otherwise physically hurt by someone?: No     Within the past 12 months, have you been humiliated or emotionally abused in other ways by your partner or ex-partner?: No   Housing Stability: Low Risk  (2024)    Housing Stability     Do you have housing? : Yes     Are you worried about losing your housing?: No     Family History   Problem Relation Age of Onset    C.A.D. Mother     Coronary Artery Disease Mother          at 103 years old, CHF    Cerebrovascular Disease Father             Unknown/Adopted Maternal Grandmother         I don't know what disease this refers to.  She  in childbirth.    Hypertension Maternal Grandfather     Hypertension Paternal Grandfather     Blood Disease Sister     Neurologic Disorder Sister         epilepsy    Arthritis Other         neck and thumb joints    Colon Cancer No family hx of          Patient requests refill of Lotrisone cream.  Refill sent to the pharmacy.

## 2025-07-11 ENCOUNTER — RESULTS FOLLOW-UP (OUTPATIENT)
Dept: FAMILY MEDICINE | Facility: CLINIC | Age: 80
End: 2025-07-11

## 2025-07-11 DIAGNOSIS — R73.03 PREDIABETES: Primary | ICD-10-CM

## 2025-07-11 PROBLEM — H69.93 EUSTACHIAN TUBE DYSFUNCTION, BILATERAL: Status: ACTIVE | Noted: 2025-07-11

## 2025-07-11 PROBLEM — J31.0 CHRONIC RHINITIS: Status: ACTIVE | Noted: 2025-07-11

## 2025-07-11 PROBLEM — H93.13 TINNITUS OF BOTH EARS: Status: ACTIVE | Noted: 2025-07-11

## 2025-07-12 ENCOUNTER — MYC MEDICAL ADVICE (OUTPATIENT)
Dept: FAMILY MEDICINE | Facility: CLINIC | Age: 80
End: 2025-07-12
Payer: MEDICARE

## 2025-07-14 RX ORDER — LANCETS
EACH MISCELLANEOUS
Qty: 100 EACH | Refills: 6 | Status: SHIPPED | OUTPATIENT
Start: 2025-07-14

## 2025-07-17 ENCOUNTER — LAB (OUTPATIENT)
Dept: LAB | Facility: CLINIC | Age: 80
End: 2025-07-17
Payer: MEDICARE

## 2025-07-17 DIAGNOSIS — E78.5 HYPERLIPIDEMIA LDL GOAL <130: ICD-10-CM

## 2025-07-17 LAB
CHOLEST SERPL-MCNC: 252 MG/DL
FASTING STATUS PATIENT QL REPORTED: YES
HDLC SERPL-MCNC: 69 MG/DL
LDLC SERPL CALC-MCNC: 165 MG/DL
NONHDLC SERPL-MCNC: 183 MG/DL
TRIGL SERPL-MCNC: 91 MG/DL

## 2025-07-20 ENCOUNTER — MYC MEDICAL ADVICE (OUTPATIENT)
Dept: PODIATRY | Facility: CLINIC | Age: 80
End: 2025-07-20
Payer: MEDICARE

## 2025-07-21 ENCOUNTER — MYC MEDICAL ADVICE (OUTPATIENT)
Dept: NEUROLOGY | Facility: CLINIC | Age: 80
End: 2025-07-21
Payer: MEDICARE

## 2025-07-21 DIAGNOSIS — R20.2 PARESTHESIA: ICD-10-CM

## 2025-07-21 RX ORDER — NORTRIPTYLINE HYDROCHLORIDE 50 MG/1
50 CAPSULE ORAL AT BEDTIME
Qty: 90 CAPSULE | Refills: 2 | Status: SHIPPED | OUTPATIENT
Start: 2025-07-21

## 2025-07-21 NOTE — TELEPHONE ENCOUNTER
Patient increased nortriptyline to 50 mg nightly on 7/7, reports improvement in symptoms.     Updated Rx pended for review/signature.     Laura SHAH RN, BSN  [facility] Neurology

## 2025-09-03 DIAGNOSIS — B35.3 TINEA PEDIS OF BOTH FEET: ICD-10-CM

## 2025-09-03 RX ORDER — CLOTRIMAZOLE AND BETAMETHASONE DIPROPIONATE 10; .64 MG/G; MG/G
CREAM TOPICAL 2 TIMES DAILY PRN
Qty: 45 G | Refills: 3 | Status: SHIPPED | OUTPATIENT
Start: 2025-09-03

## (undated) DEVICE — KIT ENDO TURNOVER/PROCEDURE W/CLEAN A SCOPE LINERS 103888

## (undated) DEVICE — BAG CLEAR TRASH 1.3M 39X33" P4040C

## (undated) DEVICE — SLEEVE PROTECTIVE BREAST BIOPSY  GMSLV001-10

## (undated) DEVICE — SU VICRYL 3-0 TIE 12X18" J904T

## (undated) DEVICE — DRSG STERI STRIP 1/2X4" R1547

## (undated) DEVICE — SU VICRYL 3-0 SH 27" J316H

## (undated) DEVICE — NDL BLUNT 18GA 1.5" W/O FILTER 305180

## (undated) DEVICE — SU VICRYL 4-0 PS-2 18" UND J496H

## (undated) DEVICE — SUCTION MANIFOLD NEPTUNE 2 SYS 4 PORT 0702-020-000

## (undated) DEVICE — GLOVE PROTEXIS BLUE W/NEU-THERA 7.5  2D73EB75

## (undated) DEVICE — SYR 30ML LL W/O NDL

## (undated) DEVICE — LINEN TOWEL PACK X10 5473

## (undated) DEVICE — TUBING SUCTION 12"X1/4" N612

## (undated) DEVICE — GLOVE PROTEXIS BLUE W/NEU-THERA 8.0  2D73EB80

## (undated) DEVICE — DRAPE LAP W/ARMBOARD 29410

## (undated) DEVICE — PREP SCRUB SOL EXIDINE 4% CHG 4OZ 29002-404

## (undated) DEVICE — CLIP ETHICON LIGACLIP MED WHITE LT200

## (undated) DEVICE — SYR EAR BULB 3OZ 0035830

## (undated) DEVICE — LINEN FULL SHEET 5511

## (undated) DEVICE — GLOVE PROTEXIS W/NEU-THERA 7.0  2D73TE70

## (undated) DEVICE — SUCTION TIP YANKAUER W/O VENT K86

## (undated) DEVICE — NDL BLUNT 18GA 1" W/O FILTER 305181

## (undated) DEVICE — LINEN HALF SHEET 5512

## (undated) DEVICE — PACK MINOR CUSTOM RIDGES SBA32RMRMA

## (undated) DEVICE — CLIP APPLIER 09 3/8" SM LIGACLIP MCS20

## (undated) DEVICE — DRSG TEGADERM 4X4 3/4" 1626W

## (undated) DEVICE — GLOVE PROTEXIS W/NEU-THERA 7.5  2D73TE75

## (undated) DEVICE — PREP SKIN SCRUB TRAY 4461A

## (undated) DEVICE — PAD CHUX UNDERPAD 30X36" P3036C

## (undated) RX ORDER — DEXAMETHASONE SODIUM PHOSPHATE 4 MG/ML
INJECTION, SOLUTION INTRA-ARTICULAR; INTRALESIONAL; INTRAMUSCULAR; INTRAVENOUS; SOFT TISSUE
Status: DISPENSED
Start: 2021-01-07

## (undated) RX ORDER — LIDOCAINE HYDROCHLORIDE 10 MG/ML
INJECTION, SOLUTION EPIDURAL; INFILTRATION; INTRACAUDAL; PERINEURAL
Status: DISPENSED
Start: 2021-01-07

## (undated) RX ORDER — FENTANYL CITRATE 50 UG/ML
INJECTION, SOLUTION INTRAMUSCULAR; INTRAVENOUS
Status: DISPENSED
Start: 2021-01-07

## (undated) RX ORDER — BUPIVACAINE HYDROCHLORIDE AND EPINEPHRINE 2.5; 5 MG/ML; UG/ML
INJECTION, SOLUTION EPIDURAL; INFILTRATION; INTRACAUDAL; PERINEURAL
Status: DISPENSED
Start: 2021-01-07

## (undated) RX ORDER — GLYCOPYRROLATE 0.2 MG/ML
INJECTION INTRAMUSCULAR; INTRAVENOUS
Status: DISPENSED
Start: 2021-01-07

## (undated) RX ORDER — PROPOFOL 10 MG/ML
INJECTION, EMULSION INTRAVENOUS
Status: DISPENSED
Start: 2021-01-07

## (undated) RX ORDER — ONDANSETRON 2 MG/ML
INJECTION INTRAMUSCULAR; INTRAVENOUS
Status: DISPENSED
Start: 2021-01-07

## (undated) RX ORDER — ACETAMINOPHEN 325 MG/1
TABLET ORAL
Status: DISPENSED
Start: 2021-01-07

## (undated) RX ORDER — CEFAZOLIN SODIUM 2 G/100ML
INJECTION, SOLUTION INTRAVENOUS
Status: DISPENSED
Start: 2021-01-07

## (undated) RX ORDER — FENTANYL CITRATE 50 UG/ML
INJECTION, SOLUTION INTRAMUSCULAR; INTRAVENOUS
Status: DISPENSED
Start: 2024-06-11

## (undated) RX ORDER — EPHEDRINE SULFATE 50 MG/ML
INJECTION, SOLUTION INTRAMUSCULAR; INTRAVENOUS; SUBCUTANEOUS
Status: DISPENSED
Start: 2021-01-07